# Patient Record
Sex: FEMALE | Race: WHITE | NOT HISPANIC OR LATINO | Employment: FULL TIME
[De-identification: names, ages, dates, MRNs, and addresses within clinical notes are randomized per-mention and may not be internally consistent; named-entity substitution may affect disease eponyms.]

---

## 2023-05-21 ENCOUNTER — HEALTH MAINTENANCE LETTER (OUTPATIENT)
Age: 43
End: 2023-05-21

## 2023-05-22 NOTE — TELEPHONE ENCOUNTER
REFERRAL INFORMATION:    Referring Provider:  N/A    Referring Clinic:  N/A    Reason for Visit/Diagnosis: Re-establish care(WM)       FUTURE VISIT INFORMATION:    Appointment Date: 06-02-23    Appointment Time: @ 9am     NOTES RECORD STATUS  DETAILS   OFFICE NOTE from Referring Provider In process [facility] 2017   OFFICE NOTE from Other Specialists Care Everywhere [facility]:  11/18/22 - ENDO OV with Bryan Moritz, PA   HOSPITAL DISCHARGE SUMMARY/ ED VISITS  N/A    OPERATIVE REPORT N/A 2017 Bypass   ENDOSCOPY (EGD)  N/A    PERTINENT LABS N/A    PATHOLOGY REPORTS (RELATED) N/A    IMAGING (CT, MRI, US, XR)  N/A      05-22-23 sent request to [facility](# [phone] @ 1:00pm  * 5/26/23 12:04 PM Faxed req to [facility] (Fax: [phone]) for records to be urgently faxed to the clinic. - Zina

## 2023-06-02 ENCOUNTER — DOCUMENTATION ONLY (OUTPATIENT)
Dept: ENDOCRINOLOGY | Facility: CLINIC | Age: 43
End: 2023-06-02

## 2023-06-02 ENCOUNTER — VIRTUAL VISIT (OUTPATIENT)
Dept: ENDOCRINOLOGY | Facility: CLINIC | Age: 43
End: 2023-06-02
Payer: COMMERCIAL

## 2023-06-02 ENCOUNTER — PRE VISIT (OUTPATIENT)
Dept: ENDOCRINOLOGY | Facility: CLINIC | Age: 43
End: 2023-06-02

## 2023-06-02 VITALS — WEIGHT: 140 LBS | BODY MASS INDEX: 20.73 KG/M2 | HEIGHT: 69 IN

## 2023-06-02 DIAGNOSIS — E44.1 MILD PROTEIN-CALORIE MALNUTRITION (H): ICD-10-CM

## 2023-06-02 DIAGNOSIS — R10.13 EPIGASTRIC PAIN: ICD-10-CM

## 2023-06-02 DIAGNOSIS — K90.9 INTESTINAL MALABSORPTION, UNSPECIFIED TYPE: ICD-10-CM

## 2023-06-02 DIAGNOSIS — Z87.11 HX OF GASTRIC ULCER: ICD-10-CM

## 2023-06-02 DIAGNOSIS — Z98.84 S/P BARIATRIC SURGERY: Primary | ICD-10-CM

## 2023-06-02 DIAGNOSIS — E56.9 VITAMIN DEFICIENCY: ICD-10-CM

## 2023-06-02 DIAGNOSIS — K21.00 GASTROESOPHAGEAL REFLUX DISEASE WITH ESOPHAGITIS, UNSPECIFIED WHETHER HEMORRHAGE: ICD-10-CM

## 2023-06-02 PROBLEM — K95.89 IRON DEFICIENCY ANEMIA FOLLOWING BARIATRIC SURGERY: Status: ACTIVE | Noted: 2023-05-24

## 2023-06-02 PROBLEM — D50.8 IRON DEFICIENCY ANEMIA FOLLOWING BARIATRIC SURGERY: Status: ACTIVE | Noted: 2023-05-24

## 2023-06-02 PROBLEM — G47.00 INSOMNIA: Status: ACTIVE | Noted: 2021-05-27

## 2023-06-02 PROBLEM — E21.3 HYPERPARATHYROIDISM, UNSPECIFIED (H): Status: ACTIVE | Noted: 2021-05-27

## 2023-06-02 PROBLEM — Z87.19 HX SBO: Status: ACTIVE | Noted: 2020-09-30

## 2023-06-02 PROCEDURE — 99205 OFFICE O/P NEW HI 60 MIN: CPT | Mod: VID | Performed by: NURSE PRACTITIONER

## 2023-06-02 RX ORDER — OMEPRAZOLE 40 MG/1
40 CAPSULE, DELAYED RELEASE ORAL DAILY
Qty: 90 CAPSULE | Refills: 3 | Status: SHIPPED | OUTPATIENT
Start: 2023-06-02 | End: 2023-11-21

## 2023-06-02 RX ORDER — SUCRALFATE ORAL 1 G/10ML
1 SUSPENSION ORAL 4 TIMES DAILY PRN
Qty: 414 ML | Refills: 3 | Status: SHIPPED | OUTPATIENT
Start: 2023-06-02 | End: 2023-11-21

## 2023-06-02 RX ORDER — CYANOCOBALAMIN 1000 UG/ML
1000 INJECTION, SOLUTION INTRAMUSCULAR; SUBCUTANEOUS
COMMUNITY
Start: 2022-03-14

## 2023-06-02 RX ORDER — FERROUS SULFATE 324(65)MG
TABLET, DELAYED RELEASE (ENTERIC COATED) ORAL
COMMUNITY
Start: 2022-02-10

## 2023-06-02 RX ORDER — B-COMPLEX WITH VITAMIN C
1 TABLET ORAL 3 TIMES DAILY
COMMUNITY
Start: 2022-08-01

## 2023-06-02 RX ORDER — AMOXICILLIN 250 MG
5000 CAPSULE ORAL EVERY MORNING
Status: ON HOLD | COMMUNITY
Start: 2022-03-22 | End: 2023-10-24

## 2023-06-02 ASSESSMENT — PAIN SCALES - GENERAL: PAINLEVEL: MODERATE PAIN (4)

## 2023-06-02 NOTE — PATIENT INSTRUCTIONS
"Thank you for allowing us the privilege of caring for you. We hope we provided you with the excellent service you deserve.   Please let us know if there is anything else we can do for you so that we can be sure you are completely satisfied with your care experience.    To ensure the quality of our services you may be receiving a patient satisfaction survey from an independent patient satisfaction monitoring company.    The greatest compliment you can give is a \"Likely to Recommend\"    Your visit was with Britta Mcgee NP today.    Instructions per today's visit:   -carafate at night on empty stomach- up to 4 times a day - needs to be  by 2 hours with any vitamins   -omeprazole daily on empty stomach- open and mix with teaspoon of applesauce  -EGD with Dr. Roth- [facility] should call  --if you dont hear from any one, Call [facility] at [phone] for scheduling.   -labs- we will fax to [facility]- let me know when you do them as I don't get notified when you go to other facilities -- we'll adjust vitamins after this.   -continue with iron infusion   -we will [facility] to get [facility]   -take creon 3 capfuls with first bite of food with each meal   -look for ways to increase protein- greek yogurt, cottage cheese, protein shakes, eggs, tofu are easier to tolerate proteins - you could try mixing protein into your coffee?   -increase hydration- add liquid IV or gatorade zero once daily to help with hydration   -see Sarah the dietitian next week  -follow up with me in 2-3 months  -You'll see Dr. Roth in clinic after we have op reports and EGD done       _________________________________________________________________________________________________________________________________________________________  Important contact and scheduling information:  Please call our contact center at [phone] to schedule your next appointments.  To find a lab location near you, please call [phone].  For " any nursing questions or concerns call Delma Ellis LPN at [phone] or Zina Montes RN at [phone]  Please call during clinic hours Monday through Friday 8:00a - 4:00p if you have questions or you can contact us via [employer] at anytime and we will reply during clinic hours.    Lab results will be communicated through My Chart or letter (if My Chart not used). Please call the clinic if you have not received communication after 1 week or if you have any questions.?  Clinic Fax: [phone]  _________________________________________________________________________________________________________________________________________________________  If you are asked by your clinic team to have your blood pressure checked:  [facility] do offer several locations for blood pressure checks. Please follow the below link to schedule an appointment. Scheduling an appointment at the pharmacy for a blood pressure check is now preferred.    Appointment Plus (appointment-plus.THE FASHION)  _________________________________________________________________________________________________________________________________________________________  Meal Replacement Products:    Here is the link to our new e-store where you can purchase our meal replacement products    [facility] E-Store  [employer]/store    The one week starter kit is a great way to sample a variety of products and see what works for you.    If you want more information about the product go to: Fresh [email or web address]    If you are an employee or [facility] or [facility] please contact your care team for a 10% estore discount    Free Shipping for orders over $75     Benefits of meal replacements products:    Portion and calorie control  Improved nutrition  Structured eating  Simplified food choices  Avoid contact with trigger foods  ______________  COMPREHENSIVE WEIGHT MANAGEMENT PROGRAM  VIRTUAL SUPPORT  "GROUPS    For Support Group Information:      We offer support groups for patients who are working on weight loss and considering, preparing for or have had weight loss surgery.   There is no cost for this opportunity.  You are invited to attend the?Virtual Support Groups?provided by any of the following locations:    [facility] via Microsoft Teams with Wendy Adkins RN  2.   [place] via [employer] with Uvaldo Solorio, PhD, LP  3.   [place] via [employer] with Lana Nieves RN  4.   [facility] via [employer] Teams with Lana Rogers [facility]    The following Support Group information can also be found on our website:  [email or web address]    [facility] Weight Loss Surgery Support Group    [facility] Weight Loss Surgery Support Group  The support group is a patient-lead forum that meets monthly to share experiences, encouragement and education. It is open to those who have had weight loss surgery, are scheduled for surgery, and those who are considering surgery.   WHEN: This group meets on the 3rd Wednesday of each month from 5:00PM - 6:00PM virtually using Microsoft Teams.   FACILITATOR: Led by Wendy Adkins, LAURA, JEWELL, RN, the program's Clinical Coordinator.   TO REGISTER: Please contact the clinic via [employer] or call the nurse line directly at [phone] to inform our staff that you would like an invite sent to you and to let us know the email you would like the invite sent to. Prior to the meeting, a link with directions on how to join the meeting will be sent to you.    2022 Meetings  Lyndsey 15: \"Let's Talk\" a time for the group to share.  July 20: \"Let's Talk\" a time for the group to share.  August 17: \"Let's Talk\" a time for the group to share.  September 21: \"Let's Talk\" a time for the group to share.  October 19: Guest Speaker: Dr Mauricio Santillan MD Pulmonologist and Sleep Medicine Physician, \"Getting a Good Night's " "Sleep\".  November 16: \"Let's Talk\" a time for the group to share.  December 21: \"Let's Talk\" a time for the group to share.    [facility] Support Groups    Connections: Bariatric Care Support Group?  This is open to all [facility] (and those external to this program) pre- and post- operative bariatric surgery patients as well as their support system.   WHEN: This group meets the 2nd Tuesday of each month from 6:30 PM - 8:00 PM virtually using Microsoft Teams.   FACILITATOR: Led by Uvaldo Solorio, Ph.D who is a Licensed Psychologist with the [facility] Comprehensive Weight Management Program.   TO REGISTER: Please send an email to Uvaldo Solorio, Ph.D.,  at?[email or web address]?if you would like an invitation to the group and to learn about using Microsoft Teams.    2022 Meetings  June 14: Barbara Johnson, LAURA, LD at [facility], \"Nutritional Labeling\"  July 12 August 2 (Please Note Date Change)  September 13 October 11 November 8 December 13    Connections: Post-Operative Bariatric Surgery Support Group  This is a support group for [facility] bariatric patients (and those external to [facility]) who have had bariatric surgery and are at least 3 months post-surgery.  WHEN: This support group meets the 4th Wednesday of the month from 11:00 AM - 12:00 PM virtually using Microsoft Teams.   FACILITATOR: Led by Certified Bariatric Nurse, Lana Nieves RN.   TO REGISTER: Please send an email to Lana at [email or web address] if you would like an invitation to the group and to learn about using Microsoft Teams.    2022 Meetings June 22 July 27 August 24 September 28 October 26 November 23 December 28  _____________________________________________________________________________________________________________________________________________  [place] [facility] Get Moving Program  Our team of physical therapists is " trained to help you understand and take control of your condition. They will perform a thorough evaluation to determine your ability for activity and develop a customized plan to fit your goals and physical ability.  Scheduling: Unsure if the Get Moving program is right for you? Discuss the program with your medical provider or diabetes educator. You can also call us at [phone] to ask questions or schedule an appointment.   [facility] Get Moving Program  ___________________________________________________________________________________________________________________________________________    To work with a Behavioral Health Psychologist:    Call to schedule:    Sal Cobian - [phone]  Maryjo Carpio - [phone]  Angeline Larson - [phone]  Saba Ray - [phone]   Linsey Muñiz PhD (cannot accept Medicare) [phone]        Thank you,   [facility] Comprehensive Weight Management Team

## 2023-06-02 NOTE — LETTER
"2023       RE: Juanis Sparks  [address]  [facility] [address]     Dear Colleague,    Thank you for referring your patient, Juanis Sparks, to the [facility]. Please see a copy of my visit note below.    Verbal consent to sign for HP access via [facility] Bariatric Surgery Consultation Note    2023    RE: Juanis Sparks  MR#: [id number]  : 1980      Referring provider:       2023     9:00 AM   --   Who referred you [facility]       Chief Complaint/Reason for visit: evaluation for possible weight loss surgery    Dear System, Provider Not In (General),    I had the pleasure of seeing your patient, Juanis Sparks, to evaluate her obesity and consider her for possible weight loss surgery.  As you know, Juanis Sparks is 42 year old.  She has a height of 5' 9\", a weight of 140 lbs 0 oz, and calculated Body mass index is 20.67 kg/m ..  Full intake/assessment was done to determine barriers to weight loss success and develop a treatment plan.    Assessment & Plan   Problem List Items Addressed This Visit        Nervous and Auditory    Epigastric pain    Relevant Medications    sucralfate (CARAFATE) 1 GM/10ML suspension    Other Relevant Orders    Adult GI  Referral - Procedure Only       Digestive    Intestinal malabsorption, unspecified type    Relevant Medications    lipase-protease-amylase (CREON) 21429-15657-496577 units CPEP per EC capsule    Other Relevant Orders    CBC with platelets    Comprehensive metabolic panel    Copper level    Ferritin    Parathyroid Hormone Intact    Vitamin A    Vitamin B12    Vitamin D Deficiency    Vitamin E    Vitamin K    Zinc    Vitamin deficiency    Relevant Medications    lipase-protease-amylase (CREON) [id number] units CPEP per EC capsule    Other Relevant Orders    CBC with platelets    Comprehensive metabolic panel    Copper " "level    Ferritin    Parathyroid Hormone Intact    Vitamin A    Vitamin B12    Vitamin D Deficiency    Vitamin E    Vitamin K    Zinc    Gastroesophageal reflux disease with esophagitis, unspecified whether hemorrhage    Relevant Medications    omeprazole (PRILOSEC) 40 MG DR capsule    lipase-protease-amylase (CREON) 24722-89187-138597 units CPEP per EC capsule    sucralfate (CARAFATE) 1 GM/10ML suspension    Other Relevant Orders    Adult GI  Referral - Procedure Only    Mild protein-calorie malnutrition (H)    Relevant Medications    lipase-protease-amylase (CREON) [id number] units CPEP per EC capsule       Other    S/P bariatric surgery - Primary     Patient underwent sleeve gastrectomy in 2017 at [facility] in [place]. 1 month postop she ended up having emergency surgery which required removal of 12-14 inches of intestine. She was told she had a RNYGB at that time. Operative reports not available today. She was working 3 jobs at the time and doesn't remember how her weight loss was then. Feels she has always had issues with eating and loose stools. She has had subsequent abdominal hernia repar with mesh and had to have mesh repaired sometime after that.     Historically, she has had \"several\" gastric and intestinal ulcers which were known at the time of her bariatric surgery. She was told she had a lot of inflammation and needed cholecystectomy as well. Since surgery she has been having a number of issues which bring her to clinic today, all of which she states she has been seen for at [facility] previously.     Describes epigastric pain, vomiting, reflux, regurgitation and wakes up choking often. Dysphagia with some meat and pills. She takes tums only for this as she has felt  Nothing else has been beneficial in the past. Pain and reflux prevent her from wanting to eat, other days she has anorexia. When she can eat, she describes 8-10 greasy loose bowel movements daily. She " has been working to manage her vitamin deficiency, malnutrition and malabsorption for several years and has become frustrated. She has an iron infusion ordered but hasn't scheduled yet. She does note hematemesis, coffee ground emesis, ( both intermittent), hematochezia and dark stools (both intermittent). Last EGD was several years ago at [facility]. Not taking PPI.     No hx tobacco use. No alcohol use. No NSAIDS. No carbonation. +caffeine 1-2 cups coffee daily. She does not drink much other than coffee during the day.     Discussed with patient her malabsorption and malnutrition and plans for management. She has questions about banana bag and IV fluids for dehydration. We discussed oral nutrition as our primary plan. If we can not optimize oral nutrition adequate, we'd need to consider Gtube management before TPN. We discussed IV hydration as a form of rescue hydration rather than maintenance hydration. Patient describes forgetting to eat and drink because she is busy even when she is feeling well. Stressed importance of increasing her nutrition as much of her dizziness/ malaise are secondary to her having inadequate oral intake. Stressed protein and hydration as first things to work on. Will see RD. Full bariatric panel has not been recently done, will update. Question if patient has absorption more like DS than RNYGB and needs water miscible vitamins.     In order to optimize nutrition we need to address symptom management. PPI will be essential life long given history of gastric ulcers. Discussed use of carafate for pain management especially with bothersome over night pain. Will trial creon to improve absorption and potentially improve loose stools. I will have Dr. Roth do EGD to evaluate for active ulcers, PPI would be first line treatment for this.     Plan to get operative reports for Dr. Roth to review at follow up after EGD. Discussed if intestine is removed there is not likely a surgical repair for her  malabsorption and we'll need to focus on nutrition/ hydration as the treatment.    Plan:   -carafate at night on empty stomach- up to 4 times a day - needs to be  by 2 hours with any vitamins   -omeprazole daily on empty stomach- open and mix with teaspoon of applesauce  -EGD with Dr. Roth- [facility] should call  --if you dont hear from any one, Call [facility] at [phone] for scheduling.   -labs- we will fax to [facility]- let me know when you do them as I don't get notified when you go to other facilities -- we'll adjust vitamins after this.   -continue with iron infusion   -we will try to get operative reports from [facility]   -take creon 3 capfuls with first bite of food with each meal   -look for ways to increase protein- greek yogurt, cottage cheese, protein shakes, eggs, tofu are easier to tolerate proteins - you could try mixing protein into your coffee?   -increase hydration- add liquid IV or gatorade zero once daily to help with hydration   -see Sarah the dietitian next week  -follow up with me in 2-3 months  -You'll see Dr. Roth in clinic after we have op reports and EGD done          Relevant Medications    omeprazole (PRILOSEC) 40 MG DR capsule    lipase-protease-amylase (CREON) [id number] units CPEP per EC capsule    sucralfate (CARAFATE) 1 GM/10ML suspension    Other Relevant Orders    CBC with platelets    Comprehensive metabolic panel    Copper level    Ferritin    Parathyroid Hormone Intact    Vitamin A    Vitamin B12    Vitamin D Deficiency    Vitamin E    Vitamin K    Zinc    Adult GI  Referral - Procedure Only    Hx of gastric ulcer    Relevant Medications    omeprazole (PRILOSEC) 40 MG DR capsule    lipase-protease-amylase (CREON) [id number] units CPEP per EC capsule    sucralfate (CARAFATE) 1 GM/10ML suspension    Other Relevant Orders    Adult GI  Referral - Procedure Only          HISTORY OF PRESENT ILLNESS:      6/2/2023     9:00 AM   Weight Loss  "History Reviewed with Patient   What is the most that you have ever weighed 299   What is the most weight you have lost? 159   I have tried the following methods to lose weight Weight Loss Surgery   I have tried the following weight loss medications? (Check all that apply) None   Please select the type of weight loss surgery you had (select all that apply) gastric bypass / [facility]-en-Y    duodenal switch     Pre-surgery 299lb - gallbladder removed at the same time as sleeve  -was told everything was \"inflamed\" and the weight loss would improve this     RNYGB [facility] 2017  Sleeve to RNYGB emergently 1 month after sleeve - 12-14 inches of intestine removed   -was working 3 jobs at the time of surgery, she isn't sure if there was a time   5 months postop- had \"hernia surgery\" - sounds like abdominal wall hernia - repaired with mesh  Mesh replaced later      11/2022- seen by endo for hyperparathyroidism- working on calcium and vitamin D  12/2022 dizziness and fatigue     Fluctuates between 135 and 155lb   NO EGD in 4-5 years - [facility]     Can't keep food in - sometimes vomiting, frequent diarrhea 45 min after a meal. Will have loose stools 8-10 times a day - greasy/ fatty stools. Has had incontinence from this. This has gotten worse over time.     Can't eat sometimes- pain, feels food gets stuck      Difficulty with reflux- all the time, carries tums everywhere, wakes up choking on regurgitation     Epigastric pain - like a knife twisting - no specific triggers, none in the last week but occurs a few times a month lasting up to an hour (stomach feels hot on the outside of skin). Pain improves sometimes with firm pressure   +coffee ground stools and +hematchezia both intermittently     Significant dizziness/ lightheadedness/ malaise frequently throughout the years     Difficulty with vitamin Deficiency     Vitamins:  Calcium citrate 3 daily   Iron 3 tab daily + vitamin C   Vitamin b12 injection every two weeks " plus SL daily   50,000 + 5,000 daily   Vitamin A daily   Multivitamin     No tobacco use. No alcohol use. No NSAIDS. +caffeien (2 cups coffee daily), no carbonation         CO-MORBIDITIES OF OBESITY INCLUDE:      6/2/2023     9:00 AM   --   I have the following health issues associated with obesity GERD (Reflux)      Sleep- wakes every 1-2 hours with stomach pain or up frequently to go to the bathroom. Dozing off mid afternoon but tries to avoid napping.     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 Never   Do you currently use any of the following Nicotine products? No   Have you ever used any of the following nicotine products? No   Have you or are you currently using street drugs or prescription strength medication for which you do not have a prescription for? No   Do you have a history of chemical dependency (alcohol or drug abuse)? No     Currently taking narcotic/opioids No    PSYCHOLOGICAL HISTORY:       6/2/2023     9:00 AM   Psychological History Reviewed With Patient   Have you ever attempted suicide? Never.   Have you had thoughts of suicide in the past year? No   Have you ever been hospitalized for mental illness or a suicide attempt? Never.   Do you have a history of chronic pain? No   Have you ever been diagnosed with fibromyalgia? No   Are you currently being treated for any of the following? (select all that apply) I do not have a mental illness       ROS:      6/2/2023     9:00 AM   --   Skin None of the above   HEENT Headaches    Dizziness/lightheadedness    Teeth, dentures, or bridges needing repairs   Musculoskeletal Joint Pain     Back pain    Swelling of legs   Cardiovascular None of the above   Pulmonary Experience morning headaches   Gastrointestinal Heartburn    Reflux    Diarrhea    Constipation    Ulcers    Difficulty swallowing (food gets stuck)   Genitourinary None of the above   Hematological None of the above   Neurological Migraine headaches   Female only None of the above       EATING BEHAVIORS:      6/2/2023     9:00 AM   --   Have you or anyone else thought that you had an eating disorder? No   Do you currently binge eat (eat a large amount of food in a short time)? No   Are you an emotional eater? No   Do you get up to eat after falling asleep? No   Can you afford 3 meals a day? Yes   Can you afford 50-60 dollars a month for vitamins? No    Breakfast - banana  Snack- fruit or pretzles  Dinner- meat/ potato/ veggie - not always able to eat the meat, chicken is easiest if not dry   Can eat 1cup to 1.5cup at a setting     Hydration- doesn't drink water - just the 2 cups of coffee daily     EXERCISE:      6/2/2023     9:00 AM   --   How often do you exercise? Daily   What is the duration of your exercise (in minutes)? 30 Minutes   What types of exercise do you do? walking   What keeps you from being more active? Lack of Time    Too tired      Walking some at work     MEDICATIONS:  Current Outpatient Medications   Medication Sig Dispense Refill     Calcium Carbonate-Vitamin D 600-5 MG-MCG TABS Take 1 tablet by mouth       Cobalamin Combinations (B-12) 100-5000 MCG SUBL Place 5,000 mcg under the tongue       cyanocobalamin (CYANOCOBALAMIN) 1000 MCG/ML injection Inject 1,000 mcg into the muscle       Ferrous Sulfate 324 MG TBEC Take 1 tablet by mouth daily       lipase-protease-amylase (CREON) [id number] units CPEP per EC capsule Take 3 capsules by mouth 3 times daily (with meals) 270 capsule 3     omeprazole (PRILOSEC) 40 MG DR capsule Take 1 capsule (40 mg) by mouth daily 90 capsule 3     sucralfate (CARAFATE) 1 GM/10ML  suspension Take 10 mLs (1 g) by mouth 4 times daily as needed (epigastric pain, regurgitation) 414 mL 3     Vitamin A 3 MG (27617 UT) TABS Vitamin A. Taking 1000 units PO daily.       vitamin D3 (CHOLECALCIFEROL) 1.25 MG (03592 UT) capsule 1 cap by mouth 4 days a week (M/W/F/Saturday)       vitamin D3 (CHOLECALCIFEROL) 125 MCG (5000 UT) tablet Take 5,000 Units by mouth         Is patient on biologics or immunomodulators? NO     ALLERGIES:  Allergies   Allergen Reactions     Amoxicillin Hives and Itching     Tetracycline Hives           PHYSICAL EXAM:  Objective    Physical Exam   GENERAL: Healthy, alert and no distress  EYES: Eyes grossly normal to inspection.  No discharge or erythema, or obvious scleral/conjunctival abnormalities.  RESP: No audible wheeze, cough, or visible cyanosis.  No visible retractions or increased work of breathing.    SKIN: Visible skin clear. No significant rash, abnormal pigmentation or lesions.  NEURO: Cranial nerves grossly intact.  Mentation and speech appropriate for age.  PSYCH: Mentation appears normal, affect normal/bright, judgement and insight intact, normal speech and appearance well-groomed.      Sincerely,     Britta Mcgee NP      95 minutes spent by me on the date of the encounter doing chart review, history and exam, documentation and further activities per the note    Virtual Visit Details    Type of service:  Video Visit     Originating Location (pt. Location): Home    Distant Location (provider location):  Off-site  Platform used for Video Visit: AmWell        Again, thank you for allowing me to participate in the care of your patient.      Sincerely,    Britta Mcgee NP

## 2023-06-02 NOTE — PROGRESS NOTES
"Verbal consent to sign for HP access via [facility] Bariatric Surgery Consultation Note    2023    RE: Juanis Sparks  MR#: [id number]  : 1980      Referring provider:       2023     9:00 AM   --   Who referred you [facility]       Chief Complaint/Reason for visit: evaluation for possible weight loss surgery    Dear System, Provider Not In (General),    I had the pleasure of seeing your patient, Juanis Sparks, to evaluate her obesity and consider her for possible weight loss surgery.  As you know, Juanis Sparks is 42 year old.  She has a height of 5' 9\", a weight of 140 lbs 0 oz, and calculated Body mass index is 20.67 kg/m ..  Full intake/assessment was done to determine barriers to weight loss success and develop a treatment plan.    Assessment & Plan   Problem List Items Addressed This Visit        Nervous and Auditory    Epigastric pain    Relevant Medications    sucralfate (CARAFATE) 1 GM/10ML suspension    Other Relevant Orders    Adult GI  Referral - Procedure Only       Digestive    Intestinal malabsorption, unspecified type    Relevant Medications    lipase-protease-amylase (CREON) [id number] units CPEP per EC capsule    Other Relevant Orders    CBC with platelets    Comprehensive metabolic panel    Copper level    Ferritin    Parathyroid Hormone Intact    Vitamin A    Vitamin B12    Vitamin D Deficiency    Vitamin E    Vitamin K    Zinc    Vitamin deficiency    Relevant Medications    lipase-protease-amylase (CREON) [id number] units CPEP per EC capsule    Other Relevant Orders    CBC with platelets    Comprehensive metabolic panel    Copper level    Ferritin    Parathyroid Hormone Intact    Vitamin A    Vitamin B12    Vitamin D Deficiency    Vitamin E    Vitamin K    Zinc    Gastroesophageal reflux disease with esophagitis, unspecified whether hemorrhage    Relevant Medications    omeprazole (PRILOSEC) 40 MG DR capsule    lipase-protease-amylase " "(CREON) [id number] units CPEP per EC capsule    sucralfate (CARAFATE) 1 GM/10ML suspension    Other Relevant Orders    Adult GI  Referral - Procedure Only    Mild protein-calorie malnutrition (H)    Relevant Medications    lipase-protease-amylase (CREON) [id number] units CPEP per EC capsule       Other    S/P bariatric surgery - Primary     Patient underwent sleeve gastrectomy in 2017 at [facility] in [place]. 1 month postop she ended up having emergency surgery which required removal of 12-14 inches of intestine. She was told she had a RNYGB at that time. Operative reports not available today. She was working 3 jobs at the time and doesn't remember how her weight loss was then. Feels she has always had issues with eating and loose stools. She has had subsequent abdominal hernia repar with mesh and had to have mesh repaired sometime after that.     Historically, she has had \"several\" gastric and intestinal ulcers which were known at the time of her bariatric surgery. She was told she had a lot of inflammation and needed cholecystectomy as well. Since surgery she has been having a number of issues which bring her to clinic today, all of which she states she has been seen for at [facility] and [facility] previously.     Describes epigastric pain, vomiting, reflux, regurgitation and wakes up choking often. Dysphagia with some meat and pills. She takes tums only for this as she has felt  Nothing else has been beneficial in the past. Pain and reflux prevent her from wanting to eat, other days she has anorexia. When she can eat, she describes 8-10 greasy loose bowel movements daily. She has been working to manage her vitamin deficiency, malnutrition and malabsorption for several years and has become frustrated. She has an iron infusion ordered but hasn't scheduled yet. She does note hematemesis, coffee ground emesis, ( both intermittent), hematochezia and dark stools (both intermittent). Last " EGD was several years ago at [facility]. Not taking PPI.     No hx tobacco use. No alcohol use. No NSAIDS. No carbonation. +caffeine 1-2 cups coffee daily. She does not drink much other than coffee during the day.     Discussed with patient her malabsorption and malnutrition and plans for management. She has questions about banana bag and IV fluids for dehydration. We discussed oral nutrition as our primary plan. If we can not optimize oral nutrition adequate, we'd need to consider Gtube management before TPN. We discussed IV hydration as a form of rescue hydration rather than maintenance hydration. Patient describes forgetting to eat and drink because she is busy even when she is feeling well. Stressed importance of increasing her nutrition as much of her dizziness/ malaise are secondary to her having inadequate oral intake. Stressed protein and hydration as first things to work on. Will see RD. Full bariatric panel has not been recently done, will update. Question if patient has absorption more like DS than RNYGB and needs water miscible vitamins.     In order to optimize nutrition we need to address symptom management. PPI will be essential life long given history of gastric ulcers. Discussed use of carafate for pain management especially with bothersome over night pain. Will trial creon to improve absorption and potentially improve loose stools. I will have Dr. Roth do EGD to evaluate for active ulcers, PPI would be first line treatment for this.     Plan to get operative reports for Dr. Roth to review at follow up after EGD. Discussed if intestine is removed there is not likely a surgical repair for her malabsorption and we'll need to focus on nutrition/ hydration as the treatment.    Plan:   -carafate at night on empty stomach- up to 4 times a day - needs to be  by 2 hours with any vitamins   -omeprazole daily on empty stomach- open and mix with teaspoon of applesauce  -EGD with Dr. Araceli Young  should call  --if you dont hear from any one, Call Hector at [phone] for scheduling.   -labs- we will fax to [facility]- let me know when you do them as I don't get notified when you go to other facilities -- we'll adjust vitamins after this.   -continue with iron infusion   -we will try to get operative reports from [facility]   -take creon 3 capfuls with first bite of food with each meal   -look for ways to increase protein- greek yogurt, cottage cheese, protein shakes, eggs, tofu are easier to tolerate proteins - you could try mixing protein into your coffee?   -increase hydration- add liquid IV or gatorade zero once daily to help with hydration   -see Sarah the dietitian next week  -follow up with me in 2-3 months  -You'll see Dr. Roth in clinic after we have op reports and EGD done          Relevant Medications    omeprazole (PRILOSEC) 40 MG DR capsule    lipase-protease-amylase (CREON) [id number] units CPEP per EC capsule    sucralfate (CARAFATE) 1 GM/10ML suspension    Other Relevant Orders    CBC with platelets    Comprehensive metabolic panel    Copper level    Ferritin    Parathyroid Hormone Intact    Vitamin A    Vitamin B12    Vitamin D Deficiency    Vitamin E    Vitamin K    Zinc    Adult GI  Referral - Procedure Only    Hx of gastric ulcer    Relevant Medications    omeprazole (PRILOSEC) 40 MG DR capsule    lipase-protease-amylase (CREON) [id number] units CPEP per EC capsule    sucralfate (CARAFATE) 1 GM/10ML suspension    Other Relevant Orders    Adult GI  Referral - Procedure Only          HISTORY OF PRESENT ILLNESS:      6/2/2023     9:00 AM   Weight Loss History Reviewed with Patient   What is the most that you have ever weighed 299   What is the most weight you have lost? 159   I have tried the following methods to lose weight Weight Loss Surgery   I have tried the following weight loss medications? (Check all that apply) None   Please select the type of  "weight loss surgery you had (select all that apply) gastric bypass / Mary Ann-en-Y    duodenal switch     Pre-surgery 299lb - gallbladder removed at the same time as sleeve  -was told everything was \"inflamed\" and the weight loss would improve this     RNYGB Ethel Martini 2017  Sleeve to RNYGB emergently 1 month after sleeve - 12-14 inches of intestine removed   -was working 3 jobs at the time of surgery, she isn't sure if there was a time   5 months postop- had \"hernia surgery\" - sounds like abdominal wall hernia - repaired with mesh  Mesh replaced later      11/2022- seen by endo for hyperparathyroidism- working on calcium and vitamin D  12/2022 dizziness and fatigue     Fluctuates between 135 and 155lb   NO EGD in 4-5 years - [facility]     Can't keep food in - sometimes vomiting, frequent diarrhea 45 min after a meal. Will have loose stools 8-10 times a day - greasy/ fatty stools. Has had incontinence from this. This has gotten worse over time.     Can't eat sometimes- pain, feels food gets stuck      Difficulty with reflux- all the time, carries tums everywhere, wakes up choking on regurgitation     Epigastric pain - like a knife twisting - no specific triggers, none in the last week but occurs a few times a month lasting up to an hour (stomach feels hot on the outside of skin). Pain improves sometimes with firm pressure   +coffee ground stools and +hematchezia both intermittently     Significant dizziness/ lightheadedness/ malaise frequently throughout the years     Difficulty with vitamin Deficiency     Vitamins:  Calcium citrate 3 daily   Iron 3 tab daily + vitamin C   Vitamin b12 injection every two weeks plus SL daily   50,000 + 5,000 daily   Vitamin A daily   Multivitamin     No tobacco use. No alcohol use. No NSAIDS. +caffeien (2 cups coffee daily), no carbonation         CO-MORBIDITIES OF OBESITY INCLUDE:      6/2/2023     9:00 AM   --   I have the following health issues associated with obesity GERD " (Reflux)      Sleep- wakes every 1-2 hours with stomach pain or up frequently to go to the bathroom. Dozing off mid afternoon but tries to avoid napping.     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 Never   Do you currently use any of the following Nicotine products? No   Have you ever used any of the following nicotine products? No   Have you or are you currently using street drugs or prescription strength medication for which you do not have a prescription for? No   Do you have a history of chemical dependency (alcohol or drug abuse)? No     Currently taking narcotic/opioids No    PSYCHOLOGICAL HISTORY:       6/2/2023     9:00 AM   Psychological History Reviewed With Patient   Have you ever attempted suicide? Never.   Have you had thoughts of suicide in the past year? No   Have you ever been hospitalized for mental illness or a suicide attempt? Never.   Do you have a history of chronic pain? No   Have you ever been diagnosed with fibromyalgia? No   Are you currently being treated for any of the following? (select all that apply) I do not have a mental illness       ROS:      6/2/2023     9:00 AM   --   Skin None of the above   HEENT Headaches    Dizziness/lightheadedness    Teeth, dentures, or bridges needing repairs   Musculoskeletal Joint Pain    Back pain    Swelling of legs   Cardiovascular None of the above   Pulmonary Experience morning headaches   Gastrointestinal Heartburn    Reflux    Diarrhea    Constipation    Ulcers    Difficulty swallowing (food gets stuck)   Genitourinary None of the above   Hematological None of the above    Neurological Migraine headaches   Female only None of the above       EATING BEHAVIORS:      6/2/2023     9:00 AM   --   Have you or anyone else thought that you had an eating disorder? No   Do you currently binge eat (eat a large amount of food in a short time)? No   Are you an emotional eater? No   Do you get up to eat after falling asleep? No   Can you afford 3 meals a day? Yes   Can you afford 50-60 dollars a month for vitamins? No    Breakfast - banana  Snack- fruit or pretzles  Dinner- meat/ potato/ veggie - not always able to eat the meat, chicken is easiest if not dry   Can eat 1cup to 1.5cup at a setting     Hydration- doesn't drink water - just the 2 cups of coffee daily     EXERCISE:      6/2/2023     9:00 AM   --   How often do you exercise? Daily   What is the duration of your exercise (in minutes)? 30 Minutes   What types of exercise do you do? walking   What keeps you from being more active? Lack of Time    Too tired      Walking some at work     MEDICATIONS:  Current Outpatient Medications   Medication Sig Dispense Refill     Calcium Carbonate-Vitamin D 600-5 MG-MCG TABS Take 1 tablet by mouth       Cobalamin Combinations (B-12) 100-5000 MCG SUBL Place 5,000 mcg under the tongue       cyanocobalamin (CYANOCOBALAMIN) 1000 MCG/ML injection Inject 1,000 mcg into the muscle       Ferrous Sulfate 324 MG TBEC Take 1 tablet by mouth daily       lipase-protease-amylase (CREON) [id number] units CPEP per EC capsule Take 3 capsules by mouth 3 times daily (with meals) 270 capsule 3     omeprazole (PRILOSEC) 40 MG DR capsule Take 1 capsule (40 mg) by mouth daily 90 capsule 3     sucralfate (CARAFATE) 1 GM/10ML suspension Take 10 mLs (1 g) by mouth 4 times daily as needed (epigastric pain, regurgitation) 414 mL 3     Vitamin A 3 MG (47711 UT) TABS Vitamin A. Taking 1000 units PO daily.       vitamin D3 (CHOLECALCIFEROL) 1.25 MG (65305 UT) capsule 1 cap by mouth 4 days a week (M/W/F/Saturday)        vitamin D3 (CHOLECALCIFEROL) 125 MCG (5000 UT) tablet Take 5,000 Units by mouth         Is patient on biologics or immunomodulators? NO     ALLERGIES:  Allergies   Allergen Reactions     Amoxicillin Hives and Itching     Tetracycline Hives           PHYSICAL EXAM:  Objective    Physical Exam   GENERAL: Healthy, alert and no distress  EYES: Eyes grossly normal to inspection.  No discharge or erythema, or obvious scleral/conjunctival abnormalities.  RESP: No audible wheeze, cough, or visible cyanosis.  No visible retractions or increased work of breathing.    SKIN: Visible skin clear. No significant rash, abnormal pigmentation or lesions.  NEURO: Cranial nerves grossly intact.  Mentation and speech appropriate for age.  PSYCH: Mentation appears normal, affect normal/bright, judgement and insight intact, normal speech and appearance well-groomed.      Sincerely,     Britta Mcgee NP      95 minutes spent by me on the date of the encounter doing chart review, history and exam, documentation and further activities per the note

## 2023-06-02 NOTE — ASSESSMENT & PLAN NOTE
"Patient underwent sleeve gastrectomy in 2017 at [facility] in [place]. 1 month postop she ended up having emergency surgery which required removal of 12-14 inches of intestine. She was told she had a RNYGB at that time. Operative reports not available today. She was working 3 jobs at the time and doesn't remember how her weight loss was then. Feels she has always had issues with eating and loose stools. She has had subsequent abdominal hernia repar with mesh and had to have mesh repaired sometime after that.     Historically, she has had \"several\" gastric and intestinal ulcers which were known at the time of her bariatric surgery. She was told she had a lot of inflammation and needed cholecystectomy as well. Since surgery she has been having a number of issues which bring her to clinic today, all of which she states she has been seen for at [facility] and [facility] previously.     Describes epigastric pain, vomiting, reflux, regurgitation and wakes up choking often. Dysphagia with some meat and pills. She takes tums only for this as she has felt  Nothing else has been beneficial in the past. Pain and reflux prevent her from wanting to eat, other days she has anorexia. When she can eat, she describes 8-10 greasy loose bowel movements daily. She has been working to manage her vitamin deficiency, malnutrition and malabsorption for several years and has become frustrated. She has an iron infusion ordered but hasn't scheduled yet. She does note hematemesis, coffee ground emesis, ( both intermittent), hematochezia and dark stools (both intermittent). Last EGD was several years ago at [facility]. Not taking PPI.     No hx tobacco use. No alcohol use. No NSAIDS. No carbonation. +caffeine 1-2 cups coffee daily. She does not drink much other than coffee during the day.     Discussed with patient her malabsorption and malnutrition and plans for management. She has questions about banana bag and IV fluids for dehydration. We " discussed oral nutrition as our primary plan. If we can not optimize oral nutrition adequate, we'd need to consider Gtube management before TPN. We discussed IV hydration as a form of rescue hydration rather than maintenance hydration. Patient describes forgetting to eat and drink because she is busy even when she is feeling well. Stressed importance of increasing her nutrition as much of her dizziness/ malaise are secondary to her having inadequate oral intake. Stressed protein and hydration as first things to work on. Will see RD. Full bariatric panel has not been recently done, will update. Question if patient has absorption more like DS than RNYGB and needs water miscible vitamins.     In order to optimize nutrition we need to address symptom management. PPI will be essential life long given history of gastric ulcers. Discussed use of carafate for pain management especially with bothersome over night pain. Will trial creon to improve absorption and potentially improve loose stools. I will have Dr. Roth do EGD to evaluate for active ulcers, PPI would be first line treatment for this.     Plan to get operative reports for Dr. Roth to review at follow up after EGD. Discussed if intestine is removed there is not likely a surgical repair for her malabsorption and we'll need to focus on nutrition/ hydration as the treatment.    Plan:   -carafate at night on empty stomach- up to 4 times a day - needs to be  by 2 hours with any vitamins   -omeprazole daily on empty stomach- open and mix with teaspoon of applesauce  -EGD with Dr. Roth- Hector should call  --if you dont hear from any one, Call Hector at [phone] for scheduling.   -labs- we will fax to [facility]- let me know when you do them as I don't get notified when you go to other facilities -- we'll adjust vitamins after this.   -continue with iron infusion   -we will try to get operative reports from [facility]   -take creon 3 capfuls with first  bite of food with each meal   -look for ways to increase protein- greek yogurt, cottage cheese, protein shakes, eggs, tofu are easier to tolerate proteins - you could try mixing protein into your coffee?   -increase hydration- add liquid IV or gatorade zero once daily to help with hydration   -see Sarah the dietitian next week  -follow up with me in 2-3 months  -You'll see Dr. Roth in clinic after we have op reports and EGD done

## 2023-06-02 NOTE — PROGRESS NOTES
Virtual Visit Details    Type of service:  Video Visit     Originating Location (pt. Location): Home    Distant Location (provider location):  Off-site  Platform used for Video Visit: Oni

## 2023-06-02 NOTE — Clinical Note
Hey! It looks like records from [facility] have been requested. I'm looking to get all op reports from [facility] before her EGD with Dr. Roth (not scheduled yet). I'll have her see Dr. Roth in Clinic after EGD. Thanks!

## 2023-06-02 NOTE — NURSING NOTE
Is the patient currently in the state of [place]? YES    Visit mode:VIDEO    If the visit is dropped, the patient can be reconnected by: VIDEO VISIT: Text to cell phone: [phone]    Will anyone else be joining the visit? NO      How would you like to obtain your AVS? MyChart    Are changes needed to the allergy or medication list? no    Reason for visit: Consult

## 2023-06-02 NOTE — Clinical Note
"Sounds like she hasn't gotten a lot of help in recent years despite seeking help. Potentially no one was honest with her - that she needs to eat? Or they didn't ask the right questions?   Sleeve in 2017 followed by emergent removal of 12-14in of intestines- was told she has a RNYGB and DS--- I suspect her absorption is more like DS- frequent greasy BM. Is working on getting iron infusion. I'll get updated labs- remind her to let me know when [facility] gets them  done.   She doesn't eat or drink well- like \"forgets\" or \"doesn't want to\" - I think some of it is aversion s/t pain but not sure that is it entirely.   I told her if she doesn't figure out eating- she'll need a G/J tube first and then TPN. If you stop using the gut, it will stop working. Not likely a surgical repair for what she has going on. A lot of her symptoms are because she isn't eating/ drinking.   She said no one has told her much of this and no one has given her a plan to fix it. So, sounded like she was open to my plan."

## 2023-06-06 ENCOUNTER — TELEPHONE (OUTPATIENT)
Dept: GASTROENTEROLOGY | Facility: CLINIC | Age: 43
End: 2023-06-06

## 2023-06-06 ENCOUNTER — TELEPHONE (OUTPATIENT)
Dept: ENDOCRINOLOGY | Facility: CLINIC | Age: 43
End: 2023-06-06

## 2023-06-06 NOTE — TELEPHONE ENCOUNTER
Patient declined Dietitian appointment today. She would like to have an EGD first and then check in pending results. She feels eating is not her issue.    Sent summary from Britta's appointment via email and [place] per pt request. Prefers email - has issues getting into [facility]. Aware this is not a secure method of communication and to not reply to the email.    RUTH ANN Jarrett, RD, LD  Clinic #: [phone]

## 2023-06-06 NOTE — TELEPHONE ENCOUNTER
"Endoscopy Scheduling Screen    Have you had a positive Covid test in the last 14 days?  No    Are you active on MyChart?   Yes    What insurance is in the chart?  Other:  [employer]    Ordering/Referring Provider: JONEL MORRISSEY   (If ordering provider performs procedure, schedule with ordering provider unless otherwise instructed. )    BMI: Estimated body mass index is 20.67 kg/m  as calculated from the following:    Height as of an earlier encounter on 6/6/23: 1.753 m (5' 9\").    Weight as of an earlier encounter on 6/6/23: 63.5 kg (140 lb).     Sedation Ordered  moderate sedation.   If patient BMI > 50 do not schedule in ASC.    Are you taking any prescription medications for pain?   No    Are you taking methadone or Suboxone?  No    Do you have a history of malignant hyperthermia or adverse reaction to anesthesia?  No    (Females) Are you currently pregnant?   No     Have you been diagnosed or told you have pulmonary hypertension?   No    Do you have an LVAD?  No    Have you been told you have moderate to severe sleep apnea?  No    Have you been told you have COPD, asthma, or any other lung disease?  No    Do you have any heart conditions?  No     Have you ever had or are you awaiting a heart or lung transplant?   No    Have you had a stroke or transient ischemic attack (TIA aka \"mini stroke\" in the last 6 months?   No    Have you been diagnosed with or been told you have cirrhosis of the liver?   No    Are you currently on dialysis?   No    Do you need assistance transferring?   No    BMI: Estimated body mass index is 20.67 kg/m  as calculated from the following:    Height as of an earlier encounter on 6/6/23: 1.753 m (5' 9\").    Weight as of an earlier encounter on 6/6/23: 63.5 kg (140 lb).     Is patients BMI > 40 and scheduling location [facility]?  No    Do you take the medication Phentermine, Ozempic or Wegovy?  No    Do you take the medication Naltrexone?  No    Do you take blood thinners?  No    Preferred " Pharmacy:    [facility] [place]  [place], [place] - [address]  [address]   [place] [address]  Phone: [phone] Fax: [phone]      Final Scheduling Details   Colonoscopy prep sent?  N/A    Procedure scheduled  EGD    Surgeon:  Ira     Date of procedure:  6/13/2023     Schedule PAC:   No    Location  [facility]    Sedation   Moderate Sedation    Patient Reminders:    You will receive a call from a Nurse to review instructions and health history.  This assessment must be completed prior to your procedure.  Failure to complete the Nurse assessment may result in the procedure being cancelled.       On the day of your procedure, please designate an adult(s) who can drive you home stay with you for the next 24 hours. The medicines used in the exam will make you sleepy. You will not be able to drive.       You cannot take public transportation, ride share services, or non-medical taxi service without a responsible caregiver.  Medical transport services are allowed with the requirement that a responsible caregiver will receive you at your destination.  We require that drivers and caregivers are confirmed prior to your procedure.

## 2023-06-07 ENCOUNTER — TELEPHONE (OUTPATIENT)
Dept: GASTROENTEROLOGY | Facility: CLINIC | Age: 43
End: 2023-06-07

## 2023-06-07 NOTE — TELEPHONE ENCOUNTER
Pre assessment questions completed for upcoming Upper endoscopy (EGD) procedure scheduled on 6/14/23    COVID policy reviewed.     Pre-op exam? N/A    Reviewed procedural arrival time 1300, procedure time 1400 and facility location [facility]; [address], [address], [place] [address]    Designated  policy reviewed. Instructed to have someone stay 6 hours post procedure.     NSAIDs? No    Anticoagulation/blood thinners? No    Electronic implanted devices? No    Diabetic? No.    Procedure indication: S/p bariatric surger- initially sleeve but now DS vs RNY? with sx of gastric ulcer suspicious of bleed    Reviewed procedure prep instructions.     Prep instructions sent via [facility].     Patient verbalized understanding and had no questions or concerns at this time.    Hannah Ye RN  Endoscopy Procedure Pre Assessment RN

## 2023-06-11 DIAGNOSIS — Z98.84 S/P BARIATRIC SURGERY: Primary | ICD-10-CM

## 2023-06-11 DIAGNOSIS — E56.9 VITAMIN DEFICIENCY: ICD-10-CM

## 2023-06-11 DIAGNOSIS — K90.9 INTESTINAL MALABSORPTION, UNSPECIFIED TYPE: ICD-10-CM

## 2023-06-11 DIAGNOSIS — E44.1 MILD PROTEIN-CALORIE MALNUTRITION (H): ICD-10-CM

## 2023-06-11 RX ORDER — COPPER GLUCONATE 2 MG
2 TABLET ORAL DAILY
Qty: 30 TABLET | Refills: 0 | Status: ON HOLD | OUTPATIENT
Start: 2023-06-11 | End: 2023-10-31

## 2023-06-11 RX ORDER — B-COMPLEX WITH VITAMIN C
1 TABLET ORAL DAILY
Qty: 90 TABLET | Refills: 1 | Status: SHIPPED | OUTPATIENT
Start: 2023-06-11 | End: 2023-11-21

## 2023-06-11 RX ORDER — PEDI MULTIVIT NO.128/VITAMIN K 500 MCG/ML
1 LIQUID (ML) ORAL 2 TIMES DAILY
Qty: 60 CAPSULE | Refills: 11 | Status: SHIPPED | OUTPATIENT
Start: 2023-06-11 | End: 2023-08-07

## 2023-06-12 DIAGNOSIS — Z98.84 S/P GASTRIC BYPASS: Primary | ICD-10-CM

## 2023-06-14 ENCOUNTER — HOSPITAL ENCOUNTER (OUTPATIENT)
Facility: CLINIC | Age: 43
Discharge: HOME | End: 2023-06-14
Attending: SURGERY | Admitting: SURGERY
Payer: COMMERCIAL

## 2023-06-14 VITALS
HEART RATE: 54 BPM | OXYGEN SATURATION: 99 % | SYSTOLIC BLOOD PRESSURE: 101 MMHG | RESPIRATION RATE: 16 BRPM | DIASTOLIC BLOOD PRESSURE: 64 MMHG

## 2023-06-14 LAB — UPPER GI ENDOSCOPY: NORMAL

## 2023-06-14 PROCEDURE — 43239 EGD BIOPSY SINGLE/MULTIPLE: CPT | Performed by: SURGERY

## 2023-06-14 PROCEDURE — 999N000099 HC STATISTIC MODERATE SEDATION < 10 MIN: Performed by: SURGERY

## 2023-06-14 PROCEDURE — 88305 TISSUE EXAM BY PATHOLOGIST: CPT | Mod: TC | Performed by: SURGERY

## 2023-06-14 PROCEDURE — 88305 TISSUE EXAM BY PATHOLOGIST: CPT | Mod: 26 | Performed by: PATHOLOGY

## 2023-06-14 PROCEDURE — 250N000009 HC RX 250: Performed by: SURGERY

## 2023-06-14 PROCEDURE — G0500 MOD SEDAT ENDO SERVICE >5YRS: HCPCS | Performed by: SURGERY

## 2023-06-14 PROCEDURE — 250N000011 HC RX IP 250 OP 636: Performed by: SURGERY

## 2023-06-14 RX ORDER — FENTANYL CITRATE 50 UG/ML
INJECTION, SOLUTION INTRAMUSCULAR; INTRAVENOUS PRN
Status: DISCONTINUED | OUTPATIENT
Start: 2023-06-14 | End: 2023-06-14 | Stop reason: HOSPADM

## 2023-06-14 ASSESSMENT — ACTIVITIES OF DAILY LIVING (ADL): ADLS_ACUITY_SCORE: 35

## 2023-06-14 NOTE — H&P
[facility] Anesthesia Pre-op History and Physical    Juanis Sparks MRN# [id number]   Age: 42 year old YOB: 1980                    Location [facility] [place]; 3rd floor endoscopy center          Primary care provider: Charito Rios         Chief Complaint and/or Reason for Procedure:   Prior sleeve converted to gastric bypass.    Malabsorption; low BMI         Active problem list:     Patient Active Problem List    Diagnosis Date Noted     S/P bariatric surgery 06/02/2023     Priority: Medium     Intestinal malabsorption, unspecified type 06/02/2023     Priority: Medium     Vitamin deficiency 06/02/2023     Priority: Medium     Hx of gastric ulcer 06/02/2023     Priority: Medium     Gastroesophageal reflux disease with esophagitis, unspecified whether hemorrhage 06/02/2023     Priority: Medium     Mild protein-calorie malnutrition (H) 06/02/2023     Priority: Medium     Epigastric pain 06/02/2023     Priority: Medium     Iron deficiency anemia following bariatric surgery 05/24/2023     Priority: Medium     Hyperparathyroidism, unspecified (H) 05/27/2021     Priority: Medium     Insomnia 05/27/2021     Priority: Medium     Formatting of this note might be different from the original.  She has tried various meds for insomnia: trazodone, nortriptyline, mirtazapine, melatonin.       Hx SBO 09/30/2020     Priority: Medium            Medications (include herbals and vitamins):     No current facility-administered medications for this encounter.             Allergies:      Allergies   Allergen Reactions     Amoxicillin Hives and Itching     Tetracycline Hives              Physical Exam:     Patient Vitals for the past 8 hrs:   BP Pulse Resp SpO2   06/14/23 1346 105/76 52 16 100 %     No intake/output data recorded.  Breathing unlabored  NAD            Lab / Radiology Results:   Guidelines for CXR: new or unstable cardio-pulmonary disease         Anesthetic risk and/or ASA classification:       Doyle  KEHINDE Roth MD     Plan    Upper Endoscopy to evaluate for ulcer disease      Doyle Roth MD  Surgery  [phone] (hospital )  [phone] (clinic nurses)

## 2023-06-14 NOTE — OR NURSING
Patient had EGD with biopsies  Patient tolerated procedure under conscious sedation and 2 liters nasal cannula.

## 2023-06-15 LAB
PATH REPORT.COMMENTS IMP SPEC: NORMAL
PATH REPORT.COMMENTS IMP SPEC: NORMAL
PATH REPORT.FINAL DX SPEC: NORMAL
PATH REPORT.GROSS SPEC: NORMAL
PATH REPORT.MICROSCOPIC SPEC OTHER STN: NORMAL
PATH REPORT.RELEVANT HX SPEC: NORMAL
PHOTO IMAGE: NORMAL

## 2023-06-30 ENCOUNTER — TELEPHONE (OUTPATIENT)
Dept: ENDOCRINOLOGY | Facility: CLINIC | Age: 43
End: 2023-06-30
Payer: COMMERCIAL

## 2023-06-30 DIAGNOSIS — Z98.84 S/P BARIATRIC SURGERY: Primary | ICD-10-CM

## 2023-06-30 DIAGNOSIS — E56.9 VITAMIN DEFICIENCY: ICD-10-CM

## 2023-06-30 DIAGNOSIS — K90.9 INTESTINAL MALABSORPTION, UNSPECIFIED TYPE: ICD-10-CM

## 2023-07-05 RX ORDER — COPPER GLUCONATE 2 MG
2 TABLET ORAL DAILY
Qty: 30 TABLET | Refills: 3 | Status: SHIPPED | OUTPATIENT
Start: 2023-07-05 | End: 2023-08-07

## 2023-08-04 ENCOUNTER — VIRTUAL VISIT (OUTPATIENT)
Dept: ENDOCRINOLOGY | Facility: CLINIC | Age: 43
End: 2023-08-04
Payer: COMMERCIAL

## 2023-08-04 VITALS — HEIGHT: 69 IN | WEIGHT: 140 LBS | BODY MASS INDEX: 20.73 KG/M2

## 2023-08-04 DIAGNOSIS — Z98.84 S/P BARIATRIC SURGERY: Primary | ICD-10-CM

## 2023-08-04 DIAGNOSIS — K90.9 INTESTINAL MALABSORPTION, UNSPECIFIED TYPE: ICD-10-CM

## 2023-08-04 DIAGNOSIS — E56.9 VITAMIN DEFICIENCY: ICD-10-CM

## 2023-08-04 DIAGNOSIS — Z87.11 HX OF GASTRIC ULCER: ICD-10-CM

## 2023-08-04 PROCEDURE — 99215 OFFICE O/P EST HI 40 MIN: CPT | Mod: VID | Performed by: NURSE PRACTITIONER

## 2023-08-04 RX ORDER — VITAMIN E 268 MG
400 CAPSULE ORAL DAILY
Qty: 90 CAPSULE | Refills: 3 | Status: SHIPPED | OUTPATIENT
Start: 2023-08-04

## 2023-08-04 ASSESSMENT — PAIN SCALES - GENERAL: PAINLEVEL: NO PAIN (0)

## 2023-08-04 NOTE — Clinical Note
I reordered copper -previously not able to get at pharmacy. I also ordered vit e and d. I hope e and d are appropriately water soluble. Patient need to call other pharmacies to find supplements as well- given she's had hard time getting copper from her pharmacy recently. Can someone touch base with her and let her know what I ordered and have her reach out if she needs a different pharmacy. We are doing this because the adek was not affordable for her (60/month) but I wonder if anyone has found something cheaper? Thanks!

## 2023-08-04 NOTE — NURSING NOTE
Is the patient currently in the state of [place]? YES    Visit mode:VIDEO    If the visit is dropped, the patient can be reconnected by: VIDEO VISIT: Text to cell phone: [phone]    Will anyone else be joining the visit? NO      How would you like to obtain your AVS? MyChart    Are changes needed to the allergy or medication list? NO    Reason for visit: Video Visit (Recheck)

## 2023-08-04 NOTE — LETTER
2023       RE: Juanis Sparks  [address]  [facility] [address]     Dear Colleague,    Thank you for referring your patient, Juanis Sparks, to the [facility] [place] at [facility]. Please see a copy of my visit note below.    Return Bariatric Surgery Note    RE: Juanis Sparks  MR#: [id number]  : 1980  VISIT DATE: Aug 4, 2023      Dear Charito Rios,    I had the pleasure of seeing your patient, Juanis Sparks, in my post-bariatric surgery assessment clinic.    Assessment & Plan  Problem List Items Addressed This Visit          Digestive    Intestinal malabsorption, unspecified type    Relevant Medications    Vitamin D 12.5 MCG/0.25ML LIQD    copper gluconate 2 MG tablet    Vitamin deficiency    Relevant Medications    Vitamin D 12.5 MCG/0.25ML LIQD    copper gluconate 2 MG tablet       Other    S/P bariatric surgery - Primary     Has increased omeprazole to 40bid per Dr. Roth after EGD. Persistent acid feeling in stomach and regurgitation. Sometimes avoids eating during the day to avoid abdominal pain and diarrhea. Often, forgetting to eat. Instead will snack on pretzels through the day to avoid acid. Protein and calorie intake is likely insufficient. Has hard time tolerating protein supplements. Has not been taking creon regularly as she is not regularly eating. Stressed the importance of adequate nutrition to improve how she is feeling overall. Based on EGD and labs, she does not likely absorb water soluble vitamins and therefore is having a hard time with nutrition. Creon can help her improving absorption and therefore nutrition. She needs to be getting 80-100g of protein but would be pleased with her getting 60g daily to start.     Patient expresses concerns about high PTH despite taking calcium and vitamin D regularly. She can not afford ADEK supplements. Also has not been able to obtain copper supplement from  pharmacy. Will work on finding separate components at the pharmacy. Strongly encourage trial of creon to improve absorption. Also considering follow up with endocrine.     Follow up  plan:  Stop eating 2 hours before bed  Take omeprazole 1 hour after dinner/evening snack  Take carafate 30 min before bed   Isopure protein- flavorless   Schedule meals and times to check on hydation- set an alarm  Take carafate with eating episodes  Follow up 2 months          Relevant Medications    vitamin E (TOCOPHEROL) 400 units (180 mg) capsule    Vitamin D 12.5 MCG/0.25ML LIQD    copper gluconate 2 MG tablet    Hx of gastric ulcer    Relevant Medications    vitamin E (TOCOPHEROL) 400 units (180 mg) capsule          40 minutes spent by me on the date of the encounter doing chart review, history and exam, documentation and further activities per the note    CHIEF COMPLAINT: Post-bariatric surgery follow-up. 6 years s/p Sleeve with emergent intestinal bypass to RNY with 12-14inches removed 1 month post sleeve with Vini Miranda    HISTORY OF PRESENT ILLNESS:      8/4/2023     8:45 AM   Questions Regarding Prior Weight Loss Surgery Reviewed With Patient   I had the following weight loss procedure Mary Ann-en-y Gastric Bypass   What year was your surgery? [id number]   How has your weight changed since your last visit? I have lost weight   Do you currently have any of the following Heartburn, acid reflux, or GERD (acid reflux disease)?   Do you have any concerns today? Just stomach pain     NRB 6/2/2023-Persistent difficulties with PO intake and loose stools since 2017.  Hx of gastric and intestinal ulcers, not on PPI. Numerous concerns at initial consult including epigastric pain, vomiting, reflux, regurgitation, dysphagia, anorexia, 8-10 greasy loose bowel movements daily, vitamin deficiency, malabsorption and malnutrition. Discussed oral nutrition as first line management of malnutrition/ vitamin Deficiency followed by leanna  placement if needed (patient requesting IV infusions for hydration). Discussed necessity of lifelong PPI treatment omeprazole 40mg started. Discussed trial of creon with meals to optimize absorption. Ordered EGD with Dr. Roth. Carafate for pain. Was already started with iron infusions.     Often forgets to eat/ drink through the day. Was going to work with RD. Stressed importance of trying to eat.     EGD 6/14/2023- esophagitis, small HH, dilated sleeve- no stricture, anatomy of LOOP DS. Dr. Roth wanted PPI doubled to 40bid     7/28/2023- pruritus, seen by PCP, described as bugs crawling without rash or skin changes. Referral to neurology.      Today:  Reflux- acid in stomach and regurgitation. taking omeprazole on empty stomach  Avoids eating during the day because she doesn't want abdominal pain and diarrhea     Recent diet changes:   Dinner around 9pm and bed at like 1030  --small baked potato and half piece of fish   No breakfast - too early in the morning, not hungry   Snacks through the day- cheese sticks, pretzel   Didn't tolerate protein shakes- (whey, [employer])     -Protein needs improvement   -Water needs improvement     Vitamins/Labs:   Updated vitamin labs with PCP recently   CBC and iron studies nor normal 7/2023   Vitamin A normal  B12 elevated   Vitamin D low - unchanged from June   PTH high - unchanged from June     Calcium citrate 600mg 3 per day   Persistent low vitamin E     Weight History:      8/4/2023     8:45 AM   --   What is your highest lifetime weight? 299   What is your lowest weight since surgery? (In pounds) 138     Initial Weight (lbs): 140 lbs  Weight: 63.5 kg (140 lb)     Cumulative weight loss (lbs): 0  Weight Loss Percentage: 0%        8/4/2023     8:45 AM   Questions Regarding Co-Morbidities and Health Concerns Reviewed With Patient   Pre-diabetes Never   Diabetes II Never   High Blood Pressure Never   High cholesterol Never   Heartburn/Reflux Worsened   Sleep apnea Stayed the  same   PCOS Never   Back pain Never   Joint pain Never   Lower leg swelling Never           8/4/2023     8:45 AM   Eating Habits   How many meals do you eat per day? 2   Do you snack between meals? Sometimes   How much food are you eating at each meal? 1/2 cup to 1 cup   Are you able to separate your meals and liquids by at least 30 minutes? Sometimes   Are you able to avoid liquid calories? Yes           8/4/2023     8:45 AM   Exercise Questions Reviewed With Patient   How often do you exercise? Daily   What is the duration of your exercise (in minutes)? 45 Minutes   What types of exercise do you do? walking    other   What keeps you from being more active? Too tired       Social History:      8/4/2023     8:45 AM   --   Are you smoking? No   Are you drinking alcohol? No       Medications:  Current Outpatient Medications   Medication    Calcium Carbonate-Vitamin D 600-5 MG-MCG TABS    copper gluconate 2 MG tablet    copper gluconate 2 MG tablet    cyanocobalamin (CYANOCOBALAMIN) 1000 MCG/ML injection    Ferrous Sulfate 324 MG TBEC    lipase-protease-amylase (CREON) 10195-66006-134804 units CPEP per EC capsule    omeprazole (PRILOSEC) 40 MG DR capsule    sucralfate (CARAFATE) 1 GM/10ML suspension    Vitamin A 3 MG (44717 UT) TABS    vitamin C (ASCORBIC ACID) 250 MG tablet    Vitamin D 12.5 MCG/0.25ML LIQD    vitamin D3 (CHOLECALCIFEROL) 1.25 MG (79479 UT) capsule    vitamin D3 (CHOLECALCIFEROL) 1.25 MG (48629 UT) capsule    vitamin D3 (CHOLECALCIFEROL) 125 MCG (5000 UT) tablet    vitamin E (TOCOPHEROL) 400 units (180 mg) capsule    Zinc 100 MG TABS    Cobalamin Combinations (B-12) 100-5000 MCG SUBL     No current facility-administered medications for this visit.         8/4/2023     8:45 AM   --   Do you avoid NSAIDs such as (Ibuprofen, Aleve, Naproxen, Advil)? Yes       ROS:  GI:       8/4/2023     8:45 AM   --   Vomiting Yes   Diarrhea Yes   Constipation Yes   Swallowing trouble Yes   Abdominal pain Yes    Heartburn Yes     Skin:       8/4/2023     8:45 AM   BAR RBS ROS - SKIN   Rash in skin folds No     Psych:       8/4/2023     8:45 AM   --   Depression No   Anxiety No     Female Only:       8/4/2023     8:45 AM   BAR RBS ROS -    Female only Polycystic ovarian syndrome (PCOS)   Stress urinary incontinence No       Admission on 06/14/2023, Discharged on 06/14/2023   Component Date Value Ref Range Status    Case Report 06/14/2023    Final                    Value:Surgical Pathology Report                         Case: [id number]                                  Authorizing Provider:  Doyle Roth MD   Collected:           06/14/2023 02:28 PM          Ordering Location:     [facility]          Received:            06/14/2023 02:41 PM                                 Endoscopy                                                                    Pathologist:           Ronak Gallo MD                                                                 Specimen:    Other, Distal esophagus biopsies                                                           Final Diagnosis 06/14/2023    Final                    Value:This result contains rich text formatting which cannot be displayed here.    Clinical Information 06/14/2023    Final                    Value:This result contains rich text formatting which cannot be displayed here.    Gross Description 06/14/2023    Final                    Value:This result contains rich text formatting which cannot be displayed here.    Microscopic Description 06/14/2023    Final                    Value:This result contains rich text formatting which cannot be displayed here.    Performing Labs 06/14/2023    Final                    Value:This result contains rich text formatting which cannot be displayed here.    Upper GI Endoscopy 06/14/2023    Final                    Value:M  "[address] [place] [address] [phone]     Endoscopy Department  _______________________________________________________________________________  Patient Name: Juanis Sparks            Procedure Date: 6/14/2023 2:02 PM  MRN: [id number]                       Account Number: [id number]  YOB: 1980              Admit Type: Outpatient  Age: 42                               Room: [facility]  Gender: Female                        Note Status: Finalized  Attending MD: MESFIN STRATTON MD,   Total Sedation Time:   _______________________________________________________________________________     Procedure:             Upper GI endoscopy  Indications:           Malnutrition, Weight loss  Providers:             MESFIN STRATTON MD, Yasmani Ramey RN  Patient Profile:       Juanis had sleeve surgery by Ethel Arreola in                          2017; developed need for emergency surgery and                                                    underwent conversion to \"gastric bypass\" at the time                          (note it is not a gastric bypass).                         Has had malabsorption with many daily bowel movements                          and very significant vitamin deficiency.  Referring MD:          JONEL MORRISSEY  Medicines:             Fentanyl 150 micrograms IV, Midazolam 5 mg IV  Complications:         No immediate complications.  _______________________________________________________________________________  Procedure:             Pre-Anesthesia Assessment:                         - Prior to the procedure, a History and Physical was                          performed, and patient medications and allergies were                          reviewed. The patient is competent. The risks and                          benefits of the procedure and the sedation options and                          risks were discussed with the patient. All " questions                          were answered and informed consent was o                          btained.                          Patient identification and proposed procedure were                          verified by the physician and the nurse in the                          procedure room. Mental Status Examination: alert and                          oriented. Airway Examination: normal oropharyngeal                          airway and neck mobility. Respiratory Examination:                          clear to auscultation. CV Examination: normal. ASA                          Grade Assessment: II - A patient with mild systemic                          disease. After reviewing the risks and benefits, the                          patient was deemed in satisfactory condition to                          undergo the procedure. The anesthesia plan was to use                          moderate sedation / analgesia (conscious sedation).                          Immediately prior to administration of medications,                          the patient was re-assessed for adequacy to receive                                                    sedatives. The heart rate, respiratory rate, oxygen                          saturations, blood pressure, adequacy of pulmonary                          ventilation, and response to care were monitored                          throughout the procedure. The physical status of the                          patient was re-assessed after the procedure.                         After obtaining informed consent, the endoscope was                          passed under direct vision. Throughout the procedure,                          the patient's blood pressure, pulse, and oxygen                          saturations were monitored continuously. The Endoscope                          was introduced through the mouth, and advanced to the                          afferent and efferent jejunal  loops. The upper GI                          endoscopy was accomplished with ease. The patient                          tolerated the procedure well.                                                                                                             Findings:       LA Grade A (one or more mucosal breaks less than 5 mm, not extending        between tops of 2 mucosal folds) esophagitis with no bleeding was found        at the gastroesophageal junction. Biopsies were taken with a cold        forceps for histology.       A small hiatal hernia was present.       Sleeve was quite dilated without any stricture or abnormality.       I found anatomy consistent with duodenal switch surgery and this is LOOP        DS anatomy; there is an afferent and efferent limb anastomosed to the        first part of the duodenum. No ulcers.       Note that the figure attached is NOT loop DS anatomy. This was DEVON or        loop DS. Details not clear due to lack of operative report available for        review.                                                                                   Impression:            - LA Grade A reflux esophagitis with no bleeding.                          Biopsied.                                                   - Small hiatal hernia.                         - Normal sleeve anatomy with duodenoenterostomy                          anastomosis and two outlets at proximal duodenum. No                          source of bleeding identified.  Recommendation:        - Await pathology results.                                                                                     _______________________  MESFIN STRATTON MD  6/14/2023 2:42:01 PM  I was physically present for the entire viewing portion of the exam.  __________________________  Signature of teaching physician  [id number] STRATTON MD  Number of Addenda: 0    Note Initiated On: 6/14/2023 2:02 PM  Scope In:  Scope Out:           PHYSICAL  "EXAM:  Objective   Ht 1.753 m (5' 9.02\")   Wt 63.5 kg (140 lb)   BMI 20.66 kg/m           Vitals:  No vitals were obtained today due to virtual visit.    Physical Exam   GENERAL: Healthy, alert and no distress  EYES: Eyes grossly normal to inspection.  No discharge or erythema, or obvious scleral/conjunctival abnormalities.  RESP: No audible wheeze, cough, or visible cyanosis.  No visible retractions or increased work of breathing.    SKIN: Visible skin clear. No significant rash, abnormal pigmentation or lesions.  NEURO: Cranial nerves grossly intact.  Mentation and speech appropriate for age.  PSYCH: Mentation appears normal, affect normal/bright, judgement and insight intact, normal speech and appearance well-groomed.        Sincerely,    Britta Mcgee NP      Virtual Visit Details    Type of service:  Video Visit     Originating Location (pt. Location): Home    Distant Location (provider location):  Off-site  Platform used for Video Visit: Oni"

## 2023-08-04 NOTE — PROGRESS NOTES
Return Bariatric Surgery Note    RE: Juanis Sparks  MR#: [id number]  : 1980  VISIT DATE: Aug 4, 2023      Dear Charito Rios,    I had the pleasure of seeing your patient, Juanis Sparks, in my post-bariatric surgery assessment clinic.    Assessment & Plan   Problem List Items Addressed This Visit          Digestive    Intestinal malabsorption, unspecified type    Relevant Medications    Vitamin D 12.5 MCG/0.25ML LIQD    copper gluconate 2 MG tablet    Vitamin deficiency    Relevant Medications    Vitamin D 12.5 MCG/0.25ML LIQD    copper gluconate 2 MG tablet       Other    S/P bariatric surgery - Primary     Has increased omeprazole to 40bid per Dr. Roth after EGD. Persistent acid feeling in stomach and regurgitation. Sometimes avoids eating during the day to avoid abdominal pain and diarrhea. Often, forgetting to eat. Instead will snack on pretzels through the day to avoid acid. Protein and calorie intake is likely insufficient. Has hard time tolerating protein supplements. Has not been taking creon regularly as she is not regularly eating. Stressed the importance of adequate nutrition to improve how she is feeling overall. Based on EGD and labs, she does not likely absorb water soluble vitamins and therefore is having a hard time with nutrition. Creon can help her improving absorption and therefore nutrition. She needs to be getting 80-100g of protein but would be pleased with her getting 60g daily to start.     Patient expresses concerns about high PTH despite taking calcium and vitamin D regularly. She can not afford ADEK supplements. Also has not been able to obtain copper supplement from pharmacy. Will work on finding separate components at the pharmacy. Strongly encourage trial of creon to improve absorption. Also considering follow up with endocrine.     Follow up  plan:  Stop eating 2 hours before bed  Take omeprazole 1 hour after dinner/evening snack  Take carafate 30 min before bed   Isopure  protein- flavorless   Schedule meals and times to check on hydation- set an alarm  Take carafate with eating episodes  Follow up 2 months          Relevant Medications    vitamin E (TOCOPHEROL) 400 units (180 mg) capsule    Vitamin D 12.5 MCG/0.25ML LIQD    copper gluconate 2 MG tablet    Hx of gastric ulcer    Relevant Medications    vitamin E (TOCOPHEROL) 400 units (180 mg) capsule          40 minutes spent by me on the date of the encounter doing chart review, history and exam, documentation and further activities per the note    CHIEF COMPLAINT: Post-bariatric surgery follow-up. 6 years s/p Sleeve with emergent intestinal bypass to RNY with 12-14inches removed 1 month post sleeve with Vini Miranda    HISTORY OF PRESENT ILLNESS:      8/4/2023     8:45 AM   Questions Regarding Prior Weight Loss Surgery Reviewed With Patient   I had the following weight loss procedure Mary Ann-en-y Gastric Bypass   What year was your surgery? 5622-2285   How has your weight changed since your last visit? I have lost weight   Do you currently have any of the following Heartburn, acid reflux, or GERD (acid reflux disease)?   Do you have any concerns today? Just stomach pain     NRB 6/2/2023-Persistent difficulties with PO intake and loose stools since 2017.  Hx of gastric and intestinal ulcers, not on PPI. Numerous concerns at initial consult including epigastric pain, vomiting, reflux, regurgitation, dysphagia, anorexia, 8-10 greasy loose bowel movements daily, vitamin deficiency, malabsorption and malnutrition. Discussed oral nutrition as first line management of malnutrition/ vitamin Deficiency followed by gtube placement if needed (patient requesting IV infusions for hydration). Discussed necessity of lifelong PPI treatment omeprazole 40mg started. Discussed trial of creon with meals to optimize absorption. Ordered EGD with Dr. Roth. Howieafjose for pain. Was already started with iron infusions.     Often forgets to eat/ drink  through the day. Was going to work with RD. Stressed importance of trying to eat.     EGD 6/14/2023- esophagitis, small HH, dilated sleeve- no stricture, anatomy of LOOP DS. Dr. Roth wanted PPI doubled to 40bid     7/28/2023- pruritus, seen by PCP, described as bugs crawling without rash or skin changes. Referral to neurology.      Today:  Reflux- acid in stomach and regurgitation. taking omeprazole on empty stomach  Avoids eating during the day because she doesn't want abdominal pain and diarrhea     Recent diet changes:   Dinner around 9pm and bed at like 1030  --small baked potato and half piece of fish   No breakfast - too early in the morning, not hungry   Snacks through the day- cheese sticks, pretzel   Didn't tolerate protein shakes- (whey, [employer])     -Protein needs improvement   -Water needs improvement     Vitamins/Labs:   Updated vitamin labs with PCP recently   CBC and iron studies nor normal 7/2023   Vitamin A normal  B12 elevated   Vitamin D low - unchanged from June   PTH high - unchanged from June     Calcium citrate 600mg 3 per day   Persistent low vitamin E     Weight History:      8/4/2023     8:45 AM   --   What is your highest lifetime weight? 299   What is your lowest weight since surgery? (In pounds) 138     Initial Weight (lbs): 140 lbs  Weight: 63.5 kg (140 lb)     Cumulative weight loss (lbs): 0  Weight Loss Percentage: 0%        8/4/2023     8:45 AM   Questions Regarding Co-Morbidities and Health Concerns Reviewed With Patient   Pre-diabetes Never   Diabetes II Never   High Blood Pressure Never   High cholesterol Never   Heartburn/Reflux Worsened   Sleep apnea Stayed the same   PCOS Never   Back pain Never   Joint pain Never   Lower leg swelling Never           8/4/2023     8:45 AM   Eating Habits   How many meals do you eat per day? 2   Do you snack between meals? Sometimes   How much food are you eating at each meal? 1/2 cup to 1 cup   Are you able to separate your meals and liquids  by at least 30 minutes? Sometimes   Are you able to avoid liquid calories? Yes           8/4/2023     8:45 AM   Exercise Questions Reviewed With Patient   How often do you exercise? Daily   What is the duration of your exercise (in minutes)? 45 Minutes   What types of exercise do you do? walking    other   What keeps you from being more active? Too tired       Social History:      8/4/2023     8:45 AM   --   Are you smoking? No   Are you drinking alcohol? No       Medications:  Current Outpatient Medications   Medication    Calcium Carbonate-Vitamin D 600-5 MG-MCG TABS    copper gluconate 2 MG tablet    copper gluconate 2 MG tablet    cyanocobalamin (CYANOCOBALAMIN) 1000 MCG/ML injection    Ferrous Sulfate 324 MG TBEC    lipase-protease-amylase (CREON) 42025-81485-202606 units CPEP per EC capsule    omeprazole (PRILOSEC) 40 MG DR capsule    sucralfate (CARAFATE) 1 GM/10ML suspension    Vitamin A 3 MG (93584 UT) TABS    vitamin C (ASCORBIC ACID) 250 MG tablet    Vitamin D 12.5 MCG/0.25ML LIQD    vitamin D3 (CHOLECALCIFEROL) 1.25 MG (73742 UT) capsule    vitamin D3 (CHOLECALCIFEROL) 1.25 MG (47236 UT) capsule    vitamin D3 (CHOLECALCIFEROL) 125 MCG (5000 UT) tablet    vitamin E (TOCOPHEROL) 400 units (180 mg) capsule    Zinc 100 MG TABS    Cobalamin Combinations (B-12) 100-5000 MCG SUBL     No current facility-administered medications for this visit.         8/4/2023     8:45 AM   --   Do you avoid NSAIDs such as (Ibuprofen, Aleve, Naproxen, Advil)? Yes       ROS:  GI:       8/4/2023     8:45 AM   --   Vomiting Yes   Diarrhea Yes   Constipation Yes   Swallowing trouble Yes   Abdominal pain Yes   Heartburn Yes     Skin:       8/4/2023     8:45 AM   BAR RBS ROS - SKIN   Rash in skin folds No     Psych:       8/4/2023     8:45 AM   --   Depression No   Anxiety No     Female Only:       8/4/2023     8:45 AM   BAR RBS ROS -    Female only Polycystic ovarian syndrome (PCOS)   Stress urinary incontinence No       Admission  on 06/14/2023, Discharged on 06/14/2023   Component Date Value Ref Range Status    Case Report 06/14/2023    Final                    Value:Surgical Pathology Report                         Case: [id number]                                  Authorizing Provider:  Doyle Roth MD   Collected:           06/14/2023 02:28 PM          Ordering Location:     [facility]          Received:            06/14/2023 02:41 PM                                 Endoscopy                                                                    Pathologist:           Ronak Gallo MD                                                                 Specimen:    Other, Distal esophagus biopsies                                                           Final Diagnosis 06/14/2023    Final                    Value:This result contains rich text formatting which cannot be displayed here.    Clinical Information 06/14/2023    Final                    Value:This result contains rich text formatting which cannot be displayed here.    Gross Description 06/14/2023    Final                    Value:This result contains rich text formatting which cannot be displayed here.    Microscopic Description 06/14/2023    Final                    Value:This result contains rich text formatting which cannot be displayed here.    Performing Labs 06/14/2023    Final                    Value:This result contains rich text formatting which cannot be displayed here.    Upper GI Endoscopy 06/14/2023    Final                    Value:[address] [place] [address] [phone]     Endoscopy Department  _______________________________________________________________________________  Patient Name: Juanis Sparks            Procedure Date: 6/14/2023 2:02 PM  MRN: [id number]                       Account Number:  [id number]  YOB: 1980              Admit Type: Outpatient  Age: 42                               Room:  #1  Gender: Female                        Note Status: Finalized  Attending MD: MESFIN STRATTON MD,   Total Sedation Time:   _______________________________________________________________________________     Procedure:             Upper GI endoscopy  Indications:           Malnutrition, Weight loss  Providers:             MESFIN STRATTON MD, Yasmani Ramey RN  Patient Profile:       Juanis had sleeve surgery by Ethel Arreola in                          2017; developed need for emergency surgery and                                                    underwent conversion to \"gastric bypass\" at the time                          (note it is not a gastric bypass).                         Has had malabsorption with many daily bowel movements                          and very significant vitamin deficiency.  Referring MD:          JONEL MORRISSEY  Medicines:             Fentanyl 150 micrograms IV, Midazolam 5 mg IV  Complications:         No immediate complications.  _______________________________________________________________________________  Procedure:             Pre-Anesthesia Assessment:                         - Prior to the procedure, a History and Physical was                          performed, and patient medications and allergies were                          reviewed. The patient is competent. The risks and                          benefits of the procedure and the sedation options and                          risks were discussed with the patient. All questions                          were answered and informed consent was o                          btained.                          Patient identification and proposed procedure were                          verified by the physician and the nurse in the                          procedure room. Mental Status Examination: " alert and                          oriented. Airway Examination: normal oropharyngeal                          airway and neck mobility. Respiratory Examination:                          clear to auscultation. CV Examination: normal. ASA                          Grade Assessment: II - A patient with mild systemic                          disease. After reviewing the risks and benefits, the                          patient was deemed in satisfactory condition to                          undergo the procedure. The anesthesia plan was to use                          moderate sedation / analgesia (conscious sedation).                          Immediately prior to administration of medications,                          the patient was re-assessed for adequacy to receive                                                    sedatives. The heart rate, respiratory rate, oxygen                          saturations, blood pressure, adequacy of pulmonary                          ventilation, and response to care were monitored                          throughout the procedure. The physical status of the                          patient was re-assessed after the procedure.                         After obtaining informed consent, the endoscope was                          passed under direct vision. Throughout the procedure,                          the patient's blood pressure, pulse, and oxygen                          saturations were monitored continuously. The Endoscope                          was introduced through the mouth, and advanced to the                          afferent and efferent jejunal loops. The upper GI                          endoscopy was accomplished with ease. The patient                          tolerated the procedure well.                                                                                                             Findings:       LA Grade A (one or more mucosal breaks less than 5 mm,  "not extending        between tops of 2 mucosal folds) esophagitis with no bleeding was found        at the gastroesophageal junction. Biopsies were taken with a cold        forceps for histology.       A small hiatal hernia was present.       Sleeve was quite dilated without any stricture or abnormality.       I found anatomy consistent with duodenal switch surgery and this is LOOP        DS anatomy; there is an afferent and efferent limb anastomosed to the        first part of the duodenum. No ulcers.       Note that the figure attached is NOT loop DS anatomy. This was DEVON or        loop DS. Details not clear due to lack of operative report available for        review.                                                                                   Impression:            - LA Grade A reflux esophagitis with no bleeding.                          Biopsied.                                                   - Small hiatal hernia.                         - Normal sleeve anatomy with duodenoenterostomy                          anastomosis and two outlets at proximal duodenum. No                          source of bleeding identified.  Recommendation:        - Await pathology results.                                                                                     _______________________  MESFIN STRATTON MD  6/14/2023 2:42:01 PM  I was physically present for the entire viewing portion of the exam.  __________________________  Signature of teaching physician  [id number] STRATTON MD  Number of Addenda: 0    Note Initiated On: 6/14/2023 2:02 PM  Scope In:  Scope Out:           PHYSICAL EXAM:  Objective    Ht 1.753 m (5' 9.02\")   Wt 63.5 kg (140 lb)   BMI 20.66 kg/m           Vitals:  No vitals were obtained today due to virtual visit.    Physical Exam   GENERAL: Healthy, alert and no distress  EYES: Eyes grossly normal to inspection.  No discharge or erythema, or obvious scleral/conjunctival " abnormalities.  RESP: No audible wheeze, cough, or visible cyanosis.  No visible retractions or increased work of breathing.    SKIN: Visible skin clear. No significant rash, abnormal pigmentation or lesions.  NEURO: Cranial nerves grossly intact.  Mentation and speech appropriate for age.  PSYCH: Mentation appears normal, affect normal/bright, judgement and insight intact, normal speech and appearance well-groomed.        Sincerely,    Britta Mcgee, NP

## 2023-08-04 NOTE — PATIENT INSTRUCTIONS
"Lane Olivarez, it was nice to meet you today!  Thank you for allowing us the privilege of caring for you. We hope we provided you with the excellent service you deserve.   Please let us know if there is anything else we can do for you so that we can be sure you are completely satisfied with your care experience.    To ensure the quality of our services you may be receiving a patient satisfaction survey from an independent patient satisfaction monitoring company.    The greatest compliment you can give is a \"Likely to Recommend\"    Your visit was with Britta Mcgee NP today.    Instructions per today's visit:     Follow up  plan:  Stop eating 2 hours before bed  Take omeprazole 1 hour after dinner/evening snack  Take carafate 30 min before bed   Isopure protein- flavorless   Schedule meals and times to check on hydation- set an alarm  Take carafate with eating episodes  Follow up 2 months   ___________________________________________________________________________  Important contact and scheduling information:  Please call our contact center at [phone] to schedule your next appointments.  For any nursing questions or concerns call Delma Ellis LPN at [phone] or Zina Montes RN at [phone]  Please call during clinic hours Monday through Friday 8:00a - 4:00p if you have questions or you can contact us via [employer] at anytime and we will reply during clinic hours.    Lab results will be communicated through My Chart or letter (if My Chart not used). Please call the clinic if you have not received communication after 1 week or if you have any questions.?  Clinic Fax: [phone]  __________________________________________________________________________    If labs were ordered today:    Please make an appointment to have them drawn at your convenience.     To schedule the Lab Appointment using [employer]:  Select \"Schedule an Appointment\"  Select \"Lab Only\"  For \"A couple of questions\", select \"Other\"  For \"Which " locations work for you?, select the location and set up the appointment    To schedule by phone call [phone] to schedule a lab only appointment at any [facility] lab.  ___________________________________________________________________________  Work with A Health !  Virtual Sessions are Available through [facility] Clinics    To learn more, call to schedule a free, Health  Q&A appointment: [phone]     What is Health Coaching?  Do you know what you are supposed to do, but you just aren't doing it?  Then, HEALTH COACHING may help you!   Get unstuck and move forward with the support of a professionally trained NBC-HWC (National Board-Certified Health and ) who uses evidence-based approaches to help you move forward with healthy lifestyle changes in the areas of weight loss, stress management and overall well-being.    Health Coaches help you identify goals that will work best for you. Health Coaches provide support and encouragement with overcoming barriers and help you to find inspiration and motivation to lead a healthy lifestyle.    Option one:  Health Coaching 3-Pack; Three, 30-minute Health Coaching Visits, for $99  Visits are done virtually (phone or video)  This is a self pay service; we do not accept insurance for colette coaching.    Option two:   The 24 week Plan; 11 Health Coaching Visits, and a 7 months subscription to [employer]-- on-demand fitness, nutrition and mindfulness classes, for $499 (employee discounts may be available). Participants will also meet regularly with a weight management Medical Provider and a Registered/Licensed Dietician.  This is a self-pay service; we do not accept insurance for health coaching.    To Schedule a free Health  Q&A appointment to learn more,  call [phone].  ____________________________________________________________________    [facility]   Healthy  Lifestyle Virtual Support Group    Healthy Lifestyle Virtual Support Group?  This is 60 minutes of small group guided discussion, support and resources. All are welcome who want a healthy lifestyle.  WHEN: Starting in July 2023, this group meets the 1st Friday of the month from 12:30 PM - 1:30 PM virtually using [employer] Teams.    FACILITATOR: Led by National Board Certified Health and , Lana Rogers [facility].   TO REGISTER: Please send an email to Lana [email or web address] to receive monthly invites to the group or if you have any questions about having a health .  Prior to the meeting, a link with directions on how to join the meeting will be sent to you.    2023 Meetings  May 19: Let's Talk  June 9: Create Your Coaching Toolkit: Learn How to  Yourself  July 7: Let's Talk  August 4: Benefits of Fiber with LAURA Isbell  September 1: Show and Tell (share your aps, podcasts, recipes, hacks, books)  October 6 :Let's Talk  November 3: Introduction to Mindfulness   December 1: Let's Talk    If you would like bariatric surgery specific support group info please let your care team know.         Thank you,   [facility] Comprehensive Weight Management Team

## 2023-08-07 ENCOUNTER — CARE COORDINATION (OUTPATIENT)
Dept: ENDOCRINOLOGY | Facility: CLINIC | Age: 43
End: 2023-08-07
Payer: COMMERCIAL

## 2023-08-07 RX ORDER — COPPER GLUCONATE 2 MG
2 TABLET ORAL DAILY
Qty: 90 TABLET | Refills: 1 | Status: SHIPPED | OUTPATIENT
Start: 2023-08-07 | End: 2023-11-21

## 2023-08-07 NOTE — PROGRESS NOTES
Let patient know that her vitamin prescriptions were called to her pharmacy. If unable to find copper she can call around and have the prescription transferred to the pharmacy of her choice. Patient verbalized understanding.

## 2023-08-07 NOTE — ASSESSMENT & PLAN NOTE
Has increased omeprazole to 40bid per Dr. Roth after EGD. Persistent acid feeling in stomach and regurgitation. Sometimes avoids eating during the day to avoid abdominal pain and diarrhea. Often, forgetting to eat. Instead will snack on pretzels through the day to avoid acid. Protein and calorie intake is likely insufficient. Has hard time tolerating protein supplements. Has not been taking creon regularly as she is not regularly eating. Stressed the importance of adequate nutrition to improve how she is feeling overall. Based on EGD and labs, she does not likely absorb water soluble vitamins and therefore is having a hard time with nutrition. Creon can help her improving absorption and therefore nutrition. She needs to be getting 80-100g of protein but would be pleased with her getting 60g daily to start.     Patient expresses concerns about high PTH despite taking calcium and vitamin D regularly. She can not afford ADEK supplements. Also has not been able to obtain copper supplement from pharmacy. Will work on finding separate components at the pharmacy. Strongly encourage trial of creon to improve absorption. Also considering follow up with endocrine.     Follow up  plan:  Stop eating 2 hours before bed  Take omeprazole 1 hour after dinner/evening snack  Take carafate 30 min before bed   Isopure protein- flavorless   Schedule meals and times to check on hydation- set an alarm  Take carafate with eating episodes  Follow up 2 months

## 2023-08-10 ENCOUNTER — VIRTUAL VISIT (OUTPATIENT)
Dept: ENDOCRINOLOGY | Facility: CLINIC | Age: 43
End: 2023-08-10
Payer: COMMERCIAL

## 2023-08-10 VITALS — BODY MASS INDEX: 21.48 KG/M2 | HEIGHT: 69 IN | WEIGHT: 145 LBS

## 2023-08-10 DIAGNOSIS — K21.00 GASTROESOPHAGEAL REFLUX DISEASE WITH ESOPHAGITIS, UNSPECIFIED WHETHER HEMORRHAGE: Primary | ICD-10-CM

## 2023-08-10 PROCEDURE — 99213 OFFICE O/P EST LOW 20 MIN: CPT | Mod: VID | Performed by: SURGERY

## 2023-08-10 ASSESSMENT — PAIN SCALES - GENERAL: PAINLEVEL: NO PAIN (0)

## 2023-08-10 NOTE — NURSING NOTE
"(   Chief Complaint   Patient presents with    RECHECK     Return bariatric    )    ( Weight: 65.8 kg (145 lb) (Pt reported) )  ( Height: 175.3 cm (5' 9\") )  ( BMI (Calculated): 21.41 )  (   )  (   )  (   )  (   )  (   )  (   )    (   )  (   )  (   )  (   )  (   )  (   )  (   )    (   Patient Active Problem List   Diagnosis    S/P bariatric surgery    Intestinal malabsorption, unspecified type    Vitamin deficiency    Hx of gastric ulcer    Gastroesophageal reflux disease with esophagitis, unspecified whether hemorrhage    Mild protein-calorie malnutrition (H)    Epigastric pain    Hx SBO    Hyperparathyroidism, unspecified (H)    Insomnia    Iron deficiency anemia following bariatric surgery    )  (   Current Outpatient Medications   Medication Sig Dispense Refill    Calcium Carbonate-Vitamin D 600-5 MG-MCG TABS Take 1 tablet by mouth      copper gluconate 2 MG tablet Take 1 tablet (2 mg) by mouth daily 90 tablet 1    copper gluconate 2 MG tablet Take 1 tablet (2 mg) by mouth daily 30 tablet 0    cyanocobalamin (CYANOCOBALAMIN) 1000 MCG/ML injection Inject 1,000 mcg into the muscle      Ferrous Sulfate 324 MG TBEC Take 1 tablet by mouth daily      lipase-protease-amylase (CREON) 34970-45925-364241 units CPEP per EC capsule Take 3 capsules by mouth 3 times daily (with meals) 270 capsule 3    omeprazole (PRILOSEC) 40 MG DR capsule Take 1 capsule (40 mg) by mouth daily 90 capsule 3    sucralfate (CARAFATE) 1 GM/10ML suspension Take 10 mLs (1 g) by mouth 4 times daily as needed (epigastric pain, regurgitation) 414 mL 3    Vitamin A 3 MG (14265 UT) TABS Vitamin A. Taking 1000 units PO daily.      vitamin C (ASCORBIC ACID) 250 MG tablet Take 1 tablet (250 mg) by mouth 3 times daily 90 tablet 3    Vitamin D 12.5 MCG/0.25ML LIQD Take 2.5 mLs by mouth daily Goal is 5,000 international unit(s) of water miscible vitamin D 3 daily (125mcg) 240 mL 3    vitamin D3 (CHOLECALCIFEROL) 1.25 MG (33789 UT) capsule Take 1 capsule " (50,000 Units) by mouth daily 90 capsule 0    vitamin D3 (CHOLECALCIFEROL) 1.25 MG (53648 UT) capsule 1 cap by mouth 4 days a week (M/W/F/Saturday)      vitamin D3 (CHOLECALCIFEROL) 125 MCG (5000 UT) tablet Take 5,000 Units by mouth      vitamin E (TOCOPHEROL) 400 units (180 mg) capsule Take 1 capsule (400 Units) by mouth daily 90 capsule 3    Zinc 100 MG TABS Take 1 tablet (50 mg) by mouth daily 90 tablet 1    Cobalamin Combinations (B-12) 100-5000 MCG SUBL Place 5,000 mcg under the tongue (Patient not taking: Reported on 8/4/2023)      )  ( Diabetes Eval:    )    ( Pain Eval:  No Pain (0) )    ( Wound Eval:       )    (   History   Smoking Status    Never   Smokeless Tobacco    Never    )    ( Signed By:  Jimy Connors, EMT; August 10, 2023; 12:18 PM )

## 2023-08-10 NOTE — PROGRESS NOTES
Juanis Sparks is a 42 year old who is being evaluated via a billable video visit.      How would you like to obtain your AVS? MyChart  If the video visit is dropped, the invitation should be resent by: Text to cell phone: [phone]  Will anyone else be joining your video visit? No  If patient encounters technical issues they should call [phone]    During this virtual visit the patient is located in [place], patient verifies this as the location during the entirety of this visit.     Video-Visit Details  Video Start Time:  1230    Type of service:  Video Visit    Video End Time: 1250    Originating Location (pt. Location): Home    Distant Location (provider location):  On-site    Platform used for Video Visit: ALEXEY Slade 8/10/2023      11:08 AM            Return Bariatric Surgery Note    RE: Juanis Sparks  MR#: [id number]  : 1980  VISIT DATE: Aug 10, 2023    Dear Charito Rios,    I had the pleasure of seeing your patient, Juanis Sparks, in my post-bariatric surgery assessment clinic.    CHIEF COMPLAINT: Post-bariatric surgery follow-up    HISTORY OF PRESENT ILLNESS:    Lost 100 lbs immediately     Has severe reflux.  Had it after sleeve.  Was converted urgently in the first month after sleeve to a 'gastric bypass', though the details of this are unclear and upper endoscopy more consistent with some sort of duodenal switch.        2023     8:45 AM   Questions Regarding Prior Weight Loss Surgery Reviewed With Patient   I had the following weight loss procedure Mary Ann-en-y Gastric Bypass   What year was your surgery? [id number]   How has your weight changed since your last visit? I have lost weight   Do you currently have any of the following Heartburn, acid reflux, or GERD (acid reflux disease)?   Do you have any concerns today? Just stomach pain       Weight History:      2023     8:45 AM   --   What is your highest lifetime weight? 299   What is your lowest weight since surgery? (In  pounds) 138        Weight: 65.8 kg (145 lb) (Pt reported)                 8/4/2023     8:45 AM   Questions Regarding Co-Morbidities and Health Concerns Reviewed With Patient   Pre-diabetes Never   Diabetes II Never   High Blood Pressure Never   High cholesterol Never   Heartburn/Reflux Worsened   Sleep apnea Stayed the same   PCOS Never   Back pain Never   Joint pain Never   Lower leg swelling Never           8/4/2023     8:45 AM   Eating Habits   How many meals do you eat per day? 2   Do you snack between meals? Sometimes   How much food are you eating at each meal? 1/2 cup to 1 cup   Are you able to separate your meals and liquids by at least 30 minutes? Sometimes   Are you able to avoid liquid calories? Yes           8/4/2023     8:45 AM   Exercise Questions Reviewed With Patient   How often do you exercise? Daily   What is the duration of your exercise (in minutes)? 45 Minutes   What types of exercise do you do? walking    other   What keeps you from being more active? Too tired       Social History:      8/4/2023     8:45 AM   --   Are you smoking? No   Are you drinking alcohol? No       Medications:  Current Outpatient Medications   Medication    Calcium Carbonate-Vitamin D 600-5 MG-MCG TABS    copper gluconate 2 MG tablet    copper gluconate 2 MG tablet    cyanocobalamin (CYANOCOBALAMIN) 1000 MCG/ML injection    Ferrous Sulfate 324 MG TBEC    lipase-protease-amylase (CREON) 30465-23777-687243 units CPEP per EC capsule    omeprazole (PRILOSEC) 40 MG DR capsule    sucralfate (CARAFATE) 1 GM/10ML suspension    Vitamin A 3 MG (97494 UT) TABS    vitamin C (ASCORBIC ACID) 250 MG tablet    Vitamin D 12.5 MCG/0.25ML LIQD    vitamin D3 (CHOLECALCIFEROL) 1.25 MG (27279 UT) capsule    vitamin D3 (CHOLECALCIFEROL) 1.25 MG (02804 UT) capsule    vitamin D3 (CHOLECALCIFEROL) 125 MCG (5000 UT) tablet    vitamin E (TOCOPHEROL) 400 units (180 mg) capsule    Zinc 100 MG TABS    Cobalamin Combinations (B-12) 100-5000 MCG SUBL  "    No current facility-administered medications for this visit.         8/4/2023     8:45 AM   --   Do you avoid NSAIDs such as (Ibuprofen, Aleve, Naproxen, Advil)? Yes       ROS:  GI:       8/4/2023     8:45 AM   --   Vomiting Yes   Diarrhea Yes   Constipation Yes   Swallowing trouble Yes   Abdominal pain Yes   Heartburn Yes     Skin:       8/4/2023     8:45 AM   BAR RBS ROS - SKIN   Rash in skin folds No     Psych:       8/4/2023     8:45 AM   --   Depression No   Anxiety No     :       8/4/2023     8:45 AM   BAR RBS ROS -    Female only Polycystic ovarian syndrome (PCOS)   Stress urinary incontinence No       LABS/IMAGING/MEDICAL RECORDS REVIEW:     N/a    PHYSICAL EXAMINATION:  Ht 1.753 m (5' 9\")   Wt 65.8 kg (145 lb)   BMI 21.41 kg/m     NAD  Rest of exam deferred due to virtual visit      ASSESSMENT AND PLAN:      1. Several  years status laparoscopic gastric sleeve  2. Morbid Obesity historical current BMI: Body mass index is 21.41 kg/m .  3. Post surgical malabsorption:   Labs ordered per protocol.   Follow food plan per dietitian recommendations.   Continue taking recommended post-op vitamins.  4. Return to clinic after EGD.    Sincerely,    Doyle Roth MD    I spent a total of 30 minutes face to face with Juanis during today's office visit. Over 50% of this time was spent counseling the patient and/or coordinating care.  "

## 2023-08-10 NOTE — LETTER
8/10/2023       RE: Juanis Sparks  [address]  [facility] [address]     Dear Colleague,    Thank you for referring your patient, Juanis Sparks, to the [facility] [place] [facility]. Please see a copy of my visit note below.    Juanis Sparks is a 42 year old who is being evaluated via a billable video visit.      How would you like to obtain your AVS? MyChart  If the video visit is dropped, the invitation should be resent by: Text to cell phone: [phone]  Will anyone else be joining your video visit? No  If patient encounters technical issues they should call [phone]    During this virtual visit the patient is located in [place], patient verifies this as the location during the entirety of this visit.     Video-Visit Details  Video Start Time:  1230    Type of service:  Video Visit    Video End Time: 1250    Originating Location (pt. Location): Home    Distant Location (provider location):  On-site    Platform used for Video Visit: ALEXEY Slade 8/10/2023      11:08 AM            Return Bariatric Surgery Note    RE: Juanis Sparks  MR#: [id number]  : 1980  VISIT DATE: Aug 10, 2023    Dear Charito Rios,    I had the pleasure of seeing your patient, Juanis Sparks, in my post-bariatric surgery assessment clinic.    CHIEF COMPLAINT: Post-bariatric surgery follow-up    HISTORY OF PRESENT ILLNESS:    Lost 100 lbs immediately     Has severe reflux.  Had it after sleeve.  Was converted urgently in the first month after sleeve to a 'gastric bypass', though the details of this are unclear and upper endoscopy more consistent with some sort of duodenal switch.        2023     8:45 AM   Questions Regarding Prior Weight Loss Surgery Reviewed With Patient   I had the following weight loss procedure Mary Ann-en-y Gastric Bypass   What year was your surgery? [id number]   How has your weight changed since your last visit? I  have lost weight   Do you currently have any of the following Heartburn, acid reflux, or GERD (acid reflux disease)?   Do you have any concerns today? Just stomach pain       Weight History:      8/4/2023     8:45 AM   --   What is your highest lifetime weight? 299   What is your lowest weight since surgery? (In pounds) 138        Weight: 65.8 kg (145 lb) (Pt reported)                 8/4/2023     8:45 AM   Questions Regarding Co-Morbidities and Health Concerns Reviewed With Patient   Pre-diabetes Never   Diabetes II Never   High Blood Pressure Never   High cholesterol Never   Heartburn/Reflux Worsened   Sleep apnea Stayed the same   PCOS Never   Back pain Never   Joint pain Never   Lower leg swelling Never           8/4/2023     8:45 AM   Eating Habits   How many meals do you eat per day? 2   Do you snack between meals? Sometimes   How much food are you eating at each meal? 1/2 cup to 1 cup   Are you able to separate your meals and liquids by at least 30 minutes? Sometimes   Are you able to avoid liquid calories? Yes           8/4/2023     8:45 AM   Exercise Questions Reviewed With Patient   How often do you exercise? Daily   What is the duration of your exercise (in minutes)? 45 Minutes   What types of exercise do you do? walking    other   What keeps you from being more active? Too tired       Social History:      8/4/2023     8:45 AM   --   Are you smoking? No   Are you drinking alcohol? No       Medications:  Current Outpatient Medications   Medication    Calcium Carbonate-Vitamin D 600-5 MG-MCG TABS    copper gluconate 2 MG tablet    copper gluconate 2 MG tablet    cyanocobalamin (CYANOCOBALAMIN) 1000 MCG/ML injection    Ferrous Sulfate 324 MG TBEC    lipase-protease-amylase (CREON) 74401-80456-929770 units CPEP per EC capsule    omeprazole (PRILOSEC) 40 MG DR capsule    sucralfate (CARAFATE) 1 GM/10ML suspension    Vitamin A 3 MG (20185 UT) TABS    vitamin C (ASCORBIC ACID) 250 MG tablet    Vitamin D 12.5  "MCG/0.25ML LIQD    vitamin D3 (CHOLECALCIFEROL) 1.25 MG (19888 UT) capsule    vitamin D3 (CHOLECALCIFEROL) 1.25 MG (11186 UT) capsule    vitamin D3 (CHOLECALCIFEROL) 125 MCG (5000 UT) tablet    vitamin E (TOCOPHEROL) 400 units (180 mg) capsule    Zinc 100 MG TABS    Cobalamin Combinations (B-12) 100-5000 MCG SUBL     No current facility-administered medications for this visit.         8/4/2023     8:45 AM   --   Do you avoid NSAIDs such as (Ibuprofen, Aleve, Naproxen, Advil)? Yes       ROS:  GI:       8/4/2023     8:45 AM   --   Vomiting Yes   Diarrhea Yes   Constipation Yes   Swallowing trouble Yes   Abdominal pain Yes   Heartburn Yes     Skin:       8/4/2023     8:45 AM   BAR RBS ROS - SKIN   Rash in skin folds No     Psych:       8/4/2023     8:45 AM   --   Depression No   Anxiety No     :       8/4/2023     8:45 AM   BAR RBS ROS -    Female only Polycystic ovarian syndrome (PCOS)   Stress urinary incontinence No       LABS/IMAGING/MEDICAL RECORDS REVIEW:     N/a    PHYSICAL EXAMINATION:  Ht 1.753 m (5' 9\")   Wt 65.8 kg (145 lb)   BMI 21.41 kg/m     NAD  Rest of exam deferred due to virtual visit      ASSESSMENT AND PLAN:      1. Several  years status laparoscopic gastric sleeve  2. Morbid Obesity historical current BMI: Body mass index is 21.41 kg/m .  3. Post surgical malabsorption:   Labs ordered per protocol.   Follow food plan per dietitian recommendations.   Continue taking recommended post-op vitamins.  4. Return to clinic after EGD.    Sincerely,    Doyle Roth MD    I spent a total of 30 minutes face to face with Juanis during today's office visit. Over 50% of this time was spent counseling the patient and/or coordinating care.    "

## 2023-09-06 ENCOUNTER — CARE COORDINATION (OUTPATIENT)
Dept: ENDOCRINOLOGY | Facility: CLINIC | Age: 43
End: 2023-09-06
Payer: COMMERCIAL

## 2023-09-08 ENCOUNTER — TELEPHONE (OUTPATIENT)
Dept: GASTROENTEROLOGY | Facility: CLINIC | Age: 43
End: 2023-09-08
Payer: COMMERCIAL

## 2023-09-08 NOTE — TELEPHONE ENCOUNTER
The patient has been trying to reach Dr Roth's team regarding possible surgery arrangements. She has not received a response, and it is important for her to finalize arrangements soon due to her job.  High priority staff message sent to Hector Izaguirre RN.

## 2023-09-28 ENCOUNTER — VIRTUAL VISIT (OUTPATIENT)
Dept: SURGERY | Facility: CLINIC | Age: 43
End: 2023-09-28
Payer: COMMERCIAL

## 2023-09-28 VITALS — WEIGHT: 149 LBS | HEIGHT: 69 IN | BODY MASS INDEX: 22.07 KG/M2

## 2023-09-28 DIAGNOSIS — R10.84 ABDOMINAL PAIN, GENERALIZED: Primary | ICD-10-CM

## 2023-09-28 PROCEDURE — 99214 OFFICE O/P EST MOD 30 MIN: CPT | Mod: VID | Performed by: SURGERY

## 2023-09-28 ASSESSMENT — PAIN SCALES - GENERAL: PAINLEVEL: NO PAIN (0)

## 2023-09-28 NOTE — PROGRESS NOTES
How would you like to obtain your AVS? MyChart  If the video visit is dropped, the invitation should be resent by: Text to cell phone: [phone]  Will anyone else be joining your video visit? No  If patient encounters technical issues they should call [phone]    During this virtual visit the patient is located in [place], patient verifies this as the location during the entirety of this visit.     Video-Visit Details  Video Start Time: 1300    Type of service:  Video Visit    Video End Time:1340    Originating Location (pt. Location): Home    Distant Location (provider location):  On-site    Platform used for Video Visit: ALEXEY Slade 2023      12:30 PM      Return Bariatric Surgery Note    RE: Juanis Sparks  MR#: [id number]  : 1980  VISIT DATE: Sep 28, 2023    Dear Dr. Rios,    I had the pleasure of seeing your patient, Juanis Sparks, in my post-bariatric surgery assessment clinic.    CHIEF COMPLAINT: Post-bariatric surgery follow-up    HISTORY OF PRESENT ILLNESS:  Patient underwent laparoscopic duodenal switch on 3/29/16 after which she experienced recurrent problems with bilious regurgitation. Apparently she thought at that time that she was going to have a sleeve gastrectomy; however, she also knew from her discussions with Dr. Ramos that he was offering a 'new' procedure and it included the sleeve gastrectomy but was actually a loop duodenal switch or DEVON procedure.    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     On 16 patient underwent upper GI endoscopy with biopsies of the stomach due to continued reflux associated pain. Since these procedures Juanis has lost around 150lbs. She endorses intermittent pain when she eats that makes her double over. She is having difficulty eating protein as it is leading to  pain from acid reflux. Patient also endorses having seven or more bowel movements a day. She also has chronic malnourishment despite taking vitamin supplements.          8/4/2023     8:45 AM   Questions Regarding Prior Weight Loss Surgery Reviewed With Patient   I had the following weight loss procedure Mary Ann-en-y Gastric Bypass   What year was your surgery? [id number]   How has your weight changed since your last visit? I have lost weight   Do you currently have any of the following Heartburn, acid reflux, or GERD (acid reflux disease)?   Do you have any concerns today? Just stomach pain     Please note that the patient did not have gastric bypass, which was her answer to questionnaire template above.    Weight History:      8/4/2023     8:45 AM   --   What is your highest lifetime weight? 299   What is your lowest weight since surgery? (In pounds) 138     Weight: 67.6 kg (149 lb) (Pt reported)          8/4/2023     8:45 AM   Questions Regarding Co-Morbidities and Health Concerns Reviewed With Patient   Pre-diabetes Never   Diabetes II Never   High Blood Pressure Never   High cholesterol Never   Heartburn/Reflux Worsened   Sleep apnea Stayed the same   PCOS Never   Back pain Never   Joint pain Never   Lower leg swelling Never           8/4/2023     8:45 AM   Eating Habits   How many meals do you eat per day? 2   Do you snack between meals? Sometimes   How much food are you eating at each meal? 1/2 cup to 1 cup   Are you able to separate your meals and liquids by at least 30 minutes? Sometimes   Are you able to avoid liquid calories? Yes           8/4/2023     8:45 AM   Exercise Questions Reviewed With Patient   How often do you exercise? Daily   What is the duration of your exercise (in minutes)? 45 Minutes   What types of exercise do you do? walking    other   What keeps you from being more active? Too tired       Social History:      8/4/2023     8:45 AM   --   Are you smoking? No   Are you drinking alcohol? No  "      Medications:  Current Outpatient Medications   Medication    Calcium Carbonate-Vitamin D 600-5 MG-MCG TABS    copper gluconate 2 MG tablet    copper gluconate 2 MG tablet    cyanocobalamin (CYANOCOBALAMIN) 1000 MCG/ML injection    Ferrous Sulfate 324 MG TBEC    lipase-protease-amylase (CREON) 57598-97925-948032 units CPEP per EC capsule    omeprazole (PRILOSEC) 40 MG DR capsule    sucralfate (CARAFATE) 1 GM/10ML suspension    Vitamin A 3 MG (36586 UT) TABS    vitamin C (ASCORBIC ACID) 250 MG tablet    Vitamin D 12.5 MCG/0.25ML LIQD    vitamin D3 (CHOLECALCIFEROL) 1.25 MG (26768 UT) capsule    vitamin D3 (CHOLECALCIFEROL) 1.25 MG (97211 UT) capsule    vitamin D3 (CHOLECALCIFEROL) 125 MCG (5000 UT) tablet    vitamin E (TOCOPHEROL) 400 units (180 mg) capsule    Zinc 100 MG TABS    Cobalamin Combinations (B-12) 100-5000 MCG SUBL     No current facility-administered medications for this visit.         8/4/2023     8:45 AM   --   Do you avoid NSAIDs such as (Ibuprofen, Aleve, Naproxen, Advil)? Yes       ROS:  GI:       8/4/2023     8:45 AM   --   Vomiting Yes   Diarrhea Yes   Constipation Yes   Swallowing trouble Yes   Abdominal pain Yes   Heartburn Yes     Skin:       8/4/2023     8:45 AM   BAR RBS ROS - SKIN   Rash in skin folds No     Psych:       8/4/2023     8:45 AM   --   Depression No   Anxiety No     :       8/4/2023     8:45 AM   BAR RBS ROS -    Female only Polycystic ovarian syndrome (PCOS)   Stress urinary incontinence No       LABS/IMAGING/MEDICAL RECORDS REVIEW: previously has had protein malaborption;       PHYSICAL EXAMINATION:  Ht 1.753 m (5' 9\")   Wt 67.6 kg (149 lb)   BMI 22.00 kg/m       ASSESSMENT AND PLAN:    Post surgical malabsorption and reflux following laparoscopic duodenal switch. Discussed risks and benefits of  open revision duodenal switch procedure. Patient verbalized that she understood the risks and benefits of the stated procedure and would like to proceed with this plan of " "action.     Plan: \"Open revision of duodenal switch.\"  2 hours of surgery time.  Anticipate common channel lengthening to improve absorption of vitamins, minerals, and medications, and evaluating for and treating intestinal twists.      1. 7 years status post laparoscopic duodenal switch  2. Post surgical malabsorption:   Labs ordered per protocol.   Follow food plan per dietitian recommendations.   Continue taking recommended post-op vitamins.  4. Return to clinic prn.    Note initially prepared by Jared Norwood MS3    I spent a total of 30 minutes face to face with Juanis during today's office visit. Over 50% of this time was spent counseling the patient and/or coordinating care.Juanis Sparks is a 43 year old who is being evaluated via a billable video visit.      "

## 2023-09-28 NOTE — LETTER
2023       RE: Juanis Sparks  [address]  [place] [address]     Dear Colleague,    Thank you for referring your patient, Juanis Sparks, to the [facility] [place] at [facility]. Please see a copy of my visit note below.    How would you like to obtain your AVS? MyChart  If the video visit is dropped, the invitation should be resent by: Text to cell phone: [phone]  Will anyone else be joining your video visit? No  If patient encounters technical issues they should call [phone]    During this virtual visit the patient is located in [place], patient verifies this as the location during the entirety of this visit.     Return Bariatric Surgery Note    RE: Juanis Sparks  MR#: [id number]  : 1980  VISIT DATE: Sep 28, 2023    Dear Dr. Rios,    I had the pleasure of seeing your patient, Juanis Sparks, in my post-bariatric surgery assessment clinic.    CHIEF COMPLAINT: Post-bariatric surgery follow-up    HISTORY OF PRESENT ILLNESS:  Patient underwent laparoscopic duodenal switch on 3/29/16 after which she experienced recurrent problems with bilious regurgitation. Apparently she thought at that time that she was going to have a sleeve gastrectomy; however, she also knew from her discussions with Dr. Ramos that he was offering a 'new' procedure and it included the sleeve gastrectomy but was actually a loop duodenal switch or DEVON procedure.    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     On 16 patient underwent upper GI endoscopy with biopsies of the stomach due to continued reflux associated pain. Since these procedures Juanis has lost around 150lbs. She endorses intermittent pain when she eats that makes her double over. She is having difficulty eating  protein as it is leading to pain from acid reflux. Patient also endorses having seven or more bowel movements a day. She also has chronic malnourishment despite taking vitamin supplements.          8/4/2023     8:45 AM   Questions Regarding Prior Weight Loss Surgery Reviewed With Patient   I had the following weight loss procedure Mary Ann-en-y Gastric Bypass   What year was your surgery? [id number]   How has your weight changed since your last visit? I have lost weight   Do you currently have any of the following Heartburn, acid reflux, or GERD (acid reflux disease)?   Do you have any concerns today? Just stomach pain     Please note that the patient did not have gastric bypass, which was her answer to questionnaire template above.    Weight History:      8/4/2023     8:45 AM   --   What is your highest lifetime weight? 299   What is your lowest weight since surgery? (In pounds) 138     Weight: 67.6 kg (149 lb) (Pt reported)          8/4/2023     8:45 AM   Questions Regarding Co-Morbidities and Health Concerns Reviewed With Patient   Pre-diabetes Never   Diabetes II Never   High Blood Pressure Never   High cholesterol Never   Heartburn/Reflux Worsened   Sleep apnea Stayed the same   PCOS Never   Back pain Never   Joint pain Never   Lower leg swelling Never           8/4/2023     8:45 AM   Eating Habits   How many meals do you eat per day? 2   Do you snack between meals? Sometimes   How much food are you eating at each meal? 1/2 cup to 1 cup   Are you able to separate your meals and liquids by at least 30 minutes? Sometimes   Are you able to avoid liquid calories? Yes           8/4/2023     8:45 AM   Exercise Questions Reviewed With Patient   How often do you exercise? Daily   What is the duration of your exercise (in minutes)? 45 Minutes   What types of exercise do you do? walking    other   What keeps you from being more active? Too tired       Social History:      8/4/2023     8:45 AM   --   Are you smoking? No   Are  "you drinking alcohol? No       Medications:  Current Outpatient Medications   Medication    Calcium Carbonate-Vitamin D 600-5 MG-MCG TABS    copper gluconate 2 MG tablet    copper gluconate 2 MG tablet    cyanocobalamin (CYANOCOBALAMIN) 1000 MCG/ML injection    Ferrous Sulfate 324 MG TBEC    lipase-protease-amylase (CREON) 91032-14509-987531 units CPEP per EC capsule    omeprazole (PRILOSEC) 40 MG DR capsule    sucralfate (CARAFATE) 1 GM/10ML suspension    Vitamin A 3 MG (63176 UT) TABS    vitamin C (ASCORBIC ACID) 250 MG tablet    Vitamin D 12.5 MCG/0.25ML LIQD    vitamin D3 (CHOLECALCIFEROL) 1.25 MG (31615 UT) capsule    vitamin D3 (CHOLECALCIFEROL) 1.25 MG (37786 UT) capsule    vitamin D3 (CHOLECALCIFEROL) 125 MCG (5000 UT) tablet    vitamin E (TOCOPHEROL) 400 units (180 mg) capsule    Zinc 100 MG TABS    Cobalamin Combinations (B-12) 100-5000 MCG SUBL     No current facility-administered medications for this visit.         8/4/2023     8:45 AM   --   Do you avoid NSAIDs such as (Ibuprofen, Aleve, Naproxen, Advil)? Yes       ROS:  GI:       8/4/2023     8:45 AM   --   Vomiting Yes   Diarrhea Yes   Constipation Yes   Swallowing trouble Yes   Abdominal pain Yes   Heartburn Yes     Skin:       8/4/2023     8:45 AM   BAR RBS ROS - SKIN   Rash in skin folds No     Psych:       8/4/2023     8:45 AM   --   Depression No   Anxiety No     :       8/4/2023     8:45 AM   BAR RBS ROS -    Female only Polycystic ovarian syndrome (PCOS)   Stress urinary incontinence No       LABS/IMAGING/MEDICAL RECORDS REVIEW: previously has had protein malaborption;       PHYSICAL EXAMINATION:  Ht 1.753 m (5' 9\")   Wt 67.6 kg (149 lb)   BMI 22.00 kg/m       ASSESSMENT AND PLAN:    Post surgical malabsorption and reflux following laparoscopic duodenal switch. Discussed risks and benefits of  open revision duodenal switch procedure. Patient verbalized that she understood the risks and benefits of the stated procedure and would like to " "proceed with this plan of action.     Plan: \"Open revision of duodenal switch.\"  2 hours of surgery time.  Anticipate common channel lengthening to improve absorption of vitamins, minerals, and medications, and evaluating for and treating intestinal twists.      1. 7 years status post laparoscopic duodenal switch  2. Post surgical malabsorption:   Labs ordered per protocol.   Follow food plan per dietitian recommendations.   Continue taking recommended post-op vitamins.  4. Return to clinic prn.    Note initially prepared by Jared Norwood MS3    I spent a total of 30 minutes face to face with Juanis during today's office visit. Over 50% of this time was spent counseling the patient and/or coordinating care.Juanis Sparks is a 43 year old who is being evaluated via a billable video visit.          Again, thank you for allowing me to participate in the care of your patient.      Sincerely,    Doyle Roth MD    "

## 2023-09-28 NOTE — NURSING NOTE
"(   Chief Complaint   Patient presents with    RECHECK     Return for surgery plan    )    ( Weight: 67.6 kg (149 lb) (Pt reported) )  ( Height: 175.3 cm (5' 9\") )  ( BMI (Calculated): 22 )  (   )  (   )  (   )  (   )  (   )  (   )    (   )  (   )  (   )  (   )  (   )  (   )  (   )    (   Patient Active Problem List   Diagnosis    S/P bariatric surgery    Intestinal malabsorption, unspecified type    Vitamin deficiency    Hx of gastric ulcer    Gastroesophageal reflux disease with esophagitis, unspecified whether hemorrhage    Mild protein-calorie malnutrition (H)    Epigastric pain    Hx SBO    Hyperparathyroidism, unspecified (H)    Insomnia    Iron deficiency anemia following bariatric surgery    )  (   Current Outpatient Medications   Medication Sig Dispense Refill    Calcium Carbonate-Vitamin D 600-5 MG-MCG TABS Take 1 tablet by mouth      copper gluconate 2 MG tablet Take 1 tablet (2 mg) by mouth daily 90 tablet 1    copper gluconate 2 MG tablet Take 1 tablet (2 mg) by mouth daily 30 tablet 0    cyanocobalamin (CYANOCOBALAMIN) 1000 MCG/ML injection Inject 1,000 mcg into the muscle      Ferrous Sulfate 324 MG TBEC Take 1 tablet by mouth daily      lipase-protease-amylase (CREON) 02977-69306-273922 units CPEP per EC capsule Take 3 capsules by mouth 3 times daily (with meals) 270 capsule 3    omeprazole (PRILOSEC) 40 MG DR capsule Take 1 capsule (40 mg) by mouth daily 90 capsule 3    sucralfate (CARAFATE) 1 GM/10ML suspension Take 10 mLs (1 g) by mouth 4 times daily as needed (epigastric pain, regurgitation) 414 mL 3    Vitamin A 3 MG (81697 UT) TABS Vitamin A. Taking 1000 units PO daily.      vitamin C (ASCORBIC ACID) 250 MG tablet Take 1 tablet (250 mg) by mouth 3 times daily 90 tablet 3    Vitamin D 12.5 MCG/0.25ML LIQD Take 2.5 mLs by mouth daily Goal is 5,000 international unit(s) of water miscible vitamin D 3 daily (125mcg) 240 mL 3    vitamin D3 (CHOLECALCIFEROL) 1.25 MG (79324 UT) capsule Take 1 capsule " (50,000 Units) by mouth daily 90 capsule 0    vitamin D3 (CHOLECALCIFEROL) 1.25 MG (87548 UT) capsule 1 cap by mouth 4 days a week (M/W/F/Saturday)      vitamin D3 (CHOLECALCIFEROL) 125 MCG (5000 UT) tablet Take 5,000 Units by mouth      vitamin E (TOCOPHEROL) 400 units (180 mg) capsule Take 1 capsule (400 Units) by mouth daily 90 capsule 3    Zinc 100 MG TABS Take 1 tablet (50 mg) by mouth daily 90 tablet 1    Cobalamin Combinations (B-12) 100-5000 MCG SUBL Place 5,000 mcg under the tongue (Patient not taking: Reported on 8/4/2023)      )  ( Diabetes Eval:    )    ( Pain Eval:  No Pain (0) )    ( Wound Eval:       )    (   History   Smoking Status    Never   Smokeless Tobacco    Never    )    ( Signed By:  Jimy Connors, EMT; September 28, 2023; 12:47 PM )

## 2023-10-05 ENCOUNTER — PREP FOR PROCEDURE (OUTPATIENT)
Dept: SURGERY | Facility: CLINIC | Age: 43
End: 2023-10-05
Payer: COMMERCIAL

## 2023-10-05 DIAGNOSIS — R10.84 ABDOMINAL PAIN, GENERALIZED: Primary | ICD-10-CM

## 2023-10-05 RX ORDER — ENOXAPARIN SODIUM 100 MG/ML
40 INJECTION SUBCUTANEOUS
Status: CANCELLED | OUTPATIENT
Start: 2023-10-05

## 2023-10-05 RX ORDER — CEFAZOLIN SODIUM 2 G/50ML
2 SOLUTION INTRAVENOUS SEE ADMIN INSTRUCTIONS
Status: CANCELLED | OUTPATIENT
Start: 2023-10-05

## 2023-10-05 RX ORDER — CEFAZOLIN SODIUM 2 G/50ML
2 SOLUTION INTRAVENOUS
Status: CANCELLED | OUTPATIENT
Start: 2023-10-05

## 2023-10-06 NOTE — TELEPHONE ENCOUNTER
FUTURE VISIT INFORMATION      SURGERY INFORMATION:  Date: 10/23/2023   Location: [facility] OR   Surgeon:  Doyle Roth MD   Anesthesia Type:  General  Procedure: LAPAROTOMY, EXPLORATORY, WITH LYSIS OF ADHESIONS; common channel lengthening of prior duodenal switch.       RECORDS REQUESTED FROM:       Primary Care Provider: Charito Rios PA-C    Pertinent Medical History: Insomnia    Most recent EKG+ Tracing: ECG - 2/3/23

## 2023-10-09 ENCOUNTER — VIRTUAL VISIT (OUTPATIENT)
Dept: SURGERY | Facility: CLINIC | Age: 43
End: 2023-10-09
Payer: COMMERCIAL

## 2023-10-09 ENCOUNTER — PRE VISIT (OUTPATIENT)
Dept: SURGERY | Facility: CLINIC | Age: 43
End: 2023-10-09

## 2023-10-09 ENCOUNTER — ANESTHESIA EVENT (OUTPATIENT)
Dept: SURGERY | Facility: CLINIC | Age: 43
DRG: 330 | End: 2023-10-09
Payer: COMMERCIAL

## 2023-10-09 DIAGNOSIS — Z01.818 PRE-OP EVALUATION: Primary | ICD-10-CM

## 2023-10-09 DIAGNOSIS — R10.84 ABDOMINAL PAIN, GENERALIZED: ICD-10-CM

## 2023-10-09 PROBLEM — R10.9 ABDOMINAL PAIN, UNSPECIFIED ABDOMINAL LOCATION: Status: ACTIVE | Noted: 2023-10-09

## 2023-10-09 PROCEDURE — 99203 OFFICE O/P NEW LOW 30 MIN: CPT | Mod: VID | Performed by: NURSE PRACTITIONER

## 2023-10-09 RX ORDER — CALCIUM CARBONATE 500 MG/1
1 TABLET, CHEWABLE ORAL 3 TIMES DAILY PRN
COMMUNITY

## 2023-10-09 RX ORDER — MULTIPLE VITAMINS W/ MINERALS TAB 9MG-400MCG
1 TAB ORAL DAILY
COMMUNITY

## 2023-10-09 NOTE — H&P
Pre-Operative H & P     CC:  Preoperative exam to assess for increased cardiopulmonary risk while undergoing surgery and anesthesia.    Date of Encounter: 10/9/2023  Primary Care Physician:  Charito Rios     Reason for visit:   Encounter Diagnoses   Name Primary?    Pre-op evaluation Yes    Abdominal pain, generalized        HPI  Juanis Sparks is a 43 year old female who presents for pre-operative H & P in preparation for  Procedure Information       Case: [id number] Date/Time: 10/23/23 1335    Procedures:       LAPAROTOMY, EXPLORATORY, WITH LYSIS OF ADHESIONS; (Abdomen)      common channel lengthening of prior duodenal switch. (Abdomen)    Anesthesia type: General    Diagnosis: Abdominal pain, generalized [R10.84]    Pre-op diagnosis: Abdominal pain, generalized [R10.84]    Location:  OR  /  OR    Providers: Doyle Roth MD          The patient was seen by Dr. Roth recently in his post-bariatric surgery assessment clinic for further evaluation of ost surgical malabsorption and reflux following laparoscopic duodenal switch 3/29/16.  Through the evaluation, Dr. Roth counseled the patient of the findings and treatment options. The patient has now been scheduled for the procedure as listed above.      The patient presents to the PAC virtually today in preparation for the above scheduled procedure with comorbid conditions including post surgical malabsorption and reflux following laparoscopic duodenal switch, GERD, irone def anemia, hyperparathryoid, osteoporosis, h/o gastric ulcer and h/o SBO.         History is obtained from the patient and chart review    Hx of abnormal bleeding or anti-platelet use: denies    Menstrual history: No LMP recorded. Patient has had a hysterectomy.:      Past Medical History  Past Medical History:   Diagnosis Date    Anemia     Gastroesophageal reflux disease with esophagitis     Hyperparathyroidism (H24)     Mild protein malnutrition (H24)     Osteoporosis      Postsurgical malabsorption        Past Surgical History  Past Surgical History:   Procedure Laterality Date    ESOPHAGOSCOPY, GASTROSCOPY, DUODENOSCOPY (EGD), COMBINED N/A 06/14/2023    Procedure: ESOPHAGOGASTRODUODENOSCOPY, WITH BIOPSY;  Surgeon: Doyle Roth MD;  Location: [facility]    GASTRECTOMY, SLEEVE, LAPAROSCOPIC, WITH DUODENAL SWITCH N/A 03/29/2016    Dr. Robin Ramos, NARGIS, [place]; loop DS with 300cm efferent; 40Fr sleeve.    FL LAPAROSCOPIC ENTEROENTEROSTOMY, ANASTOMOSIS OF INTESTINE N/A 06/21/2016    bile reflux and stricture of DI; converted to 50cm alimentary (Mary Ann) and 250 common channel       Prior to Admission Medications  Current Outpatient Medications   Medication Sig Dispense Refill    calcium carbonate (TUMS) 500 MG chewable tablet Take 1 chew tab by mouth daily as needed for heartburn      Calcium Carbonate-Vitamin D 600-5 MG-MCG TABS Take 1 tablet by mouth 2 times daily      Cobalamin Combinations (B-12) 100-5000 MCG SUBL Place 5,000 mcg under the tongue every morning      cyanocobalamin (CYANOCOBALAMIN) 1000 MCG/ML injection Inject 1,000 mcg into the muscle every 14 days Next dose 10/15/23      Ferrous Sulfate 324 MG TBEC Take 1 tablet by mouth 2 times daily      lipase-protease-amylase (CREON) 40881-75455-303725 units CPEP per EC capsule Take 3 capsules by mouth 3 times daily (with meals) 270 capsule 3    multivitamin w/minerals (MULTI-VITAMIN) tablet Take 1 tablet by mouth 2 times daily      sucralfate (CARAFATE) 1 GM/10ML suspension Take 10 mLs (1 g) by mouth 4 times daily as needed (epigastric pain, regurgitation) 414 mL 3    Vitamin A 3 MG (06860 UT) TABS every morning      vitamin C (ASCORBIC ACID) 250 MG tablet Take 1 tablet (250 mg) by mouth 3 times daily 90 tablet 3    Vitamin D 12.5 MCG/0.25ML LIQD Take 2.5 mLs by mouth daily Goal is 5,000 international unit(s) of water miscible vitamin D 3 daily (125mcg) (Patient taking differently: Take 2.5 mLs by mouth every morning Goal is  5,000 international unit(s) of water miscible vitamin D 3 daily (125mcg)) 240 mL 3    vitamin D3 (CHOLECALCIFEROL) 1.25 MG (64926 UT) capsule 50,000 Units every morning      vitamin E (TOCOPHEROL) 400 units (180 mg) capsule Take 1 capsule (400 Units) by mouth daily (Patient taking differently: Take 400 Units by mouth every morning) 90 capsule 3    Zinc 100 MG TABS Take 1 tablet (50 mg) by mouth daily (Patient taking differently: Take 1 tablet by mouth daily before breakfast) 90 tablet 1    copper gluconate 2 MG tablet Take 1 tablet (2 mg) by mouth daily (Patient not taking: Reported on 10/9/2023) 90 tablet 1    copper gluconate 2 MG tablet Take 1 tablet (2 mg) by mouth daily (Patient not taking: Reported on 10/9/2023) 30 tablet 0    omeprazole (PRILOSEC) 40 MG DR capsule Take 1 capsule (40 mg) by mouth daily (Patient not taking: Reported on 10/9/2023) 90 capsule 3    vitamin D3 (CHOLECALCIFEROL) 1.25 MG (11908 UT) capsule Take 1 capsule (50,000 Units) by mouth daily (Patient taking differently: Take 50,000 Units by mouth daily) 90 capsule 0    vitamin D3 (CHOLECALCIFEROL) 125 MCG (5000 UT) tablet Take 5,000 Units by mouth         Allergies  Allergies   Allergen Reactions    Amoxicillin Hives and Itching    Tetracycline Hives       Social History  Social History     Socioeconomic History    Marital status:      Spouse name: Not on file    Number of children: Not on file    Years of education: Not on file    Highest education level: Not on file   Occupational History    Not on file   Tobacco Use    Smoking status: Never     Passive exposure: Never    Smokeless tobacco: Never   Substance and Sexual Activity    Alcohol use: Not Currently    Drug use: Not Currently    Sexual activity: Not on file   Other Topics Concern    Not on file   Social History Narrative    Not on file     Social Determinants of Health     Financial Resource Strain: Not on file   Food Insecurity: Not on file   Transportation Needs: Not on  file   Physical Activity: Not on file   Stress: Not on file   Social Connections: Not on file   Interpersonal Safety: Not on file   Housing Stability: Not on file       Family History  History reviewed. No pertinent family history.    Review of Systems  The complete review of systems is negative other than noted in the HPI or here.   Anesthesia Evaluation   Pt has had prior anesthetic. Type: General.    No history of anesthetic complications       ROS/MED HX  ENT/Pulmonary:  - neg pulmonary ROS     Neurologic:  - neg neurologic ROS     Cardiovascular: Comment: Denies cardiac symptoms including chest pain, SOB, palpitations, syncope, LAKHANI, orthopnea, or PND.       (-) taking anticoagulants/antiplatelets   METS/Exercise Tolerance: >4 METS Comment: No dedicated exercise routine but stays active taking care of home and yard.    Hematologic:     (+)      anemia,          Musculoskeletal:  - neg musculoskeletal ROS     GI/Hepatic:     (+) GERD, Symptomatic,        cholecystitis/cholelithiasis (s/p cholecystectomy),          Renal/Genitourinary:  - neg Renal ROS     Endo: Comment: Hyperparathyroid >>following with endocrine.     H/o PCOS    (+)               Obesity (s/p bariatic surgery ~[facility] at [facility]),       Psychiatric/Substance Use:  - neg psychiatric ROS     Infectious Disease:       Malignancy:  - neg malignancy ROS     Other:     Other Significant Disability: s/p hysterectomy ~17 yrs ago..       Virtual visit -  No vitals were obtained    Physical Exam  Constitutional: Awake, alert, cooperative, no apparent distress, and appears stated age.  Eyes: Pupils equal  HENT: Normocephalic, right nare piercing and dentition in poor repair.   Respiratory: non labored breathing   Neurologic: Awake, alert, oriented to name, place and time.   Neuropsychiatric: Calm, cooperative. Normal affect.      Prior Labs/Diagnostic Studies   All labs and imaging personally reviewed     BMP  Specimen: Blood   Ref Range & Units 2 mo ago  Resulting Agency Comments   Sodium 136 - 145 mmol/L 142 [facility]    Potassium 3.5 - 5.1 mmol/L 4.8 [facility]    Chloride 98 - 109 mmol/L 110 High  [facility]    CO2 20 - 29 mmol/L 26 [facility]    Anion Gap 7 - 16 mmol/L 6 Low  [facility]    Calcium 8.4 - 10.4 mg/dL 8.9 [facility]    BUN 7 - 26 mg/dL 18 [facility]    Creatinine 0.55 - 1.02 mg/dL 0.60 [facility]    Glucose 70 - 100 mg/dL 80 [facility] The given reference range is for the fasting state. Non-fasting reference range for glucose is 70 - 180 mg/dL.   Hours Fasting  0 [facility]ORAT[facility]    GFR, Estimated >60 mL/min/1.73m2 >60 [facility]    Specimen Collected: 07/28/23  9:15 AM    Performed by: [facility]      Intact PTH  Specimen: Blood   Ref Range & Units 2 mo ago   Intact PTH 10 - 100 pg/mL 143 High    Resulting [facility]   Specimen Collected: 07/28/23  9:15 AM    Performed by: [facility] abnormal data Complete Blood Count-No Diff  Specimen: Blood   Ref Range & Units 2 mo ago   WBC 3.5 - 10.5 x10(9)/L 5.5   RBC 3.90 - 5.03 x10(12)/L 3.98   Hemoglobin 12.0 - 15.5 g/dL 12.9   HCT 34.9 - 44.5 % 39.2   MCV 80.0 - 100.0 fL 98.5   MCH 27.6 - 33.3 pg 32.4   MCHC 31.5 - 35.2 g/dL 32.9   RDW 11.9 - 15.5 % 11.7 Low    Platelets 150 - 450 x10(9)/L 235   Resulting [facility] LABORATORY   Specimen Collected: 07/28/23  9:15 AM     ntains abnormal data B12  Specimen: Blood   Ref Range & Units 2 mo ago   Vitamin B12 213 - 816 pg/mL 944 High    Resulting [facility]   Specimen Collected: 07/28/23  9:15 AM     PROCEDURES  EKG 2023  Narrative  Performed by [facility]  Sinus bradycardia  Otherwise normal ECG  No previous ECGs available  Confirmed by DERREK KIRAN, NGUYỄN ([id number]),  Hellen Daniel ([id number])  on 2/8/2023  "8:50:08 AM    The patient's records and results personally reviewed by this provider.     Outside records reviewed from: Care Everywhere      Assessment    Juanis Sparks is a 43 year old female seen as a PAC referral for risk assessment and optimization for anesthesia.    Plan/Recommendations  Pt will be optimized for the proposed procedure.  See below for details on the assessment, risk, and preoperative recommendations    NEUROLOGY  - No history of TIA, CVA or seizure    -Post Op delirium risk factors:  No risk identified    ENT  - No current airway concerns.  Will need to be reassessed day of surgery.  Mallampati: Unable to assess  TM: Unable to assess  Reports broken teeth and upper and lower removable partial plates    CARDIAC  - No history of CAD, Hypertension, and Afib.     -  Denies cardiac symptoms.    - METS (Metabolic Equivalents)  Patient performs 4 or more METS exercise without symptoms            Total Score: 0       - RCRI-Very low risk: Class 1 0.4% complication rate            Total Score: 0        PULMONARY  - Obstructive Sleep Apnea  No current risk of obstructive sleep apnea   KAVITHA Low Risk            Total Score: 0      - Denies asthma or inhaler use    - Tobacco History    History   Smoking Status    Never   Smokeless Tobacco    Never       GI  - GERD   ~ Not controlled on medications   ~ reports TUMS is the only medication that brings some relief    - PONV High Risk  Total Score: 3           1 AN PONV: Pt is Female    1 AN PONV: Patient is not a current smoker    1 AN PONV: Intended Post Op Opioids        /RENAL  - Baseline Creatinine  see above     ENDOCRINE    - BMI: Estimated body mass index is 22 kg/m  as calculated from the following:    Height as of 9/28/23: 1.753 m (5' 9\").    Weight as of 9/28/23: 67.6 kg (149 lb).  Healthy Weight (BMI 18.5-24.9)  - s/p bariatric surgery ~7 years ago at [facility] now with postsurgical malabsorption, mild malnutrition and chronic abdominal pain   ~ " prior to bariatric surgery weighed 299 lbs.  Now fluctuates 135-140 lbs.   ~ above procedure scheduled   ~ preoperative labs DOS.     - Hyperparathyroid in setting of postsurgical malabsorption    - Osteoporosis in setting of postsurgical malabsorption    HEME  - VTE Low Risk 0.26%            Total Score: 0      - No history of abnormal bleeding or antiplatelet use.    - Anemia in postsurgical malabsorption   ~ iron deficient and B12 deficient   ~ on replacement       Different anesthesia methods/types have been discussed with the patient, but they are aware that the final plan will be decided by the assigned anesthesia provider on the date of service.      The patient is optimized for their procedure. AVS with information on surgery time/arrival time, meds and NPO status given by nursing staff. No further diagnostic testing indicated.    Please refer to the physical examination documented by the anesthesiologist in the anesthesia record on the day of surgery.    Video-Visit Details    Type of service:  Video Visit    Provider received verbal consent for a Video Visit from the patient? Yes     Originating Location (pt. Location): Home    Distant Location (provider location):  Off-site  Mode of Communication:  Video Conference via [employer]  On the day of service:     Prep time: 15 minutes  Visit time: 15 minutes  Documentation time: 10 minutes  ------------------------------------------  Total time: 40 minutes      GAVINO Sarmiento CNP  Preoperative Assessment Center  [facility]  Clinic and Surgery Center  Phone: [phone]  Fax: [phone]

## 2023-10-09 NOTE — PATIENT INSTRUCTIONS
Preparing for Your Surgery      Name:  Juanis Sparsk   MRN:  [id number]   :  1980   Today's Date:  10/9/2023       Arriving for surgery:  Surgery date:  10/23/23  Arrival time:  11:35AM    Please come to:     Please come to:      [facility] [address]  [address] [place]  [address]      The [facility] Patient /Visitor Ramp is located at [address]. Patients and visitors who self-park will receive the reduced hospital parking rate. If the Patient /Visitor Ramp is full, please follow the signs to the  parking located at the main hospital entrance.     parking is available ( 24 hours/ 7 days a week)    Discounted parking pass options are available for patients and visitors. They can be purchased at the [place] desk at the main hospital entrance.    -    Stop at the security desk and they will direct surgery patients to the 3rd floor Surgery Waiting Room. [phone]     -  If you are in need of directions, wheelchair or escort please stop at the Information/security desk in the lobby.       What can I eat or drink?  -  Begin Clear Liquid Diet on 10/22/23 through Midnight.  -  You may have sips of water until 2 hours prior to arrival time. (Until 10/23/23, 9:35AM)    Examples of clear liquids:  Water  Clear broth  Juices (apple, white grape, white cranberry  and cider) without pulp  Noncarbonated, powder based beverages  (lemonade and Attila-Aid)  Sodas (Sprite, 7-Up, ginger ale and seltzer)  Coffee or tea (without milk or cream)  Gatorade    -  No Alcohol or cannabis products for at least 24 hours before surgery.     Which medicines can I take?    Hold Aspirin for 7 days before surgery.   Hold Multivitamins for 7 days before surgery.  Hold Supplements for 7 days before surgery.  Hold Ibuprofen (Advil, Motrin) for 1 day(s) before surgery--unless otherwise directed by surgeon.  Hold Naproxen (Aleve) for 4 days  before surgery.    -  DO NOT take these medications the day of surgery:    Carafate    Tums        -  PLEASE TAKE these medications the day of surgery:    Omeprazole as needed    How do I prepare myself?  - Please take 2 showers (one the night prior to surgery and one the morning of surgery) using Scrubcare or Hibiclens soap.    Use this soap only from the neck to your toes.     Leave the soap on your skin for one minute--then rinse thoroughly.      You may use your own shampoo and conditioner. No other hair products.   - Please remove all jewelry and body piercings.  - No lotions, deodorants or fragrance.  - No makeup or fingernail polish.   - Bring your ID and insurance card.    -If you have a Deep Brain Stimulator, Spinal Cord Stimulator, or any Neuro Stimulator device---you must bring the remote control to the hospital.      ALL PATIENTS GOING HOME THE SAME DAY OF SURGERY ARE REQUIRED TO HAVE A RESPONSIBLE ADULT TO DRIVE AND BE IN ATTENDANCE WITH THEM FOR 24 HOURS FOLLOWING SURGERY.    Covid testing policy as of 12/06/2022  Your surgeon will notify and schedule you for a COVID test if one is needed before surgery--please direct any questions or COVID symptoms to your surgeon      Questions or Concerns:    - For any questions regarding the day of surgery or your hospital stay, please contact the Pre Admission Nursing Office at [phone].       - If you have health changes between today and your surgery, please call your surgeon.       - For questions after surgery, please call your surgeons office.           Current Visitor Guidelines    You may have 2 visitors in the pre op area.    Visiting hours: 8 a.m. to 8:30 p.m.    You may have four visitors during your inpatient hospital stay.    Patients confirmed or suspected to have symptoms of COVID 19 or flu:     No visitors allowed for adult patients.   Children (under age 18) can have 1 named visitor.     People who are sick or showing symptoms of COVID 19 or  flu:    Are not allowed to visit patients--we can only make exceptions in special situations.       Please follow these guidelines for your visit:          Please maintain social distance          Masking is optional--however at times you may be asked to wear a mask for the safety of yourself and others     Clean your hands with alcohol hand . Do this when you arrive at and leave the building and patient room,    And again after you touch your mask or anything in the room.     Go directly to and from the room you are visiting.     Stay in the patient s room during your visit. Limit going to other places in the hospital as much as possible     Leave bags and jackets at home or in the car.     For everyone s health, please don t come and go during your visit. That includes for smoking   during your visit.

## 2023-10-09 NOTE — PROGRESS NOTES
Juanis is a 43 year old who is being evaluated via a billable video visit.      How would you like to obtain your AVS? Lolaharsaumya      Subjective   Juanis is a 43 year old, presenting for the following health issues:  Pre-Op Exam (/)    HPI           Review of Systems       Physical Exam     SULY Hernandez LPN

## 2023-10-22 NOTE — ANESTHESIA PREPROCEDURE EVALUATION
Anesthesia Pre-Procedure Evaluation    Patient: Juanis Sparks   MRN: [id number] : 1980        Procedure : Procedure(s):  LAPAROTOMY, EXPLORATORY, WITH LYSIS OF ADHESIONS  common channel lengthening of prior duodenal switch.          Past Medical History:   Diagnosis Date    Anemia     Gastroesophageal reflux disease with esophagitis     Hyperparathyroidism (H24)     Mild protein malnutrition (H24)     Osteoporosis     Postsurgical malabsorption       Past Surgical History:   Procedure Laterality Date    ESOPHAGOSCOPY, GASTROSCOPY, DUODENOSCOPY (EGD), COMBINED N/A 2023    Procedure: ESOPHAGOGASTRODUODENOSCOPY, WITH BIOPSY;  Surgeon: Doyle Roth MD;  Location: [facility]    GASTRECTOMY, SLEEVE, LAPAROSCOPIC, WITH DUODENAL SWITCH N/A 2016    Dr. Robin Ramos, Ethel BARILLAS; loop DS with 300cm efferent; 40Fr sleeve.    VT LAPAROSCOPIC ENTEROENTEROSTOMY, ANASTOMOSIS OF INTESTINE N/A 2016    bile reflux and stricture of DI; converted to 50cm alimentary (Mary Ann) and 250 common channel      Allergies   Allergen Reactions    Amoxicillin Hives and Itching    Tetracycline Hives      Social History     Tobacco Use    Smoking status: Never     Passive exposure: Never    Smokeless tobacco: Never   Substance Use Topics    Alcohol use: Not Currently      Wt Readings from Last 1 Encounters:   23 67.6 kg (149 lb)        Anesthesia Evaluation    Type: General.        ROS/MED HX  ENT/Pulmonary:  - neg pulmonary ROS     Neurologic:  - neg neurologic ROS     Cardiovascular: Comment: Denies cardiac symptoms including chest pain, SOB, palpitations, syncope, LAKHANI, orthopnea, or PND.     - neg cardiovascular ROS  (-) taking anticoagulants/antiplatelets   METS/Exercise Tolerance: >4 METS Comment: No dedicated exercise routine but stays active taking care of home and yard.    Hematologic:     (+)      anemia (iron def),          Musculoskeletal: Comment: Osteoporosis  - neg musculoskeletal ROS     GI/Hepatic:  "Comment: Hx of gastric sleeve  Hx of gastric ulcers  Mild malnutrition    (+) GERD, Symptomatic,        cholecystitis/cholelithiasis (s/p cholecystectomy),          Renal/Genitourinary:  - neg Renal ROS     Endo: Comment: Hyperparathyroidism    (+)               Obesity (s/p bariatic surgery ~2015 at [facility]),       Psychiatric/Substance Use:  - neg psychiatric ROS     Infectious Disease:  - neg infectious disease ROS     Malignancy:  - neg malignancy ROS     Other:     Other Significant Disability: s/p hysterectomy ~17 yrs ago..          OUTSIDE LABS:  CBC: No results found for: \"WBC\", \"HGB\", \"HCT\", \"PLT\"  BMP:   Lab Results   Component Value Date    CHLORIDE 110 (H) 06/08/2023     COAGS: No results found for: \"PTT\", \"INR\", \"FIBR\"  POC: No results found for: \"BGM\", \"HCG\", \"HCGS\"  HEPATIC: No results found for: \"ALBUMIN\", \"PROTTOTAL\", \"ALT\", \"AST\", \"GGT\", \"ALKPHOS\", \"BILITOTAL\", \"BILIDIRECT\", \"KARSON\"  OTHER: No results found for: \"PH\", \"LACT\", \"A1C\", \"SHIRLEY\", \"PHOS\", \"MAG\", \"LIPASE\", \"AMYLASE\", \"TSH\", \"T4\", \"T3\", \"CRP\", \"SED\"    Anesthesia Plan    ASA Status:  3    NPO Status:  NPO Appropriate    Anesthesia Type: General.     - Airway: ETT   Induction: Intravenous.   Maintenance: Balanced.   Techniques and Equipment:     - Lines/Monitors: BIS, 2nd IV     Consents    Anesthesia Plan(s) and associated risks, benefits, and realistic alternatives discussed. Questions answered and patient/representative(s) expressed understanding.     - Discussed: Risks, Benefits and Alternatives for BOTH SEDATION and the PROCEDURE were discussed     - Discussed with:  Patient            Postoperative Care    Pain management: IV analgesics, Oral pain medications, Multi-modal analgesia.   PONV prophylaxis: Ondansetron (or other 5HT-3), Dexamethasone or Solumedrol     Comments:                Rashad Mayers MD  "

## 2023-10-23 ENCOUNTER — ANESTHESIA (OUTPATIENT)
Dept: SURGERY | Facility: CLINIC | Age: 43
DRG: 330 | End: 2023-10-23
Payer: COMMERCIAL

## 2023-10-23 ENCOUNTER — HOSPITAL ENCOUNTER (INPATIENT)
Facility: CLINIC | Age: 43
LOS: 2 days | Discharge: HOME | DRG: 330 | End: 2023-10-25
Attending: SURGERY | Admitting: SURGERY
Payer: COMMERCIAL

## 2023-10-23 DIAGNOSIS — Z98.84 S/P GASTRIC BYPASS: Primary | ICD-10-CM

## 2023-10-23 DIAGNOSIS — Z98.84 S/P BARIATRIC SURGERY: ICD-10-CM

## 2023-10-23 DIAGNOSIS — R10.9 ABDOMINAL PAIN, UNSPECIFIED ABDOMINAL LOCATION: ICD-10-CM

## 2023-10-23 LAB
ABO/RH(D): NORMAL
ANION GAP SERPL CALCULATED.3IONS-SCNC: 9 MMOL/L (ref 7–15)
ANTIBODY SCREEN: NEGATIVE
BUN SERPL-MCNC: 9.8 MG/DL (ref 6–20)
CALCIUM SERPL-MCNC: 8.3 MG/DL (ref 8.6–10)
CHLORIDE SERPL-SCNC: 106 MMOL/L (ref 98–107)
CREAT SERPL-MCNC: 0.51 MG/DL (ref 0.51–0.95)
CREAT SERPL-MCNC: 0.54 MG/DL (ref 0.51–0.95)
DEPRECATED HCO3 PLAS-SCNC: 25 MMOL/L (ref 22–29)
EGFRCR SERPLBLD CKD-EPI 2021: >90 ML/MIN/1.73M2
EGFRCR SERPLBLD CKD-EPI 2021: >90 ML/MIN/1.73M2
ERYTHROCYTE [DISTWIDTH] IN BLOOD BY AUTOMATED COUNT: 11.8 % (ref 10–15)
GLUCOSE BLDC GLUCOMTR-MCNC: 79 MG/DL (ref 70–99)
GLUCOSE SERPL-MCNC: 83 MG/DL (ref 70–99)
HCT VFR BLD AUTO: 36.2 % (ref 35–47)
HGB BLD-MCNC: 12 G/DL (ref 11.7–15.7)
MCH RBC QN AUTO: 33.1 PG (ref 26.5–33)
MCHC RBC AUTO-ENTMCNC: 33.1 G/DL (ref 31.5–36.5)
MCV RBC AUTO: 100 FL (ref 78–100)
PLATELET # BLD AUTO: 219 10E3/UL (ref 150–450)
POTASSIUM SERPL-SCNC: 3.6 MMOL/L (ref 3.4–5.3)
RBC # BLD AUTO: 3.62 10E6/UL (ref 3.8–5.2)
SODIUM SERPL-SCNC: 140 MMOL/L (ref 135–145)
SPECIMEN EXPIRATION DATE: NORMAL
WBC # BLD AUTO: 4.3 10E3/UL (ref 4–11)

## 2023-10-23 PROCEDURE — 0DNU0ZZ RELEASE OMENTUM, OPEN APPROACH: ICD-10-PCS | Performed by: SURGERY

## 2023-10-23 PROCEDURE — 258N000003 HC RX IP 258 OP 636: Performed by: ANESTHESIOLOGY

## 2023-10-23 PROCEDURE — 250N000009 HC RX 250: Performed by: STUDENT IN AN ORGANIZED HEALTH CARE EDUCATION/TRAINING PROGRAM

## 2023-10-23 PROCEDURE — 272N000001 HC OR GENERAL SUPPLY STERILE: Performed by: SURGERY

## 2023-10-23 PROCEDURE — 370N000017 HC ANESTHESIA TECHNICAL FEE, PER MIN: Performed by: SURGERY

## 2023-10-23 PROCEDURE — 250N000011 HC RX IP 250 OP 636: Performed by: SURGERY

## 2023-10-23 PROCEDURE — 250N000011 HC RX IP 250 OP 636

## 2023-10-23 PROCEDURE — 82310 ASSAY OF CALCIUM: CPT | Performed by: NURSE PRACTITIONER

## 2023-10-23 PROCEDURE — 250N000011 HC RX IP 250 OP 636: Mod: JZ | Performed by: STUDENT IN AN ORGANIZED HEALTH CARE EDUCATION/TRAINING PROGRAM

## 2023-10-23 PROCEDURE — 258N000003 HC RX IP 258 OP 636: Performed by: NURSE ANESTHETIST, CERTIFIED REGISTERED

## 2023-10-23 PROCEDURE — 36415 COLL VENOUS BLD VENIPUNCTURE: CPT | Performed by: STUDENT IN AN ORGANIZED HEALTH CARE EDUCATION/TRAINING PROGRAM

## 2023-10-23 PROCEDURE — 82565 ASSAY OF CREATININE: CPT | Performed by: STUDENT IN AN ORGANIZED HEALTH CARE EDUCATION/TRAINING PROGRAM

## 2023-10-23 PROCEDURE — 88304 TISSUE EXAM BY PATHOLOGIST: CPT | Mod: 26 | Performed by: PATHOLOGY

## 2023-10-23 PROCEDURE — 250N000011 HC RX IP 250 OP 636: Mod: JZ

## 2023-10-23 PROCEDURE — 85027 COMPLETE CBC AUTOMATED: CPT | Performed by: NURSE PRACTITIONER

## 2023-10-23 PROCEDURE — 271N000001 HC OR GENERAL SUPPLY NON-STERILE: Performed by: SURGERY

## 2023-10-23 PROCEDURE — 250N000013 HC RX MED GY IP 250 OP 250 PS 637

## 2023-10-23 PROCEDURE — 250N000011 HC RX IP 250 OP 636: Mod: JZ | Performed by: NURSE ANESTHETIST, CERTIFIED REGISTERED

## 2023-10-23 PROCEDURE — 120N000002 HC R&B MED SURG/OB UMMC

## 2023-10-23 PROCEDURE — 44120 REMOVAL OF SMALL INTESTINE: CPT | Mod: GC | Performed by: SURGERY

## 2023-10-23 PROCEDURE — 250N000011 HC RX IP 250 OP 636: Performed by: NURSE ANESTHETIST, CERTIFIED REGISTERED

## 2023-10-23 PROCEDURE — 999N000141 HC STATISTIC PRE-PROCEDURE NURSING ASSESSMENT: Performed by: SURGERY

## 2023-10-23 PROCEDURE — 258N000003 HC RX IP 258 OP 636: Performed by: STUDENT IN AN ORGANIZED HEALTH CARE EDUCATION/TRAINING PROGRAM

## 2023-10-23 PROCEDURE — 88304 TISSUE EXAM BY PATHOLOGIST: CPT | Mod: TC | Performed by: SURGERY

## 2023-10-23 PROCEDURE — 36415 COLL VENOUS BLD VENIPUNCTURE: CPT | Performed by: NURSE PRACTITIONER

## 2023-10-23 PROCEDURE — 360N000077 HC SURGERY LEVEL 4, PER MIN: Performed by: SURGERY

## 2023-10-23 PROCEDURE — 86901 BLOOD TYPING SEROLOGIC RH(D): CPT | Performed by: NURSE PRACTITIONER

## 2023-10-23 PROCEDURE — 0DQ80ZZ REPAIR SMALL INTESTINE, OPEN APPROACH: ICD-10-PCS | Performed by: SURGERY

## 2023-10-23 PROCEDURE — 250N000011 HC RX IP 250 OP 636: Performed by: ANESTHESIOLOGY

## 2023-10-23 PROCEDURE — 250N000013 HC RX MED GY IP 250 OP 250 PS 637: Performed by: STUDENT IN AN ORGANIZED HEALTH CARE EDUCATION/TRAINING PROGRAM

## 2023-10-23 PROCEDURE — 250N000009 HC RX 250: Performed by: NURSE ANESTHETIST, CERTIFIED REGISTERED

## 2023-10-23 PROCEDURE — 250N000009 HC RX 250: Performed by: ANESTHESIOLOGY

## 2023-10-23 PROCEDURE — 250N000025 HC SEVOFLURANE, PER MIN: Performed by: SURGERY

## 2023-10-23 PROCEDURE — 710N000010 HC RECOVERY PHASE 1, LEVEL 2, PER MIN: Performed by: SURGERY

## 2023-10-23 PROCEDURE — 250N000011 HC RX IP 250 OP 636: Mod: JZ | Performed by: SURGERY

## 2023-10-23 PROCEDURE — C9290 INJ, BUPIVACAINE LIPOSOME: HCPCS | Performed by: STUDENT IN AN ORGANIZED HEALTH CARE EDUCATION/TRAINING PROGRAM

## 2023-10-23 RX ORDER — DIPHENHYDRAMINE HYDROCHLORIDE 50 MG/ML
25 INJECTION INTRAMUSCULAR; INTRAVENOUS EVERY 6 HOURS PRN
Status: DISCONTINUED | OUTPATIENT
Start: 2023-10-23 | End: 2023-10-25 | Stop reason: HOSPADM

## 2023-10-23 RX ORDER — OXYCODONE HYDROCHLORIDE 5 MG/1
5 TABLET ORAL
Status: DISCONTINUED | OUTPATIENT
Start: 2023-10-23 | End: 2023-10-25 | Stop reason: HOSPADM

## 2023-10-23 RX ORDER — PROPOFOL 10 MG/ML
INJECTION, EMULSION INTRAVENOUS PRN
Status: DISCONTINUED | OUTPATIENT
Start: 2023-10-23 | End: 2023-10-23

## 2023-10-23 RX ORDER — CEFAZOLIN SODIUM/WATER 2 G/20 ML
2 SYRINGE (ML) INTRAVENOUS
Status: COMPLETED | OUTPATIENT
Start: 2023-10-23 | End: 2023-10-23

## 2023-10-23 RX ORDER — LIDOCAINE HYDROCHLORIDE 20 MG/ML
INJECTION, SOLUTION INFILTRATION; PERINEURAL PRN
Status: DISCONTINUED | OUTPATIENT
Start: 2023-10-23 | End: 2023-10-23

## 2023-10-23 RX ORDER — NALOXONE HYDROCHLORIDE 0.4 MG/ML
0.2 INJECTION, SOLUTION INTRAMUSCULAR; INTRAVENOUS; SUBCUTANEOUS
Status: DISCONTINUED | OUTPATIENT
Start: 2023-10-23 | End: 2023-10-25 | Stop reason: HOSPADM

## 2023-10-23 RX ORDER — CHOLECALCIFEROL (VITAMIN D3) 1250 MCG
50000 CAPSULE ORAL EVERY MORNING
Status: DISCONTINUED | OUTPATIENT
Start: 2023-10-24 | End: 2023-10-25 | Stop reason: HOSPADM

## 2023-10-23 RX ORDER — CEFAZOLIN SODIUM/WATER 2 G/20 ML
2 SYRINGE (ML) INTRAVENOUS SEE ADMIN INSTRUCTIONS
Status: DISCONTINUED | OUTPATIENT
Start: 2023-10-23 | End: 2023-10-23 | Stop reason: HOSPADM

## 2023-10-23 RX ORDER — ENOXAPARIN SODIUM 100 MG/ML
40 INJECTION SUBCUTANEOUS
Status: COMPLETED | OUTPATIENT
Start: 2023-10-23 | End: 2023-10-23

## 2023-10-23 RX ORDER — ONDANSETRON 2 MG/ML
4 INJECTION INTRAMUSCULAR; INTRAVENOUS EVERY 6 HOURS PRN
Status: DISCONTINUED | OUTPATIENT
Start: 2023-10-23 | End: 2023-10-25 | Stop reason: HOSPADM

## 2023-10-23 RX ORDER — LIDOCAINE 40 MG/G
CREAM TOPICAL
Status: DISCONTINUED | OUTPATIENT
Start: 2023-10-23 | End: 2023-10-23 | Stop reason: HOSPADM

## 2023-10-23 RX ORDER — ENOXAPARIN SODIUM 100 MG/ML
40 INJECTION SUBCUTANEOUS EVERY 24 HOURS
Status: DISCONTINUED | OUTPATIENT
Start: 2023-10-24 | End: 2023-10-25 | Stop reason: HOSPADM

## 2023-10-23 RX ORDER — ONDANSETRON 2 MG/ML
4 INJECTION INTRAMUSCULAR; INTRAVENOUS EVERY 30 MIN PRN
Status: DISCONTINUED | OUTPATIENT
Start: 2023-10-23 | End: 2023-10-23 | Stop reason: HOSPADM

## 2023-10-23 RX ORDER — SODIUM CHLORIDE, SODIUM LACTATE, POTASSIUM CHLORIDE, CALCIUM CHLORIDE 600; 310; 30; 20 MG/100ML; MG/100ML; MG/100ML; MG/100ML
INJECTION, SOLUTION INTRAVENOUS CONTINUOUS PRN
Status: DISCONTINUED | OUTPATIENT
Start: 2023-10-23 | End: 2023-10-23

## 2023-10-23 RX ORDER — DEXAMETHASONE SODIUM PHOSPHATE 4 MG/ML
INJECTION, SOLUTION INTRA-ARTICULAR; INTRALESIONAL; INTRAMUSCULAR; INTRAVENOUS; SOFT TISSUE PRN
Status: DISCONTINUED | OUTPATIENT
Start: 2023-10-23 | End: 2023-10-23

## 2023-10-23 RX ORDER — ONDANSETRON 4 MG/1
4 TABLET, ORALLY DISINTEGRATING ORAL EVERY 30 MIN PRN
Status: DISCONTINUED | OUTPATIENT
Start: 2023-10-23 | End: 2023-10-23 | Stop reason: HOSPADM

## 2023-10-23 RX ORDER — FLUMAZENIL 0.1 MG/ML
0.2 INJECTION, SOLUTION INTRAVENOUS
Status: DISCONTINUED | OUTPATIENT
Start: 2023-10-23 | End: 2023-10-23 | Stop reason: HOSPADM

## 2023-10-23 RX ORDER — ACETAMINOPHEN 325 MG/1
975 TABLET ORAL ONCE
Status: COMPLETED | OUTPATIENT
Start: 2023-10-23 | End: 2023-10-23

## 2023-10-23 RX ORDER — BUPIVACAINE HYDROCHLORIDE 2.5 MG/ML
INJECTION, SOLUTION EPIDURAL; INFILTRATION; INTRACAUDAL
Status: COMPLETED | OUTPATIENT
Start: 2023-10-23 | End: 2023-10-23

## 2023-10-23 RX ORDER — FENTANYL CITRATE 50 UG/ML
25 INJECTION, SOLUTION INTRAMUSCULAR; INTRAVENOUS EVERY 5 MIN PRN
Status: DISCONTINUED | OUTPATIENT
Start: 2023-10-23 | End: 2023-10-23 | Stop reason: HOSPADM

## 2023-10-23 RX ORDER — GLYCOPYRROLATE 0.2 MG/ML
INJECTION, SOLUTION INTRAMUSCULAR; INTRAVENOUS PRN
Status: DISCONTINUED | OUTPATIENT
Start: 2023-10-23 | End: 2023-10-23

## 2023-10-23 RX ORDER — SODIUM CHLORIDE, SODIUM LACTATE, POTASSIUM CHLORIDE, CALCIUM CHLORIDE 600; 310; 30; 20 MG/100ML; MG/100ML; MG/100ML; MG/100ML
INJECTION, SOLUTION INTRAVENOUS CONTINUOUS
Status: DISCONTINUED | OUTPATIENT
Start: 2023-10-23 | End: 2023-10-23 | Stop reason: HOSPADM

## 2023-10-23 RX ORDER — NALOXONE HYDROCHLORIDE 0.4 MG/ML
0.2 INJECTION, SOLUTION INTRAMUSCULAR; INTRAVENOUS; SUBCUTANEOUS
Status: DISCONTINUED | OUTPATIENT
Start: 2023-10-23 | End: 2023-10-23 | Stop reason: HOSPADM

## 2023-10-23 RX ORDER — MULTIVIT WITH MINERALS/LUTEIN
250 TABLET ORAL 3 TIMES DAILY
Status: DISCONTINUED | OUTPATIENT
Start: 2023-10-23 | End: 2023-10-25 | Stop reason: HOSPADM

## 2023-10-23 RX ORDER — DIPHENHYDRAMINE HCL 25 MG
25 CAPSULE ORAL EVERY 6 HOURS PRN
Status: DISCONTINUED | OUTPATIENT
Start: 2023-10-23 | End: 2023-10-25 | Stop reason: HOSPADM

## 2023-10-23 RX ORDER — SCOLOPAMINE TRANSDERMAL SYSTEM 1 MG/1
1 PATCH, EXTENDED RELEASE TRANSDERMAL
Status: DISCONTINUED | OUTPATIENT
Start: 2023-10-23 | End: 2023-10-25 | Stop reason: HOSPADM

## 2023-10-23 RX ORDER — ONDANSETRON 2 MG/ML
INJECTION INTRAMUSCULAR; INTRAVENOUS PRN
Status: DISCONTINUED | OUTPATIENT
Start: 2023-10-23 | End: 2023-10-23

## 2023-10-23 RX ORDER — NALOXONE HYDROCHLORIDE 0.4 MG/ML
0.4 INJECTION, SOLUTION INTRAMUSCULAR; INTRAVENOUS; SUBCUTANEOUS
Status: DISCONTINUED | OUTPATIENT
Start: 2023-10-23 | End: 2023-10-25 | Stop reason: HOSPADM

## 2023-10-23 RX ORDER — FENTANYL CITRATE 50 UG/ML
25-50 INJECTION, SOLUTION INTRAMUSCULAR; INTRAVENOUS
Status: DISCONTINUED | OUTPATIENT
Start: 2023-10-23 | End: 2023-10-23 | Stop reason: HOSPADM

## 2023-10-23 RX ORDER — NALOXONE HYDROCHLORIDE 0.4 MG/ML
0.4 INJECTION, SOLUTION INTRAMUSCULAR; INTRAVENOUS; SUBCUTANEOUS
Status: DISCONTINUED | OUTPATIENT
Start: 2023-10-23 | End: 2023-10-23 | Stop reason: HOSPADM

## 2023-10-23 RX ORDER — FENTANYL CITRATE 50 UG/ML
50 INJECTION, SOLUTION INTRAMUSCULAR; INTRAVENOUS EVERY 5 MIN PRN
Status: DISCONTINUED | OUTPATIENT
Start: 2023-10-23 | End: 2023-10-23 | Stop reason: HOSPADM

## 2023-10-23 RX ORDER — ONDANSETRON 4 MG/1
4 TABLET, ORALLY DISINTEGRATING ORAL EVERY 6 HOURS PRN
Status: DISCONTINUED | OUTPATIENT
Start: 2023-10-23 | End: 2023-10-25 | Stop reason: HOSPADM

## 2023-10-23 RX ORDER — MULTIPLE VITAMINS W/ MINERALS TAB 9MG-400MCG
1 TAB ORAL 2 TIMES DAILY
Status: DISCONTINUED | OUTPATIENT
Start: 2023-10-23 | End: 2023-10-25 | Stop reason: HOSPADM

## 2023-10-23 RX ORDER — LABETALOL HYDROCHLORIDE 5 MG/ML
5-10 INJECTION, SOLUTION INTRAVENOUS
Status: DISCONTINUED | OUTPATIENT
Start: 2023-10-23 | End: 2023-10-25 | Stop reason: HOSPADM

## 2023-10-23 RX ORDER — PROCHLORPERAZINE MALEATE 5 MG
10 TABLET ORAL EVERY 6 HOURS PRN
Status: DISCONTINUED | OUTPATIENT
Start: 2023-10-23 | End: 2023-10-25 | Stop reason: HOSPADM

## 2023-10-23 RX ORDER — ACETAMINOPHEN 325 MG/1
650 TABLET ORAL EVERY 4 HOURS PRN
Status: DISCONTINUED | OUTPATIENT
Start: 2023-10-23 | End: 2023-10-25 | Stop reason: HOSPADM

## 2023-10-23 RX ORDER — HYDROMORPHONE HCL IN WATER/PF 6 MG/30 ML
0.2 PATIENT CONTROLLED ANALGESIA SYRINGE INTRAVENOUS
Status: DISCONTINUED | OUTPATIENT
Start: 2023-10-23 | End: 2023-10-25 | Stop reason: HOSPADM

## 2023-10-23 RX ORDER — OXYCODONE HYDROCHLORIDE 10 MG/1
10 TABLET ORAL
Status: DISCONTINUED | OUTPATIENT
Start: 2023-10-23 | End: 2023-10-25 | Stop reason: HOSPADM

## 2023-10-23 RX ORDER — FERROUS SULFATE 325(65) MG
325 TABLET ORAL 2 TIMES DAILY
Status: DISCONTINUED | OUTPATIENT
Start: 2023-10-23 | End: 2023-10-25 | Stop reason: HOSPADM

## 2023-10-23 RX ORDER — HYDROMORPHONE HCL IN WATER/PF 6 MG/30 ML
0.4 PATIENT CONTROLLED ANALGESIA SYRINGE INTRAVENOUS EVERY 5 MIN PRN
Status: DISCONTINUED | OUTPATIENT
Start: 2023-10-23 | End: 2023-10-23 | Stop reason: HOSPADM

## 2023-10-23 RX ORDER — HYDROMORPHONE HCL IN WATER/PF 6 MG/30 ML
0.2 PATIENT CONTROLLED ANALGESIA SYRINGE INTRAVENOUS EVERY 5 MIN PRN
Status: DISCONTINUED | OUTPATIENT
Start: 2023-10-23 | End: 2023-10-23 | Stop reason: HOSPADM

## 2023-10-23 RX ORDER — FENTANYL CITRATE 50 UG/ML
INJECTION, SOLUTION INTRAMUSCULAR; INTRAVENOUS PRN
Status: DISCONTINUED | OUTPATIENT
Start: 2023-10-23 | End: 2023-10-23

## 2023-10-23 RX ORDER — CYCLOBENZAPRINE HCL 10 MG
10 TABLET ORAL ONCE
Status: COMPLETED | OUTPATIENT
Start: 2023-10-23 | End: 2023-10-23

## 2023-10-23 RX ORDER — SODIUM CHLORIDE, SODIUM LACTATE, POTASSIUM CHLORIDE, CALCIUM CHLORIDE 600; 310; 30; 20 MG/100ML; MG/100ML; MG/100ML; MG/100ML
INJECTION, SOLUTION INTRAVENOUS CONTINUOUS
Status: DISCONTINUED | OUTPATIENT
Start: 2023-10-23 | End: 2023-10-25 | Stop reason: HOSPADM

## 2023-10-23 RX ORDER — LIDOCAINE 40 MG/G
CREAM TOPICAL
Status: DISCONTINUED | OUTPATIENT
Start: 2023-10-23 | End: 2023-10-25 | Stop reason: HOSPADM

## 2023-10-23 RX ADMIN — DEXMEDETOMIDINE HYDROCHLORIDE 8 MCG: 100 INJECTION, SOLUTION INTRAVENOUS at 15:45

## 2023-10-23 RX ADMIN — FENTANYL CITRATE 25 MCG: 50 INJECTION, SOLUTION INTRAMUSCULAR; INTRAVENOUS at 17:04

## 2023-10-23 RX ADMIN — GLYCOPYRROLATE 0.2 MG: 0.2 INJECTION, SOLUTION INTRAMUSCULAR; INTRAVENOUS at 13:54

## 2023-10-23 RX ADMIN — SCOPALAMINE 1 PATCH: 1 PATCH, EXTENDED RELEASE TRANSDERMAL at 17:20

## 2023-10-23 RX ADMIN — Medication 20 MG: at 14:52

## 2023-10-23 RX ADMIN — FENTANYL CITRATE 50 MCG: 50 INJECTION, SOLUTION INTRAMUSCULAR; INTRAVENOUS at 12:54

## 2023-10-23 RX ADMIN — DEXAMETHASONE SODIUM PHOSPHATE 8 MG: 4 INJECTION, SOLUTION INTRA-ARTICULAR; INTRALESIONAL; INTRAMUSCULAR; INTRAVENOUS; SOFT TISSUE at 13:50

## 2023-10-23 RX ADMIN — DEXMEDETOMIDINE HYDROCHLORIDE 8 MCG: 100 INJECTION, SOLUTION INTRAVENOUS at 15:49

## 2023-10-23 RX ADMIN — SODIUM CHLORIDE, POTASSIUM CHLORIDE, SODIUM LACTATE AND CALCIUM CHLORIDE: 600; 310; 30; 20 INJECTION, SOLUTION INTRAVENOUS at 19:52

## 2023-10-23 RX ADMIN — ACETAMINOPHEN 975 MG: 325 TABLET, FILM COATED ORAL at 11:45

## 2023-10-23 RX ADMIN — DEXMEDETOMIDINE HYDROCHLORIDE 4 MCG: 100 INJECTION, SOLUTION INTRAVENOUS at 15:54

## 2023-10-23 RX ADMIN — FENTANYL CITRATE 50 MCG: 50 INJECTION INTRAMUSCULAR; INTRAVENOUS at 13:41

## 2023-10-23 RX ADMIN — ONDANSETRON 4 MG: 2 INJECTION INTRAMUSCULAR; INTRAVENOUS at 17:11

## 2023-10-23 RX ADMIN — HYDROMORPHONE HYDROCHLORIDE 0.2 MG: 0.2 INJECTION, SOLUTION INTRAMUSCULAR; INTRAVENOUS; SUBCUTANEOUS at 21:57

## 2023-10-23 RX ADMIN — HYDROMORPHONE HYDROCHLORIDE 0.4 MG: 0.2 INJECTION, SOLUTION INTRAMUSCULAR; INTRAVENOUS; SUBCUTANEOUS at 17:55

## 2023-10-23 RX ADMIN — HYDROMORPHONE HYDROCHLORIDE 0.4 MG: 0.2 INJECTION, SOLUTION INTRAMUSCULAR; INTRAVENOUS; SUBCUTANEOUS at 17:42

## 2023-10-23 RX ADMIN — PHENYLEPHRINE HYDROCHLORIDE 100 MCG: 10 INJECTION INTRAVENOUS at 14:35

## 2023-10-23 RX ADMIN — LIDOCAINE HYDROCHLORIDE 80 MG: 20 INJECTION, SOLUTION INFILTRATION; PERINEURAL at 13:41

## 2023-10-23 RX ADMIN — GLYCOPYRROLATE 0.2 MG: 0.2 INJECTION, SOLUTION INTRAMUSCULAR; INTRAVENOUS at 13:50

## 2023-10-23 RX ADMIN — Medication 50 MG: at 13:41

## 2023-10-23 RX ADMIN — ONDANSETRON 4 MG: 2 INJECTION INTRAMUSCULAR; INTRAVENOUS at 15:39

## 2023-10-23 RX ADMIN — Medication 2 G: at 13:47

## 2023-10-23 RX ADMIN — MIDAZOLAM 2 MG: 1 INJECTION INTRAMUSCULAR; INTRAVENOUS at 13:30

## 2023-10-23 RX ADMIN — HYDROMORPHONE HYDROCHLORIDE 0.2 MG: 0.2 INJECTION, SOLUTION INTRAMUSCULAR; INTRAVENOUS; SUBCUTANEOUS at 17:36

## 2023-10-23 RX ADMIN — PROPOFOL 120 MG: 10 INJECTION, EMULSION INTRAVENOUS at 13:41

## 2023-10-23 RX ADMIN — FENTANYL CITRATE 50 MCG: 50 INJECTION INTRAMUSCULAR; INTRAVENOUS at 15:08

## 2023-10-23 RX ADMIN — CYCLOBENZAPRINE 10 MG: 10 TABLET, FILM COATED ORAL at 20:57

## 2023-10-23 RX ADMIN — FENTANYL CITRATE 50 MCG: 50 INJECTION, SOLUTION INTRAMUSCULAR; INTRAVENOUS at 17:11

## 2023-10-23 RX ADMIN — HYDROMORPHONE HYDROCHLORIDE 0.5 MG: 1 INJECTION, SOLUTION INTRAMUSCULAR; INTRAVENOUS; SUBCUTANEOUS at 15:27

## 2023-10-23 RX ADMIN — BUPIVACAINE HYDROCHLORIDE 40 ML: 2.5 INJECTION, SOLUTION EPIDURAL; INFILTRATION; INTRACAUDAL; PERINEURAL at 13:00

## 2023-10-23 RX ADMIN — Medication 20 MG: at 14:04

## 2023-10-23 RX ADMIN — HYDROMORPHONE HYDROCHLORIDE 0.2 MG: 0.2 INJECTION, SOLUTION INTRAMUSCULAR; INTRAVENOUS; SUBCUTANEOUS at 17:21

## 2023-10-23 RX ADMIN — SUGAMMADEX 200 MG: 100 INJECTION, SOLUTION INTRAVENOUS at 16:00

## 2023-10-23 RX ADMIN — BUPIVACAINE 20 ML: 13.3 INJECTION, SUSPENSION, LIPOSOMAL INFILTRATION at 13:00

## 2023-10-23 RX ADMIN — SODIUM CHLORIDE, POTASSIUM CHLORIDE, SODIUM LACTATE AND CALCIUM CHLORIDE: 600; 310; 30; 20 INJECTION, SOLUTION INTRAVENOUS at 13:37

## 2023-10-23 RX ADMIN — FAMOTIDINE 20 MG: 10 INJECTION, SOLUTION INTRAVENOUS at 11:47

## 2023-10-23 RX ADMIN — HYDROMORPHONE HYDROCHLORIDE 0.2 MG: 0.2 INJECTION, SOLUTION INTRAMUSCULAR; INTRAVENOUS; SUBCUTANEOUS at 17:16

## 2023-10-23 RX ADMIN — FENTANYL CITRATE 50 MCG: 50 INJECTION INTRAMUSCULAR; INTRAVENOUS at 14:26

## 2023-10-23 RX ADMIN — Medication 10 MG: at 15:33

## 2023-10-23 RX ADMIN — HYDROMORPHONE HYDROCHLORIDE 0.2 MG: 0.2 INJECTION, SOLUTION INTRAMUSCULAR; INTRAVENOUS; SUBCUTANEOUS at 19:39

## 2023-10-23 RX ADMIN — SODIUM CHLORIDE, POTASSIUM CHLORIDE, SODIUM LACTATE AND CALCIUM CHLORIDE: 600; 310; 30; 20 INJECTION, SOLUTION INTRAVENOUS at 14:45

## 2023-10-23 RX ADMIN — MIDAZOLAM 1 MG: 1 INJECTION INTRAMUSCULAR; INTRAVENOUS at 12:55

## 2023-10-23 RX ADMIN — FENTANYL CITRATE 25 MCG: 50 INJECTION, SOLUTION INTRAMUSCULAR; INTRAVENOUS at 17:00

## 2023-10-23 RX ADMIN — ENOXAPARIN SODIUM 40 MG: 40 INJECTION SUBCUTANEOUS at 11:42

## 2023-10-23 RX ADMIN — HYDROMORPHONE HYDROCHLORIDE 0.4 MG: 0.2 INJECTION, SOLUTION INTRAMUSCULAR; INTRAVENOUS; SUBCUTANEOUS at 18:05

## 2023-10-23 ASSESSMENT — ACTIVITIES OF DAILY LIVING (ADL)
ADLS_ACUITY_SCORE: 18
ADLS_ACUITY_SCORE: 16
ADLS_ACUITY_SCORE: 18
ADLS_ACUITY_SCORE: 18

## 2023-10-23 NOTE — OP NOTE
[facility]    Brief Operative Note    Pre-operative diagnosis: Abdominal pain, generalized [R10.84]   Post-operative diagnosis * No post-op diagnosis entered *   Procedure: Procedure(s):  LAPAROTOMY, EXPLORATORY, WITH LYSIS OF ADHESIONS  small bowel resection; redo enteroenterostomy  and closure of mesenteric defect   Surgeon: Surgeon(s) and Role:     * Doyle Roth MD - Primary     * Hector Marquez MD - Resident - Assisting     * Shari Malave MD - Resident - Assisting   Anesthesia: General with Block    Estimated blood loss: 100   Drains: None   Specimens: ID Type Source Tests Collected by Time Destination   1 : Enterostomy Tissue Other SURGICAL PATHOLOGY EXAM Doyle Roth MD 10/23/2023  3:07 PM       Findings: Midline supraumbilical mesh. Moderate number of adhesions between small bowel and mesh .This required additional 45 minutes of OR time to enter the abdomen.    Duodenal switch anatomy was found (alimentary limb/biliopancreatic limb anatomy).  Alimentary limb pathway was ante-colic.  At beginning of surgery, alimentary limb length (60 cm); biliopancreatic limb length (unmeasured); common channel length (110 cm); internal hernia locations reviewed: Crowder location: wide open with twisting of BP limb under jess limb (this was a built in internal hernia; ie no single loop, but a twist of the intestine prior to constructing the prior enteroenterostomy); enteroenterostomy location: previously closed. Post-reconstruction limb lengths were alimentary limb 60 cm; BP limb 30 cm; common channel 410 cm.  Mesenteric defects (three in total) all closed with running suture.     Complications: None.   Implants: None.           INDICATIONS FOR PROCEDURE  Juanis Sparks is a 43 year old female who has previously undergone DEVON operation with conversion to traditional duodenal switch for bile reflux. She has chronic intermittent abdominal pain in  conjunction with malnutrition.    After understanding the risks and benefits of proceeding with an exploratory laparotomy, she agreed to an operation.    General risks related to abdominal surgery were reviewed with the patient.    These include, but are not limited to, death, myocardial infarction, pneumonia, urinary tract infection, deep venous thrombosis with or without pulmonary embolus, abdominal infection from bowel injury or abscess, bowel obstruction, wound infection, and bleeding.      OPERATIVE PROCEDURE:  Under the benefits of general endotracheal anesthesia, the peritoneal cavity was entered through a vertical midline incision above the umbilicus. Multiple adhesions were encountered. There was a mesh at this site that we cut through.     Adhesions between the abdominal wall and the underlying small bowel and omentum were completely dissected using sharp dissection. This portion of the procedure required 45 additional minutes of operating room time.    Ultimately we were able to access the peritoneal cavity after all anterior adhesions were released.    The small bowel was completely mobilized from ligament of Treitz to the ileocecal valve.      The anatomy was consistent with prior duodenal switch and the operation previously described by Dr. Doyle Ramos.    The findings were as noted above.    We immediately noted that there was a wide open Crowder's defect and that the small bowel had been reconstructed at the most recent operation in such a way that the BP limb had been brought up and under the jess limb causing some twisting of the bowel here; in order for this to have happened initially, it must have been constructed this way; despite this finding, there was NO evidence that the hernia was causing an active obstruction nor was there evidence that any ischemia was evident.     We determined that in order to fix the internal hernia and restore anatomy that laid flat and to resite the alimentary limb  in a way that we could re-create a long common channel, we would have to resect the entire enteroenterostomy and re-created two connections.     All three limbs of the anastomosis were transected just distal or proximal to the anastomosis using 60 mm white load endoGIA stapler. The mesentery of the bowel here was divided with a Ligasure.    Reconstruction of biliopancreatic to common channel pathway anatomy:  To re-create a long common channel, the distal end of the previous BP limb and the proximal end of the previous common channel were lined up in a side to side configuration.  The bowel was tacked together at the anti-mesenteric aspect.  Enterotomies were made on each piece of bowel and a white load endo-JAYDEN stapler device was placed to 60 mm and fired to create the enteroenterostomy.  The common enterotomy defect was closed using a white load endoGIA stapler device. The entire anastomosis was oversewn using 4-0 PDS which was carried down on to the mesentery to close the mesenteric defect here.     Reconstruction of alimentary to common channel pathway anatomy:  Next, the distal end of the alimentary limb was lined up with a point on the new common channel 30 cm from ligament of Treitz in a side to side configuration; this point was chosen in order to optimize absorption of nutrients, including vitamins and protein. The bowel was tacked together at the anti-mesenteric aspect.  Enterotomies were made on each piece of bowel and a white load endo-JAYDEN stapler device was placed to 60 mm and fired to create the enteroenterostomy.  The common enterotomy defect was closed using a white load endoGIA stapler device. The entire anastomosis was oversewn using 4-0 PDS which was carried down on to the mesentery to close the mesenteric defect here.     Finally, Inman's defect was closed with a running 4-0 PDS.     The abdomen was inspected for hemostasis.      The fascia was closed using 0 PDS and the subcutaneous tissue  was irrigated with saline.  The skin was closed with 4-0 monocryl and dermabond.    Sterile dressings were applied.  The patient tolerated the procedure well.       I was scrubbed for all critical components of the operation.    All sponge and needle counts were correct x 2 at the end of the procedure.    Doyle Roth MD  Surgery  [phone] (hospital )  [phone] (clinic nurses)    Initial report prepared by:  Hector Marquez MD, PhD, PGY-5  General Surgery

## 2023-10-23 NOTE — OR NURSING
Spoke to Dr. Melvin, she stated it is ok to give ancef even though the patient has an allergy to amoxicillin.

## 2023-10-23 NOTE — ANESTHESIA CARE TRANSFER NOTE
Patient: Juanis Sparks    Procedure: Procedure(s):  LAPAROTOMY, EXPLORATORY, WITH LYSIS OF ADHESIONS  small bowel resection; redo enteroenterostomy  and closure of mesenteric defect       Diagnosis: Abdominal pain, generalized [R10.84]  Diagnosis Additional Information: No value filed.    Anesthesia Type:   General     Note:    Oropharynx: oropharynx clear of all foreign objects and spontaneously breathing  Level of Consciousness: awake  Oxygen Supplementation: face mask  Level of Supplemental Oxygen (L/min / FiO2): 5  Independent Airway: airway patency satisfactory and stable  Dentition: dentition unchanged  Vital Signs Stable: post-procedure vital signs reviewed and stable  Report to RN Given: handoff report given  Patient transferred to: PACU    Handoff Report: Identifed the Patient, Identified the Reponsible Provider, Reviewed the pertinent medical history, Discussed the surgical course, Reviewed Intra-OP anesthesia mangement and issues during anesthesia, Set expectations for post-procedure period and Allowed opportunity for questions and acknowledgement of understanding      Vitals:  Vitals Value Taken Time   /79 10/23/23 1629   Temp 35.9    Pulse 69 10/23/23 1633   Resp 14    SpO2 99 % 10/23/23 1633   Vitals shown include unfiled device data.    Electronically Signed By: GAVINO Dallas CRNA  October 23, 2023  4:34 PM

## 2023-10-23 NOTE — ANESTHESIA POSTPROCEDURE EVALUATION
Patient: Juanis Sparks    Procedure: Procedure(s):  LAPAROTOMY, EXPLORATORY, WITH LYSIS OF ADHESIONS  small bowel resection; redo enteroenterostomy  and closure of mesenteric defect       Anesthesia Type:  General    Note:  Disposition: Admission   Postop Pain Control: Uneventful            Sign Out: Well controlled pain   PONV: No   Neuro/Psych: Uneventful            Sign Out: Acceptable/Baseline neuro status   Airway/Respiratory: Uneventful            Sign Out: Acceptable/Baseline resp. status   CV/Hemodynamics: Uneventful            Sign Out: Acceptable CV status; No obvious hypovolemia; No obvious fluid overload   Other NRE: NONE   DID A NON-ROUTINE EVENT OCCUR? No    Event details/Postop Comments:  No complications.           Last vitals:  Vitals Value Taken Time   /79 10/23/23 1629   Temp     Pulse 77 10/23/23 1634   Resp     SpO2 99 % 10/23/23 1634   Vitals shown include unfiled device data.    Electronically Signed By: Alfa Nunez MD  October 23, 2023  4:35 PM

## 2023-10-23 NOTE — ANESTHESIA PROCEDURE NOTES
Paravertebral Procedure Note    Pre-Procedure   Staff -        Anesthesiologist:  Ethan Hernandez MD       Resident/Fellow: Goldberg, Leslie, MD       Performed By: resident       Location: pre-op       Procedure Start/Stop Times: 10/23/2023 1:00 PM and 10/23/2023 1:10 PM       Pre-Anesthestic Checklist: patient identified, IV checked, site marked, risks and benefits discussed, informed consent, monitors and equipment checked, pre-op evaluation, at physician/surgeon's request and post-op pain management  Timeout:       Correct Patient: Yes        Correct Procedure: Yes        Correct Site: Yes        Correct Position: Yes        Correct Laterality: Yes        Site Marked: Yes  Procedure Documentation  Procedure: Paravertebral       Diagnosis: POST-OPERATIVE PAIN CONTROL       Laterality: bilateral       Patient Position: supine       Patient Prep/Sterile Barriers: sterile gloves, mask       Skin prep: Chloraprep       Insertion Site: T7-8.       Needle Type: short bevel       Needle Gauge: 21.        Needle Length (millimeters): 110        Ultrasound guided       1. Ultrasound was used to identify targeted nerve, plexus, vascular marker, or fascial plane and place a needle adjacent to it in real-time.       2. Ultrasound was used to visualize the spread of anesthetic in close proximity to the above referenced structure.       3. A permanent image is entered into the patient's record.    Assessment/Narrative         The placement was negative for: blood aspirated, painful injection and site bleeding       Paresthesias: No.       Bolus given via needle..        Secured via.        Insertion/Infusion Method: Single Shot       Complications: none       Injection made incrementally with aspirations every 5 mL.    Medication(s) Administered   Bupivacaine 0.25% PF (Infiltration) - Infiltration   40 mL - 10/23/2023 1:00:00 PM  Bupivacaine liposome (Exparel) 1.3% LA inj susp (Infiltration) - Infiltration   20  "mL - 10/23/2023 1:00:00 PM  Medication Administration Time: 10/23/2023 1:00 PM      FOR [facility]) ONLY:   Pain Team Contact information: please page the Pain Team [employer]. Search \"Pain\". During daytime hours, please page the attending first. At night please page the resident first.      "

## 2023-10-23 NOTE — ANESTHESIA PROCEDURE NOTES
Airway       Patient location during procedure: OR       Procedure Start/Stop Times: 10/23/2023 1:45 PM  Staff -        CRNA: Deena Chester APRN CRNA       Performed By: CRNA  Consent for Airway        Urgency: elective  Indications and Patient Condition       Indications for airway management: juan c-procedural       Induction type:intravenous       Mask difficulty assessment: 1 - vent by mask    Final Airway Details       Final airway type: endotracheal airway       Successful airway: ETT - single  Endotracheal Airway Details        ETT size (mm): 7.0       Cuffed: yes       Successful intubation technique: direct laryngoscopy       DL Blade Type: Burden 2       Grade View of Cords: 1       Adjucts: stylet       Position: Right       Measured from: gums/teeth       Secured at (cm): 21       Bite block used: None    Post intubation assessment        Placement verified by: capnometry, equal breath sounds and chest rise        Number of attempts at approach: 1       Secured with: pink tape       Ease of procedure: easy       Dentition: Intact and Unchanged    Medication(s) Administered   Medication Administration Time: 10/23/2023 1:45 PM

## 2023-10-23 NOTE — OR NURSING
Paravertebral Block performed without complications.  1mg versed and 50mcg fentanyl given. VSS on 2L NC.  Pt tolerated well.  Will continue to monitor. Hannah Levine RN on 10/23/2023 at 1:11 PM

## 2023-10-24 LAB — POTASSIUM SERPL-SCNC: 3.9 MMOL/L (ref 3.4–5.3)

## 2023-10-24 PROCEDURE — 250N000011 HC RX IP 250 OP 636: Mod: JZ | Performed by: STUDENT IN AN ORGANIZED HEALTH CARE EDUCATION/TRAINING PROGRAM

## 2023-10-24 PROCEDURE — 84132 ASSAY OF SERUM POTASSIUM: CPT | Performed by: SURGERY

## 2023-10-24 PROCEDURE — 250N000013 HC RX MED GY IP 250 OP 250 PS 637: Performed by: STUDENT IN AN ORGANIZED HEALTH CARE EDUCATION/TRAINING PROGRAM

## 2023-10-24 PROCEDURE — 36415 COLL VENOUS BLD VENIPUNCTURE: CPT | Performed by: SURGERY

## 2023-10-24 PROCEDURE — 120N000002 HC R&B MED SURG/OB UMMC

## 2023-10-24 PROCEDURE — 258N000003 HC RX IP 258 OP 636: Performed by: STUDENT IN AN ORGANIZED HEALTH CARE EDUCATION/TRAINING PROGRAM

## 2023-10-24 RX ORDER — ERGOCALCIFEROL 1.25 MG/1
50000 CAPSULE, LIQUID FILLED ORAL DAILY
COMMUNITY

## 2023-10-24 RX ADMIN — Medication 1 TABLET: at 20:33

## 2023-10-24 RX ADMIN — Medication 250 MG: at 07:57

## 2023-10-24 RX ADMIN — HYDROMORPHONE HYDROCHLORIDE 0.2 MG: 0.2 INJECTION, SOLUTION INTRAMUSCULAR; INTRAVENOUS; SUBCUTANEOUS at 23:44

## 2023-10-24 RX ADMIN — Medication 1 TABLET: at 07:57

## 2023-10-24 RX ADMIN — HYDROMORPHONE HYDROCHLORIDE 0.2 MG: 0.2 INJECTION, SOLUTION INTRAMUSCULAR; INTRAVENOUS; SUBCUTANEOUS at 17:06

## 2023-10-24 RX ADMIN — Medication 250 MG: at 20:33

## 2023-10-24 RX ADMIN — Medication 250 MG: at 14:46

## 2023-10-24 RX ADMIN — ENOXAPARIN SODIUM 40 MG: 40 INJECTION SUBCUTANEOUS at 13:02

## 2023-10-24 RX ADMIN — FERROUS SULFATE TAB 325 MG (65 MG ELEMENTAL FE) 325 MG: 325 (65 FE) TAB at 07:57

## 2023-10-24 RX ADMIN — HYDROMORPHONE HYDROCHLORIDE 0.2 MG: 0.2 INJECTION, SOLUTION INTRAMUSCULAR; INTRAVENOUS; SUBCUTANEOUS at 00:52

## 2023-10-24 RX ADMIN — Medication 400 UNITS: at 07:57

## 2023-10-24 RX ADMIN — OMEPRAZOLE 40 MG: 20 CAPSULE, DELAYED RELEASE ORAL at 07:57

## 2023-10-24 RX ADMIN — SODIUM CHLORIDE, POTASSIUM CHLORIDE, SODIUM LACTATE AND CALCIUM CHLORIDE: 600; 310; 30; 20 INJECTION, SOLUTION INTRAVENOUS at 06:59

## 2023-10-24 RX ADMIN — OXYCODONE HYDROCHLORIDE 5 MG: 5 TABLET ORAL at 04:16

## 2023-10-24 RX ADMIN — SODIUM CHLORIDE, POTASSIUM CHLORIDE, SODIUM LACTATE AND CALCIUM CHLORIDE: 600; 310; 30; 20 INJECTION, SOLUTION INTRAVENOUS at 18:48

## 2023-10-24 RX ADMIN — FERROUS SULFATE TAB 325 MG (65 MG ELEMENTAL FE) 325 MG: 325 (65 FE) TAB at 20:33

## 2023-10-24 RX ADMIN — OXYCODONE HYDROCHLORIDE 10 MG: 10 TABLET ORAL at 21:34

## 2023-10-24 RX ADMIN — CHOLECALCIFEROL CAP 1.25 MG (50000 UNIT) 50000 UNITS: 1.25 CAP at 07:57

## 2023-10-24 ASSESSMENT — ACTIVITIES OF DAILY LIVING (ADL)
ADLS_ACUITY_SCORE: 20
ADLS_ACUITY_SCORE: 20
ADLS_ACUITY_SCORE: 18
ADLS_ACUITY_SCORE: 20
ADLS_ACUITY_SCORE: 20
ADLS_ACUITY_SCORE: 18
ADLS_ACUITY_SCORE: 20
ADLS_ACUITY_SCORE: 18
ADLS_ACUITY_SCORE: 20
ADLS_ACUITY_SCORE: 18
ADLS_ACUITY_SCORE: 20
ADLS_ACUITY_SCORE: 20

## 2023-10-24 NOTE — PLAN OF CARE
Goal Outcome Evaluation:      Plan of Care Reviewed With: patient    Overall Patient Progress: improving    Time: 1930-0730  Status: POD 0-1 s/p LAPAROTOMY, EXPLORATORY, WITH LYSIS OF ADHESIONS  small bowel resection; redo enteroenterostomy and closure of mesenteric defect  Neuros: A&Ox4, calls appropriately.   Cardiac: WNL. Denies chest pain.   Respiratory: WNL on RA, on capnography monitoring. Denies SOB. IS encouraged.   Pain: Abdominal pain managed with prn IV dilaudid x2, prn oxycodone x1 & one time dose of flexeril with relief.   Nausea: Scopolamine patch on, no further c/o nausea this shift.   Diet: CLD  GI/: Voiding adequate amount. Abdomen soft, hypo BS, -flatus, no BM.   Skin/incision: Midline incision with liquid bandage, covered with ABD pad, Abdominal binder on.   Lines: Left PIV infusing LR@75 ml/hr.   Drains: N/A  Labs: Reviewed.   Activity: Activity encouraged. Up ambulated to  with SBA.   New Changes this Shift: No acute changes.   Plan: Continue POC.

## 2023-10-24 NOTE — PROGRESS NOTES
Physician Attestation  I, Doyle Roth, saw and evaluated this patient as part of a shared visit.  I have reviewed and discussed with the advanced practice provider and/or resident their history, physical and plan.    I personally reviewed the vital signs, medications, labs and imaging.    My key history or physical exam findings: overall stable    Key management decisions made by me: advance to full liquds    Doyle Roth MD  Date of Service (when I saw the patient):  10/24/23            [facility]    Progress Note - Minimally Invasive Surgery Service       Date of Admission:  10/23/2023    Assessment & Plan: Surgery   Juanis Sparks is a 43 year old female with history of laparoscopic duodenal switch, post-surgical malabsortpion and reflux, iron deficiency anemia, history of gastric ulcer, history of small bowel obstruction admitted after an open laparotomy with small bowel resection redo enteroenterostomy, closure of mesenteric defect, and lysis of adhesions on 10/23/2023. She is doing well post-operatively.    - CLD in AM, then ADAT  - Reduce IVF as PO intake increases  - Encourage ambulation  - Multimodal pain control PRN  - Encourage IS use       Diet: Clear Liquid Diet    DVT Prophylaxis: Enoxaparin (Lovenox) SQ  Anthony Catheter: Not present  Lines: None     Drains: None     Cardiac Monitoring: ACTIVE order. Indication: Procedural area  Code Status: Full Code      Clinically Significant Risk Factors                                    Disposition Plan      Expected Discharge Date: 10/24/2023,  6:00 PM    Destination: home with family           The patient's care was discussed with the Chief Resident/Fellow.    Shari Malave MD  [facility]  Non-urgent messages: Securely message with [employer] (more info)  Text page via [facility] Paging/Directory      ______________________________________________________________________    Interval History   NAEO. Patient feels well. Pain well controlled. Denies flatus/BM. Denies nausea/vomiting. Has had a few sips of clear liquids.    Physical Exam   Vital Signs: Temp: 99.5  F (37.5  C) Temp src: Oral BP: 115/69 Pulse: 78   Resp: 20 SpO2: 97 % O2 Device: None (Room air) Oxygen Delivery: 3 LPM  Weight: 0 lbs 0 oz  Intake/Output Summary (Last 24 hours) at 10/24/2023 1437  Last data filed at 10/24/2023 1000  Gross per 24 hour   Intake 2130 ml   Output 1050 ml   Net 1080 ml     General Appearance: Alert, oriented, in NAD  Respiratory: Breathing comfortably on RA  Cardiovascular: Regular rate and rhythm  GI: Abdomen soft, non-distended, appropriately tender. Incision c/d/i covered with dermabond  Skin: Warm and dry         Data     I have personally reviewed the following data over the past 24 hrs:    N/A  \   N/A   / N/A     N/A N/A N/A /  N/A   3.9 N/A 0.51 \       Imaging results reviewed over the past 24 hrs:   No results found for this or any previous visit (from the past 24 hour(s)).

## 2023-10-24 NOTE — PROGRESS NOTES
MIS/Bariatric Surgery Progress Note  Surgery Cross-Cover  Post Op Check    10/23/2023    Juanis Sparks is a 43 year old female POD#0 s/p Procedure(s):  LAPAROTOMY, EXPLORATORY, WITH LYSIS OF ADHESIONS  small bowel resection; redo enteroenterostomy  and closure of mesenteric defect for Pre-Op Diagnosis Codes:     * Abdominal pain, generalized [R10.84]    Pt reports their pain is controlled with current regimen. Denies nausea, SOB, chest pain, or dizziness. Patient is not passing flatus or having bowel movements and has not yet voided. Incentive spirometer at bedside.    /73 (BP Location: Right arm, Patient Position: Semi-Gasca's, Cuff Size: Adult Regular)   Pulse 76   Temp 97.7  F (36.5  C) (Oral)   Resp 15   SpO2 96%     Gen: A&O x4, NAD   Chest: breathing non-labored on room air  Abdomen: soft, appropriately-tender, non-distended, small midline incision covered with dermabond, abdominal binder in place  Incision: clean, dry, intact closed with dermabond  Extremities: warm and well perfused with SCDs on    A/P: No acute post-op issues. Continue plan of care per primary team. Please call with any questions.    Jerrell Reid MD  PGY-1 Surgery

## 2023-10-24 NOTE — PROGRESS NOTES
Admitted/transferred from:   2 RN full   skin assessment completed by Ami Garza RN and CHANDA Puga RN.  Skin assessment finding: abdomen incision CDI, abdominal binder in place.  PIV infusing IV fluid.   Interventions/actions: no other intervention needed at this time.      Will continue to monitor.

## 2023-10-24 NOTE — PLAN OF CARE
Goal Outcome Evaluation:      Plan of Care Reviewed With: patient    Patient arrived from PACU at 1850. A/O x4, vitally stable. Patient walked from stretcher to bed, tolerated well.  Abd incision CDI, abd binder in place. Pt reports abd pain, declined flexirl. Will continue to monitor.

## 2023-10-24 NOTE — PHARMACY-ADMISSION MEDICATION HISTORY
Pharmacist Admission Medication History    Admission medication history is complete. The information provided in this note is only as accurate as the sources available at the time of the update.    Information Source(s): Patient and CareEverywhere/SureScripts via in-person    Pertinent Information:   - Patient was a reliable historian able to confirm all medication names, strengths, frequencies, and last doses.   - Patient prescribed a variety of different vitamin D formulations. Patient reports they are currently taking cholecalciferol 5000 units daily plus ergocalciferol 18746 units daily. The patient has been unable to  liquid vitamin D from pharmacy plans to switch to this once able.   - Patient prescribed sucralfate and omeprazole s/p bariatric surgery. Patient reports not taking either medication. Per chart review, neither medication was discontinued by a provider.   - Patient prescribed copper gluconate but has not been taking due to unable to obtain from pharmacy.   - Patient recently filled gabapentin on 10/03 but reports not taking and having no plans to start taking it    Changes made to PTA medication list:  Added: None  Deleted: Sublingual Vitamin B12 (patient receives injection)  Changed:   TUMS to 1 tablet TID PRN  Ferrous sulfate to 325 mg in the AM and 650 mg in the PM (Previously prescribed as 1 tablet BID. Patient reports this has since changed)  Vitamin C to 1 tablet in the AM and 2 tablets in the PM  Cholecalciferol 22490 units to ergocalciferol 19749 units. Surescript's reports patient has consistently been filling ergocalciferol.     Allergies reviewed with patient and updates made in EHR: yes    Medication History Completed By: Kaiden Toro [facility] 10/24/2023 9:40 AM    Prior to Admission medications    Medication Sig Last Dose Taking? Auth Provider Long Term End Date   calcium carbonate (TUMS) 500 MG chewable tablet Take 1 chew tab by mouth 3 times daily as needed for heartburn Past Week  Yes Reported, Patient     Calcium Carbonate-Vitamin D 600-5 MG-MCG TABS Take 1 tablet by mouth 2 times daily Past Week Yes Reported, Patient     cyanocobalamin (CYANOCOBALAMIN) 1000 MCG/ML injection Inject 1,000 mcg into the muscle every 14 days Next dose 10/15/23 10/8/2023 Yes Reported, Patient     Ferrous Sulfate 324 MG TBEC Take 1 tablet by mouth in the AM and 2 tablets at bedtime. Past Week Yes Reported, Patient     multivitamin w/minerals (MULTI-VITAMIN) tablet Take 1 tablet by mouth daily Past Week Yes Reported, Patient No    VITAMIN A PO Take 1 tablet by mouth daily Past Week Yes Reported, Patient     vitamin C (ASCORBIC ACID) 250 MG tablet Take 1 tablet (250 mg) by mouth 3 times daily  Patient taking differently: Take 1 tablet by mouth in the AM and 2 tablets by mouth at bedtime Past Week Yes Britta Mcgee NP     vitamin D2 (ERGOCALCIFEROL) 70349 units (1250 mcg) capsule Take 50,000 Units by mouth daily Past Week Yes Unknown, Entered By History     vitamin D3 (CHOLECALCIFEROL) 125 MCG (5000 UT) tablet Take 5,000 Units by mouth daily Past Week Yes Reported, Patient     vitamin E (TOCOPHEROL) 400 units (180 mg) capsule Take 1 capsule (400 Units) by mouth daily Past Week Yes Britta Mcgee NP     Zinc 100 MG TABS Take 1 tablet (50 mg) by mouth daily Past Week Yes Britta Mcgee NP     copper gluconate 2 MG tablet Take 1 tablet (2 mg) by mouth daily  Patient not taking: Reported on 10/9/2023   Britta Mcgee NP     copper gluconate 2 MG tablet Take 1 tablet (2 mg) by mouth daily  Patient not taking: Reported on 10/9/2023   Britta Mcgee NP     lipase-protease-amylase (CREON) 67230-46879-437731 units CPEP per EC capsule Take 3 capsules by mouth 3 times daily (with meals)  Patient not taking: Reported on 10/24/2023 Not Taking  Britta Mcgee NP     omeprazole (PRILOSEC) 40 MG DR capsule Take 1 capsule (40 mg) by mouth daily  Patient not taking: Reported on 10/24/2023 Not Taking  Britta Mcgee NP     sucralfate (CARAFATE) 1  GM/10ML suspension Take 10 mLs (1 g) by mouth 4 times daily as needed (epigastric pain, regurgitation)  Patient not taking: Reported on 10/24/2023 Not Taking  Britta Mcgee NP     Vitamin D 12.5 MCG/0.25ML LIQD Take 2.5 mLs by mouth daily Goal is 5,000 international unit(s) of water miscible vitamin D 3 daily (125mcg)  Patient not taking: Reported on 10/24/2023 Not Taking  Britta Mcgee NP

## 2023-10-25 ENCOUNTER — PATIENT OUTREACH (OUTPATIENT)
Dept: ENDOCRINOLOGY | Facility: CLINIC | Age: 43
End: 2023-10-25
Payer: COMMERCIAL

## 2023-10-25 VITALS
RESPIRATION RATE: 16 BRPM | TEMPERATURE: 98.4 F | SYSTOLIC BLOOD PRESSURE: 103 MMHG | DIASTOLIC BLOOD PRESSURE: 67 MMHG | OXYGEN SATURATION: 98 % | HEART RATE: 75 BPM

## 2023-10-25 PROCEDURE — 250N000013 HC RX MED GY IP 250 OP 250 PS 637: Performed by: STUDENT IN AN ORGANIZED HEALTH CARE EDUCATION/TRAINING PROGRAM

## 2023-10-25 RX ORDER — OXYCODONE HYDROCHLORIDE 5 MG/1
5-10 TABLET ORAL EVERY 6 HOURS PRN
Qty: 20 TABLET | Refills: 0 | Status: SHIPPED | OUTPATIENT
Start: 2023-10-25

## 2023-10-25 RX ORDER — ACETAMINOPHEN 325 MG/1
650 TABLET ORAL EVERY 4 HOURS PRN
Qty: 40 TABLET | Refills: 0 | Status: SHIPPED | OUTPATIENT
Start: 2023-10-25

## 2023-10-25 RX ADMIN — Medication 400 UNITS: at 07:44

## 2023-10-25 RX ADMIN — FERROUS SULFATE TAB 325 MG (65 MG ELEMENTAL FE) 325 MG: 325 (65 FE) TAB at 07:44

## 2023-10-25 RX ADMIN — Medication 250 MG: at 07:44

## 2023-10-25 RX ADMIN — Medication 1 TABLET: at 07:43

## 2023-10-25 RX ADMIN — Medication 1 TABLET: at 07:44

## 2023-10-25 RX ADMIN — OXYCODONE HYDROCHLORIDE 10 MG: 10 TABLET ORAL at 05:59

## 2023-10-25 RX ADMIN — CHOLECALCIFEROL CAP 1.25 MG (50000 UNIT) 50000 UNITS: 1.25 CAP at 07:43

## 2023-10-25 ASSESSMENT — ACTIVITIES OF DAILY LIVING (ADL)
ADLS_ACUITY_SCORE: 20

## 2023-10-25 NOTE — DISCHARGE SUMMARY
[facility] Discharge Summary    Date of Admission: 10/23/2023  Date of Discharge: 10/25/2023    Admission Diagnosis:  1. Abdominal pain    Discharge Diagnosis:  1. Same as above    Consultations:  None    Procedures:  1. Exploratory laparotomy, redo enteroenterostomy, closure of mesenteric defect, and lysis of adhesions by Dr Roth (10/23/23)    Brief HPI:  Patient is a 43 year old female with a history of previous duodenal switch (converted from DEVON due to bile reflux) who has had chronic intermittent abdominal pain in conjunction with poor PO intake and associated malnutrition. Patient was seen in clinic and it was recommended she undergo exploratory laparotomy and lysis of adhesions.    Hospital Course:  The patient was admitted and underwent the above procedure. The patient tolerated the procedure well. There were no complications. The patient's diet was slowly advanced to full liquid diet as bowel function returned. Pain was controlled with oral pain medication and the patient was able to ambulate and void without difficulty. The patient received appropriate education post operatively. On POD #2 the patient was discharged to home.    Discharge Physical Exam:  /67 (BP Location: Right arm)   Pulse 75   Temp 98.4  F (36.9  C) (Oral)   Resp 16   SpO2 98%     Gen: NAD  Lungs: non-labored breathing  CV: regular rhythm, normal rate   Abd: obese, soft, nondistended, appropriately tender, incisions are c/d/i  Ext: no peripheral edema  Neuro: AOx3    Meds:       Review of your medicines        UNREVIEWED medicines. Ask your doctor about these medicines        Dose / Directions   * copper gluconate 2 MG tablet  Used for: S/P bariatric surgery, Intestinal malabsorption, unspecified type, Vitamin deficiency, Mild protein-calorie malnutrition (H24)      Dose: 2 mg  Take 1 tablet (2 mg) by mouth daily  Quantity: 30 tablet  Refills: 0     * copper gluconate 2 MG  tablet  Used for: S/P bariatric surgery, Intestinal malabsorption, unspecified type, Vitamin deficiency      Dose: 2 mg  Take 1 tablet (2 mg) by mouth daily  Quantity: 90 tablet  Refills: 1     lipase-protease-amylase [id number] units Cpep per EC capsule  Commonly known as: Creon  Used for: S/P bariatric surgery, Intestinal malabsorption, unspecified type, Vitamin deficiency, Hx of gastric ulcer, Gastroesophageal reflux disease with esophagitis, unspecified whether hemorrhage, Mild protein-calorie malnutrition (H24)      Dose: 3 capsule  Take 3 capsules by mouth 3 times daily (with meals)  Quantity: 270 capsule  Refills: 3     omeprazole 40 MG DR capsule  Commonly known as: PriLOSEC  Used for: S/P bariatric surgery, Hx of gastric ulcer, Gastroesophageal reflux disease with esophagitis, unspecified whether hemorrhage      Dose: 40 mg  Take 1 capsule (40 mg) by mouth daily  Quantity: 90 capsule  Refills: 3     sucralfate 1 GM/10ML suspension  Commonly known as: CARAFATE  Used for: S/P bariatric surgery, Hx of gastric ulcer, Gastroesophageal reflux disease with esophagitis, unspecified whether hemorrhage, Epigastric pain      Dose: 1 g  Take 10 mLs (1 g) by mouth 4 times daily as needed (epigastric pain, regurgitation)  Quantity: 414 mL  Refills: 3     Vitamin D 12.5 MCG/0.25ML Liqd  Used for: S/P bariatric surgery, Intestinal malabsorption, unspecified type, Vitamin deficiency      Dose: 2.5 mL  Take 2.5 mLs by mouth daily Goal is 5,000 international unit(s) of water miscible vitamin D 3 daily (125mcg)  Quantity: 240 mL  Refills: 3           * This list has 2 medication(s) that are the same as other medications prescribed for you. Read the directions carefully, and ask your doctor or other care provider to review them with you.                START taking        Dose / Directions   acetaminophen 325 MG tablet  Commonly known as: TYLENOL  Used for: S/P bariatric surgery      Dose: 650 mg  Take 2 tablets (650  mg) by mouth every 4 hours as needed for other (For optimal non-opioid multimodal pain management to improve pain control and physical function.)  Quantity: 40 tablet  Refills: 0     oxyCODONE 5 MG tablet  Commonly known as: ROXICODONE  Used for: S/P bariatric surgery      Dose: 5-10 mg  Take 1-2 tablets (5-10 mg) by mouth every 6 hours as needed for moderate pain  Quantity: 20 tablet  Refills: 0            CONTINUE these medicines which have NOT CHANGED        Dose / Directions   calcium carbonate 500 MG chewable tablet  Commonly known as: TUMS  Indication: Acid Indigestion, Heartburn      Dose: 1 chew tab  Take 1 chew tab by mouth 3 times daily as needed for heartburn  Refills: 0     Calcium Carbonate-Vitamin D 600-5 MG-MCG Tabs      Dose: 1 tablet  Take 1 tablet by mouth 2 times daily  Refills: 0     cyanocobalamin 1000 MCG/ML injection  Commonly known as: CYANOCOBALAMIN      Dose: 1,000 mcg  Inject 1,000 mcg into the muscle every 14 days Next dose 10/15/23  Refills: 0     Ferrous Sulfate 324 MG Tbec      Take 1 tablet by mouth in the AM and 2 tablets at bedtime.  Refills: 0     Multi-vitamin tablet      Dose: 1 tablet  Take 1 tablet by mouth daily  Refills: 0     VITAMIN A PO      Dose: 1 tablet  Take 1 tablet by mouth daily  Refills: 0     vitamin C 250 MG tablet  Commonly known as: ASCORBIC ACID  Used for: S/P bariatric surgery, Intestinal malabsorption, unspecified type, Vitamin deficiency, Hyperparathyroidism, unspecified (H24)      Dose: 250 mg  Take 1 tablet (250 mg) by mouth 3 times daily  Quantity: 90 tablet  Refills: 3     vitamin D2 49709 units (1250 mcg) capsule  Commonly known as: ERGOCALCIFEROL      Dose: 50,000 Units  Take 50,000 Units by mouth daily  Refills: 0     vitamin D3 125 MCG (5000 UT) tablet  Commonly known as: CHOLECALCIFEROL  Indication: Duplicate      Dose: 5,000 Units  Take 5,000 Units by mouth daily  Refills: 0     vitamin E 400 units (180 mg) capsule  Commonly known as:  TOCOPHEROL  Used for: S/P bariatric surgery, Hx of gastric ulcer      Dose: 400 Units  Take 1 capsule (400 Units) by mouth daily  Quantity: 90 capsule  Refills: 3     Zinc 100 MG Tabs  Used for: S/P bariatric surgery, Intestinal malabsorption, unspecified type, Vitamin deficiency, Mild protein-calorie malnutrition (H24)      Dose: 1 tablet  Take 1 tablet (50 mg) by mouth daily  Quantity: 90 tablet  Refills: 1               Where to get your medicines        Some of these will need a paper prescription and others can be bought over the counter. Ask your nurse if you have questions.    Bring a paper prescription for each of these medications  acetaminophen 325 MG tablet  oxyCODONE 5 MG tablet         Additional instructions:  After Care       Future Labs/Procedures    Activity     Comments:    Your activity upon discharge: activity as tolerated    No lifting more than 15 pounds for 4 weeks.    Diet     Comments:    Follow this diet upon discharge: Orders Placed This Encounter      Full Liquid Diet    Discharge Instructions     Comments:    Bariatric Surgery Discharge Instructions  [facility] Weight [facility]     Note: Ask your nurse to order your medications from the pharmacy. Be sure you have your medications with you when you leave.  Diet on discharge Full liquid diet.      How much fluid should I drink?  Strive for 48-64 ounces daily.  Carry a water bottle with you without a straw or sports top. Drink from it often.  Keep track of how much fluid you drink in a day.  Remember:  -Do not use straws, chew gum or suck on hard candies. They may cause painful gas.    -Sip, don't slurp when you drink.    -Practice small sips using a medicine cup for the first week postop.   -No ice or cold drinks. This could cause gas or spasms.   -No coffee, soda pop or drinks with caffeine. These may cause stomach pain.   -No alcohol. It is bad for your liver and will cause stomach pain.    How often should I do my  deep breathing and coughing?  Use your incentive spirometer (small plastic breathing device) every hour while awake after you get home. Using the incentive spirometer helps you deep breath. Continuing to cough and deep breath will help prevent fevers and pneumonia.   If you do not have a fever after one week, you can stop using the incentive spirometer and discard it.     You can continue to take deep breaths without the incentive spirometer every one to two hours while awake for the month after surgery.    What kinds of activity can I do?  Get plenty of rest the first few days after surgery and try to balance rest and activity. You will need some time to recover - you may be more tired than you realize at first. You'll feel better and heal faster if you take good care of yourself.  For 4 weeks after surgery (Please review restrictions at your one week visit, they could change based on how well you are doing):  -Don't lift more than 15 pounds.   -Take 4-5 short walks every day.  -Don't jog, run, or do belly exercises.  Don't swim, bathe or use a hot tub until your cuts are healed (scabs are gone).  You may shower  Don't plan to fly or take a road trip within the first 1 to 3 months after surgery.  You could get a blood clot in your legs. If you must travel, get up and move around every hour for at least 5 minutes before continuing on your journey.  Your care team can help you decide when it's safe for you to travel.     What can I do for pain control?  You had major belly surgery that involves all layers of your belly muscles. Pain is expected, even for some as far out as 6-8 weeks after surgery. Moving, sneezing, coughing, and breathing will cause discomfort because these activities use your belly muscles.   Please see your after visit summary medication review for what pain medication will be continued, discontinued and newly started for you.    You can take opioid pain medicine if prescribed and if needed. Try to  wean off from it as soon as you feel comfortable.   Do not drive while you are taking opioid pain medicine. This is dangerous.  You can take acetaminophen (Tylenol) between your prescribed pain medicine on a scheduled basis OR take it scheduled every 6 hours (check with your care team for specifics).  -Acetaminophen:   -Do not take more than 3000 mg Tylenol in a 24 hour period.  -You may also take Tylenol for pain in place of the opioid pain medicine (check with your care team for specifics).   You can apply ice or heat to the affected area(s). Just remember to wrap the ice in something and limit icing sessions to 20 minutes. Excessive icing can irritate the skin or cause skin damage.  You can apply heat with a hot, wet towel or heating pad. Just like cold therapy, limit heat application to 20 minutes. Never sleep with a heating pad on. It could cause severe burns to your skin.  Wear your binder to support your belly muscles if you have one.  Take this off a little more each day and try to be off completely by 2 weeks after surgery. If you don't need your binder for comfort or support, you don't need to wear it.   You may not be able to sleep in a comfortable position for a few weeks after surgery. This is normal. You may be more comfortable sleeping in a recliner or propped up with 3 pillows for the first couple of weeks after surgery.  Do not take NSAID's (Non-Steroidal Anti-Inflammatory Drugs) (examples: ibuprofen, Motrin, Advil, Aleve, and Naproxen), aspirin, or use pain patches with NSAID's. They will increase your risk of bleeding or getting an ulcer.    Please call the clinic for any of the following pain concerns, we would like to talk to you:  -pain that does not improve with rest  -pain that gets worse and worse  -pain that is not controlled by your pain medicine  -a sudden severe increase in pain    What should I know about my incisions (cuts)?  Your incisions are covered with surgical glue. This will  fall off on its own in 1-2 weeks.  You may shower in the hospital after surgery and can get your incision coverings wet.  Do not submerge in water (e.g. No baths, swimming pools, hot tubs) until your care team tells you it is okay and your incisions are completely healed.    Call the clinic if you have any of these signs or symptoms of infection:  -Redness around the site.  -Drainage that smells bad.  -Drainage that is thick yellow or green.  -An increase in pain around the incision site  -An increase in swelling around the incision site  -Heat or warmth around incision site   -Fever of 101.5  F (38.3  C) or higher when taken under the tongue.    -Chills    Will my urine or bowel movements change?   Your first bowel movements (stools) will likely be liquid. You may also notice old blood or a darker color (black or maroon color) in your bowel movements.  This is not unusual and usually goes away after the first week, if not sooner. You may not have a bowel movement for a week.   If you have not had a bowel movement for at least three days after your surgery date and are passing gas, you can use over the counter stool softeners.  Please stop taking the stool softeners and laxatives if your stools are loose.  Increasing fluids and activity as well as getting off narcotics will help prevent constipation.    Call the clinic if:   -You have stomach pain.   -You continue to have constipation.  -You have excessive bloating after walking and passing gas.    How can I prevent dehydration if I feel nauseated (sick to my stomach) and vomit (throw up)?   Vomiting is not normal after surgery. If you continue to have nausea and vomiting, call the clinic.   Nausea can be a sign of dehydration. That is why it is very important to track your fluids.  Do not nap more than one hour during the day. Set a timer to wake yourself up, if needed. Too much sleep will keep you from drinking enough fluid during the day and lead to  "dehydration.  No outside activity in hot, humid weather until you can drink 48 to 64 ounces of fluid in 24 hours. If you sweat a lot, your body may lose too much water.  Try to take a 1 ounce sip of water (one medicine cup) every 15 minutes.  Set a timer to remind yourself.    Call the clinic if you have any of these signs or symptoms of dehydration:  -Dark colored urine  -Urinating (pass water) less than 2-3 times per day  -Lack of energy  -Nausea  -Dizziness  -Headache    Call the clinic ANY TIME at [phone] if:  -Your pain medicine is not working.  -You have a fever = 101.5 F.  -You have belly or left shoulder pain that gets worse and worse.  -You have a swollen leg with redness, warmth, or pain behind the knee or calf.  -You have chest pain   -You feel very short of breath.  -You have a sudden severe increase in heart rate.  -You have vomiting that gets worse and worse.  -You have constant nausea (feeling sick to your stomach) that does not go away with medication.  -You have trouble swallowing.  -You have an increasing feeling that \"something is not right\".  -You have hiccups that do not stop.  -You have any questions or concerns.    AFTER HOURS QUESTIONS OR CONCERNS: Call [phone] and ask to speak with surgery resident if you are having troubles in the evenings, at night, or on weekends. Please call if you experience increasing abdominal pain, nausea, vomiting, increasing drainage from your wounds, chills, or fever >101.5    If you have to go to the Emergency Room, we prefer you go to the hospital that did your surgery. Please let them know that you had bariatric surgery and to notify your surgeon.    When should I go back to the clinic?  Follow up with your care team in 1-2 weeks.   If this appointment was not already made, please call: [phone]    Appointments located at [facility] clinic:  [facility] ([facility])    [address]  [place] [address]      "           Follow Up:  -Follow up with Dr Roth in clinic in 2 week(s) after discharge.       BARIATRIC PATIENTS:  Call [phone] to schedule or to reach your care team    GENERAL SURGERY PATIENTS: Call [phone] for scheduling needs or to reach your care team

## 2023-10-25 NOTE — PROGRESS NOTES
Resident/Fellow Attestation   I, Juan Nelson MD, was present with the medical/SHALONDA student who participated in the service and in the documentation of the note.  I have verified the history and personally performed the physical exam and medical decision making.  I agree with the assessment and plan of care as documented in the note.      POD2 from ex-lap, resection of enteroenterostomy, closure of mesenteric defect, and KENYETTA. Patient is recovering well, pain well controlled. Tolerating full liquid diet. Minimal ambulation.     - Continue full liquid diet  - Encourage ambulation  - Pain control with PO medications  - Anticipate discharge this afternoon vs tomorrow AM    Juan Nelson MD  PGY5  Date of Service (when I saw the patient): 10/25/23    [facility]    Progress Note - MIS Service       Date of Admission:  10/23/2023    Assessment & Plan: Surgery   Juanis Sparks is a 43 year old female with history of laparoscopic duodenal switch, post-surgical malabsortpion and reflux, iron deficiency anemia, history of gastric ulcer, history of small bowel obstruction admitted after an open laparotomy with small bowel resection redo enteroenterostomy, closure of mesenteric defect, and lysis of adhesions on 10/23/2023. She is doing well post-operatively.    - Continue full liquid diet  - Encourage ambulation  - Multimodal pain control PRN  - Encourage IS use       Diet: Full Liquid Diet    DVT Prophylaxis: Enoxaparin (Lovenox) SQ  Anthony Catheter: Not present  Lines: None     Drains: None     Cardiac Monitoring: None  Code Status: Full Code      Clinically Significant Risk Factors                                    Disposition Plan            The patient's care was discussed with the Chief Resident/Fellow.    Miko Shafer, MS4    [facility]  Non-urgent messages: Securely message with Vocera (more info)  Text page via [employer]  Paging/y     ______________________________________________________________________    Interval History   No acute events overnight. Mild pain to incision sites, well controlled on PO pain medication. Tolerating full liquid diet without nausea/vomiting. Denies flatus or bowel movement. Reports previous diet prior to hospitalization was less than 1 meal per day of solid food.     Physical Exam   Vital Signs: Temp: 98.1  F (36.7  C) Temp src: Axillary BP: 102/63 Pulse: 80   Resp: 17 SpO2: 98 % O2 Device: None (Room air)    Weight: 0 lbs 0 oz  Intake/Output Summary (Last 24 hours) at 10/25/2023 0773  Last data filed at 10/25/2023 0659  Gross per 24 hour   Intake 1273.75 ml   Output 1900 ml   Net -626.25 ml     General: A&O x4, NAD  Respiratory: non-labored respirations  Cardiovascular: regular rate and rhythm  GI: abdomen soft, nondistended, appropriately tender, incision c/d/i covered with dermabond and abdominal binder  Extremities: warm and well perfused

## 2023-10-25 NOTE — LETTER
10/25/2023       RE: Juanis Sparks  [address] [place] [facility] [address]     To whom it may concern:    It is medically necessary to excuse Juanis from work 10/23-11/1/2023. She may return to work 11/2/2023 with a 15 lbs lifting restriction. She has no restrictions after 11/22/2023.    Please call my office if you have any questions.    Sincerely,    Doyle Roth MD  Department of General Surgery  [phone]    rr

## 2023-10-25 NOTE — PLAN OF CARE
Goal Outcome Evaluation:      Plan of Care Reviewed With: patient      /67 (BP Location: Right arm)   Pulse 77   Temp 98.4  F (36.9  C) (Oral)   Resp 18   SpO2 97%       Neuros: A&Ox4, calls appropriately.   Cardiac: WNL. Denies chest pain.   Respiratory: WNL on RA, Denies SOB. IS encouraged.   Pain: Abdominal pain managed with prn IV dilaudid given x 1  Nausea: Scopolamine patch on, no further c/o nausea this shift.   Diet: Full Liquid Diet, tolerating well  GI/: Voiding adequate amount. Abdomen soft, hypo BS, -flatus, no BM.   Skin/incision: Midline incision with liquid bandage, covered with ABD pad, Abdominal binder on.   Lines: Left PIV infusing LR@75 ml/hr.   Drains: N/A  Labs: Reviewed.   Activity: Activity encouraged. Up ambulated to BR with SBA.   New Changes this Shift: No acute changes.   Plan: Continue POC.

## 2023-10-25 NOTE — PROGRESS NOTES
RN Post-Op/Post-Discharge Care Coordination Note    Ms. Juanis Sparks is a 43 year old female who underwent  LAPAROTOMY, EXPLORATORY, WITH LYSIS OF ADHESIONS  small bowel resection; redo enteroenterostomy  and closure of mesenteric defect on 10/23 with  Dr. Robin Roth.  Spoke with Patient.    Support  Patient able to care for self independently     Health Status  Nausea/Vomiting: Patient denies nausea/vomiting.  Eating/drinking:  tolerating full liquid diet  Bowel habits: Patient reports no bowel movement since surgery. Last BM 10/22. Passing gas. Is starting Senna  Drains (MICHELLE): N/A  Fevers/chills: Patient denies any fever or chills.  Incisions: Patient denies any signs and symptoms of infection..  Wound closure:  Skin Sealant  Pain: 0/10  New Medications:  oxycodone, not taking    Activity/Restrictions  Other no lifting >15 lbs x 4 weeks    Equipment  Abdominal binder    Pathology reviewed with patient:  N/A    Forms/Letters  [facility]    All of her questions were answered including reviewing restrictions, pathologyNA, and wound care.  She will call this office if she has any further questions and/or concerns.      Patient scheduled for follow up with Dr Roth    A copy of this note was routed to the primary surgeon.      Whom and When to Call  Patient acknowledges understanding of how to manage any medication changes and   when to seek medical care.     Patient advised that if after hour medical concerns arise to please call [phone] and choose option 4 to speak to the physician on call.                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  The patient states that she rolled her left ankle and had immediate pain and swelling.  She was able to get up and walk and finish her day classes but the swelling and pain eventually got worse to the point where she was unable to walk on it.  She went to the emergency department at [facility] where x-rays were taken and were negative for fracture.  She was diagnosed with an ankle sprain and told to follow-up with me as an outpatient.  She has been icing and elevating the ankle.  She is using crutches for assistance with walking.  She has not had any other treatment.  She is never  injured this ankle before.  The patient denies any fevers, chills, sweats, recent illnesses, numbness, tingling, weakness, or pain elsewhere at this time.

## 2023-10-25 NOTE — PLAN OF CARE
Goal Outcome Evaluation:      Plan of Care Reviewed With: patient progressing.     Pt is AXOX4. Pt c/o pain on her abdominal area well controlled with her current pain medication. Surgical incision is intact and open to air. Abdominal binder is on. Pt was able to sleep a little bit. Educated pt on effects of pain medication like constipation. Pt has no abdominal distention.VSS. Will continue with plan of care.

## 2023-10-26 ENCOUNTER — MYC MEDICAL ADVICE (OUTPATIENT)
Dept: ENDOCRINOLOGY | Facility: CLINIC | Age: 43
End: 2023-10-26
Payer: COMMERCIAL

## 2023-10-26 ENCOUNTER — PATIENT OUTREACH (OUTPATIENT)
Dept: ENDOCRINOLOGY | Facility: CLINIC | Age: 43
End: 2023-10-26
Payer: COMMERCIAL

## 2023-10-26 RX ORDER — OXYCODONE HYDROCHLORIDE 5 MG/1
5 TABLET ORAL EVERY 6 HOURS PRN
Qty: 15 TABLET | Refills: 0 | Status: ON HOLD | OUTPATIENT
Start: 2023-10-26 | End: 2023-10-31

## 2023-10-26 NOTE — PROGRESS NOTES
Patient reported low fluid intake.  Encouraged patient to strive for 48-64 ounces of fluid to help alleviate constipation and to stay hydrated.  Patient was discharged yesterday.  States her pain medication was not sent to her pharmacy.  Patient also did not receive any paper prescriptions for pain medication.    Dr. Roth notified and will send in a prescription for patient's pain medication.

## 2023-10-29 ENCOUNTER — MYC MEDICAL ADVICE (OUTPATIENT)
Dept: SURGERY | Facility: CLINIC | Age: 43
End: 2023-10-29
Payer: COMMERCIAL

## 2023-10-30 ENCOUNTER — APPOINTMENT (OUTPATIENT)
Dept: CT IMAGING | Facility: CLINIC | Age: 43
End: 2023-10-30
Attending: EMERGENCY MEDICINE
Payer: COMMERCIAL

## 2023-10-30 ENCOUNTER — HOSPITAL ENCOUNTER (OUTPATIENT)
Facility: CLINIC | Age: 43
Setting detail: OBSERVATION
Discharge: HOME | End: 2023-11-02
Attending: EMERGENCY MEDICINE | Admitting: SURGERY
Payer: COMMERCIAL

## 2023-10-30 ENCOUNTER — CARE COORDINATION (OUTPATIENT)
Dept: ENDOCRINOLOGY | Facility: CLINIC | Age: 43
End: 2023-10-30

## 2023-10-30 DIAGNOSIS — K91.89 ILEUS FOLLOWING GASTROINTESTINAL SURGERY (H): ICD-10-CM

## 2023-10-30 DIAGNOSIS — Z98.84 S/P BARIATRIC SURGERY: ICD-10-CM

## 2023-10-30 DIAGNOSIS — R21 RASH: ICD-10-CM

## 2023-10-30 DIAGNOSIS — E56.9 VITAMIN DEFICIENCY: ICD-10-CM

## 2023-10-30 DIAGNOSIS — R10.9 ABDOMINAL PAIN, UNSPECIFIED ABDOMINAL LOCATION: Primary | ICD-10-CM

## 2023-10-30 DIAGNOSIS — K90.9 INTESTINAL MALABSORPTION, UNSPECIFIED TYPE: ICD-10-CM

## 2023-10-30 DIAGNOSIS — G89.18 ACUTE POST-OPERATIVE PAIN: ICD-10-CM

## 2023-10-30 DIAGNOSIS — E21.3 HYPERPARATHYROIDISM, UNSPECIFIED (H): ICD-10-CM

## 2023-10-30 DIAGNOSIS — K56.7 ILEUS FOLLOWING GASTROINTESTINAL SURGERY (H): ICD-10-CM

## 2023-10-30 LAB
ALBUMIN SERPL BCG-MCNC: 4.1 G/DL (ref 3.5–5.2)
ALP SERPL-CCNC: 102 U/L (ref 35–104)
ALT SERPL W P-5'-P-CCNC: 24 U/L (ref 0–50)
ANION GAP SERPL CALCULATED.3IONS-SCNC: 13 MMOL/L (ref 7–15)
AST SERPL W P-5'-P-CCNC: 22 U/L (ref 0–45)
BASOPHILS # BLD AUTO: 0.1 10E3/UL (ref 0–0.2)
BASOPHILS NFR BLD AUTO: 1 %
BILIRUB SERPL-MCNC: 0.5 MG/DL
BUN SERPL-MCNC: 13.8 MG/DL (ref 6–20)
CALCIUM SERPL-MCNC: 9.1 MG/DL (ref 8.6–10)
CHLORIDE SERPL-SCNC: 101 MMOL/L (ref 98–107)
CREAT SERPL-MCNC: 0.56 MG/DL (ref 0.51–0.95)
CRP SERPL-MCNC: 5.48 MG/L
DEPRECATED HCO3 PLAS-SCNC: 26 MMOL/L (ref 22–29)
EGFRCR SERPLBLD CKD-EPI 2021: >90 ML/MIN/1.73M2
EOSINOPHIL # BLD AUTO: 0.2 10E3/UL (ref 0–0.7)
EOSINOPHIL NFR BLD AUTO: 3 %
ERYTHROCYTE [DISTWIDTH] IN BLOOD BY AUTOMATED COUNT: 11.8 % (ref 10–15)
GLUCOSE SERPL-MCNC: 94 MG/DL (ref 70–99)
HCT VFR BLD AUTO: 36.2 % (ref 35–47)
HGB BLD-MCNC: 12.4 G/DL (ref 11.7–15.7)
HOLD SPECIMEN: NORMAL
HOLD SPECIMEN: NORMAL
IMM GRANULOCYTES # BLD: 0 10E3/UL
IMM GRANULOCYTES NFR BLD: 1 %
LACTATE SERPL-SCNC: 0.9 MMOL/L (ref 0.7–2)
LIPASE SERPL-CCNC: 119 U/L (ref 13–60)
LYMPHOCYTES # BLD AUTO: 2.4 10E3/UL (ref 0.8–5.3)
LYMPHOCYTES NFR BLD AUTO: 34 %
MCH RBC QN AUTO: 33 PG (ref 26.5–33)
MCHC RBC AUTO-ENTMCNC: 34.3 G/DL (ref 31.5–36.5)
MCV RBC AUTO: 96 FL (ref 78–100)
MONOCYTES # BLD AUTO: 0.6 10E3/UL (ref 0–1.3)
MONOCYTES NFR BLD AUTO: 8 %
NEUTROPHILS # BLD AUTO: 3.9 10E3/UL (ref 1.6–8.3)
NEUTROPHILS NFR BLD AUTO: 53 %
NRBC # BLD AUTO: 0 10E3/UL
NRBC BLD AUTO-RTO: 0 /100
PLATELET # BLD AUTO: 346 10E3/UL (ref 150–450)
POTASSIUM SERPL-SCNC: 3.8 MMOL/L (ref 3.4–5.3)
PROT SERPL-MCNC: 7.1 G/DL (ref 6.4–8.3)
RBC # BLD AUTO: 3.76 10E6/UL (ref 3.8–5.2)
SODIUM SERPL-SCNC: 140 MMOL/L (ref 135–145)
WBC # BLD AUTO: 7.2 10E3/UL (ref 4–11)

## 2023-10-30 PROCEDURE — 83605 ASSAY OF LACTIC ACID: CPT | Performed by: EMERGENCY MEDICINE

## 2023-10-30 PROCEDURE — 74177 CT ABD & PELVIS W/CONTRAST: CPT | Mod: 26 | Performed by: RADIOLOGY

## 2023-10-30 PROCEDURE — 36415 COLL VENOUS BLD VENIPUNCTURE: CPT | Performed by: EMERGENCY MEDICINE

## 2023-10-30 PROCEDURE — 250N000011 HC RX IP 250 OP 636: Performed by: EMERGENCY MEDICINE

## 2023-10-30 PROCEDURE — 99285 EMERGENCY DEPT VISIT HI MDM: CPT | Performed by: EMERGENCY MEDICINE

## 2023-10-30 PROCEDURE — 74177 CT ABD & PELVIS W/CONTRAST: CPT

## 2023-10-30 PROCEDURE — 99285 EMERGENCY DEPT VISIT HI MDM: CPT | Mod: 25 | Performed by: EMERGENCY MEDICINE

## 2023-10-30 PROCEDURE — 80053 COMPREHEN METABOLIC PANEL: CPT | Performed by: EMERGENCY MEDICINE

## 2023-10-30 PROCEDURE — 86140 C-REACTIVE PROTEIN: CPT | Performed by: EMERGENCY MEDICINE

## 2023-10-30 PROCEDURE — 83690 ASSAY OF LIPASE: CPT | Performed by: EMERGENCY MEDICINE

## 2023-10-30 PROCEDURE — 85025 COMPLETE CBC W/AUTO DIFF WBC: CPT | Performed by: EMERGENCY MEDICINE

## 2023-10-30 RX ORDER — IOPAMIDOL 755 MG/ML
85 INJECTION, SOLUTION INTRAVASCULAR ONCE
Status: COMPLETED | OUTPATIENT
Start: 2023-10-30 | End: 2023-10-30

## 2023-10-30 RX ADMIN — IOPAMIDOL 85 ML: 755 INJECTION, SOLUTION INTRAVENOUS at 22:26

## 2023-10-30 ASSESSMENT — ACTIVITIES OF DAILY LIVING (ADL)
ADLS_ACUITY_SCORE: 33
ADLS_ACUITY_SCORE: 35

## 2023-10-30 NOTE — ED TRIAGE NOTES
Recent surgery and now increasing abdominal pain. Also, feels SOA. Directed to ED by provider.     Triage Assessment (Adult)       Row Name 10/30/23 9762          Triage Assessment    Airway WDL WDL        Respiratory WDL    Respiratory WDL WDL        Skin Circulation/Temperature WDL    Skin Circulation/Temperature WDL WDL        Cardiac WDL    Cardiac WDL WDL

## 2023-10-30 NOTE — TELEPHONE ENCOUNTER
Called patient to let her know Dr Roth would like her to come to the [place] ED. Patient verbalized understanding.

## 2023-10-30 NOTE — ED PROVIDER NOTES
[place] EMERGENCY DEPARTMENT ([facility])    10/30/23       History     Chief Complaint   Patient presents with    Post-op Problem     HPI  Juanis Sparks is a 43 year old female with PMH of anemia, mild protein malnutrition, hyperparathyroidism, GERD with esophagitis and osteoporosis presents to the ED for post-op problem. As of this morning having dry heaves, unable to eat/drink, rash, hot/cold flashes, lightheadedness/dizziness, SOB, also no BM since last Saturday. She reports taking dulcolax past 2 days. No drains in. She also reports bumps near incision site that leak clear liquid. She reports having yogurt last night, been on liquids for past few weeks. She has not been feeling hungry, contributing to lack of PO. Discharged Wednesday (5 days ago), started noticing rash Thursday morning (4 days ago).     Per Epic Review:   Patricia has a history of previous duodenal switch (converted from DEVON due to bile reflux) who has had chronic intermittent abdominal pain in conjunction with poor PO intake and associated malnutrition. Patient was seen in clinic and it was recommended she undergo exploratory laparotomy and lysis of adhesions.     Patient underwent exploratory laparotomy, redo enteroenterostomy, closure of mesenteric defect, and lysis of adhesions by Dr Roth (10/23/23). The patient tolerated the procedure well. The patient's diet was slowly advanced to full liquid diet as bowel function returned. Pain was controlled with oral pain medication and the patient was able to ambulate and void without difficulty. The patient received appropriate education post operatively. On POD #2 the patient was discharged to home.    Past Medical History  Past Medical History:   Diagnosis Date    Anemia     Gastroesophageal reflux disease with esophagitis     Hyperparathyroidism (H24)     Mild protein malnutrition (H24)     Osteoporosis     Postsurgical malabsorption      Past Surgical History:   Procedure  "Laterality Date    BYPASS GASTRIC DUODENAL SWITCH N/A 10/23/2023    Procedure: small bowel resection; redo enteroenterostomy  and closure of mesenteric defect;  Surgeon: Doyle Roth MD;  Location: [facility] OR    ESOPHAGOSCOPY, GASTROSCOPY, DUODENOSCOPY (EGD), COMBINED N/A 06/14/2023    Procedure: ESOPHAGOGASTRODUODENOSCOPY, WITH BIOPSY;  Surgeon: Doyle Roth MD;  Location: [facility] GI    GASTRECTOMY, SLEEVE, LAPAROSCOPIC, WITH DUODENAL SWITCH N/A 03/29/2016    Dr. Robin Ramos, Ethel BARILLAS; loop DS with 300cm efferent; 40Fr sleeve.    LAPAROTOMY, LYSIS ADHESIONS, COMBINED N/A 10/23/2023    Procedure: LAPAROTOMY, EXPLORATORY, WITH LYSIS OF ADHESIONS;  Surgeon: Doyle Roth MD;  Location: [facility] OR    [place] LAPAROSCOPIC ENTEROENTEROSTOMY, ANASTOMOSIS OF INTESTINE N/A 06/21/2016    bile reflux and stricture of DI; converted to 50cm alimentary (Mary Ann) and 250 common channel     No current outpatient medications on file.    Allergies   Allergen Reactions    Amoxicillin Hives and Itching    Tetracycline Hives    Cyanoacrylate Rash     Family History  No family history on file.  Social History   Social History     Tobacco Use    Smoking status: Never     Passive exposure: Never    Smokeless tobacco: Never   Substance Use Topics    Alcohol use: Not Currently    Drug use: Not Currently      Past medical history, past surgical history, medications, allergies, family history, and social history were reviewed with the patient. No additional pertinent items.      A complete review of systems was performed with pertinent positives and negatives noted in the HPI, and all other systems negative.    Physical Exam   BP: 109/75  Pulse: 88  Temp: 98.4  F (36.9  C)  Resp: 14  Height: 175.3 cm (5' 9\")  Weight: 62.1 kg (137 lb)  SpO2: 98 %  Physical Exam  Vitals and nursing note reviewed.   Constitutional:       General: She is in acute distress.      Appearance: Normal appearance.   HENT:      Head: Normocephalic.      Nose: " Nose normal.   Eyes:      Pupils: Pupils are equal, round, and reactive to light.   Cardiovascular:      Rate and Rhythm: Normal rate and regular rhythm.   Pulmonary:      Effort: Pulmonary effort is normal.   Abdominal:      General: There is no distension.      Palpations: Abdomen is soft.      Comments: Midline incisional scar with surrounding erythema as noted in the photo below.  Area is exquisitely tender to palpation, particular at the superior aspect.  No drainage.  Mild ecchymosis in the right lower quadrant and periumbilical area.   Musculoskeletal:         General: No deformity. Normal range of motion.      Cervical back: Normal range of motion.   Skin:     General: Skin is warm.   Neurological:      Mental Status: She is alert and oriented to person, place, and time.   Psychiatric:         Mood and Affect: Mood normal.             ED Course, Procedures, & Data           Results for orders placed or performed during the hospital encounter of 10/30/23   CT Abdomen Pelvis w Contrast     Status: None    Narrative    EXAM: CT ABDOMEN PELVIS W CONTRAST  LOCATION: [facility]  DATE: 10/30/2023    INDICATION: diffuse abdominal pain, rash overlying recent surgical incision  COMPARISON: None.  TECHNIQUE: CT scan of the abdomen and pelvis was performed following injection of IV contrast. Multiplanar reformats were obtained. Dose reduction techniques were used.  CONTRAST: iopamidol (ISOVUE 370) solution 85 mL    FINDINGS:   LOWER CHEST: Normal.    HEPATOBILIARY: Benign appearing simple cyst in the high right liver lobe. Absent gallbladder.    PANCREAS: Normal.    SPLEEN: Normal.    ADRENAL GLANDS: Normal.    KIDNEYS/BLADDER: Benign appearing cyst in the right renal upper pole requires no follow-up. Normal left kidney. Bladder is empty.    BOWEL: Postoperative changes of the sleeve gastrectomy with prior duodenal switch procedure. Multiple small bowel anastomoses in the  central abdomen without evidence of a high-grade mechanical obstruction. A few small scattered foci of free air are   present.    LYMPH NODES: Normal.    VASCULATURE: Unremarkable.    PELVIC ORGANS: Peripherally enhancing hypodense focus in the left hemipelvis measuring 4.6 cm on image 170 of series 4. Trace pelvic free fluid.    MUSCULOSKELETAL: Small amount of postoperative fluid along the patient's midline ventral laparotomy incision. No acute osseous findings.      Impression    IMPRESSION:   1.  Extensive postoperative changes without evidence of a high-grade mechanical obstruction. Small amount of scattered pneumoperitoneum likely resolving postoperative finding.    2.  Normal CT appearance of the pancreas. The patient does have a mildly elevated serum lipase level. Please note that imaging findings of pancreatitis can lag the biochemical diagnosis.    3.  Small amount of nonorganized fluid just deep to the patient's midline ventral incision line. No rim-enhancing collections.    4.  4.6 cm cystic focus in the left hemipelvis likely an ovarian cyst or follicle.   US Soft Tissue Abdominal Wall or Lower Back     Status: None    Narrative    Exam: US SOFT TISSUE ABDOMINAL WALL OR LOWER BACK, 10/31/2023 1:50 AM    Indication: Evaluate for superficial fluid collection around incision.    Comparison: CT abdomen/pelvis 10/30/2023    TECHNIQUE: Grayscale and color Doppler sonographic images over the  region of interest obtained.    Findings:   At the midline of the abdomen deep to surgical incision, there is a  2.3 x 1.6 x 1.6 cm heterogenous hypoechoic, avascular collection with  ill-defined margins. Internal septations present. No other abnormality  identified in the area of interest.      Impression    Impression: 2.3 cm collection deep to surgical incision is most likely  to represent hematoma. Seroma given heterogeneity of the collection.  Abscess is considered less likely although cannot be excluded on  imaging.      I have personally reviewed the examination and initial interpretation  and I agree with the findings.    KEYUR BHAKTA MD         SYSTEM ID:  [id number]   XR Gastrografin Challenge     Status: None (Preliminary result)    Impression    RESIDENT PRELIMINARY INTERPRETATION  Impression:   1. Contrast present within the colon. No high-grade small bowel  obstruction.  2. Distended bowel with significant distention of a loop of sigmoid  colon. Findings nonspecific but differential includes sigmoid volvulus  versus ileus.     Results were discussed with Dr. Marion by Dr. Garcia at 7:05 PM on  10/31/2023.   Comprehensive metabolic panel     Status: Normal   Result Value Ref Range    Sodium 140 135 - 145 mmol/L    Potassium 3.8 3.4 - 5.3 mmol/L    Carbon Dioxide (CO2) 26 22 - 29 mmol/L    Anion Gap 13 7 - 15 mmol/L    Urea Nitrogen 13.8 6.0 - 20.0 mg/dL    Creatinine 0.56 0.51 - 0.95 mg/dL    GFR Estimate >90 >60 mL/min/1.73m2    Calcium 9.1 8.6 - 10.0 mg/dL    Chloride 101 98 - 107 mmol/L    Glucose 94 70 - 99 mg/dL    Alkaline Phosphatase 102 35 - 104 U/L    AST 22 0 - 45 U/L    ALT 24 0 - 50 U/L    Protein Total 7.1 6.4 - 8.3 g/dL    Albumin 4.1 3.5 - 5.2 g/dL    Bilirubin Total 0.5 <=1.2 mg/dL   Lipase     Status: Abnormal   Result Value Ref Range    Lipase 119 (H) 13 - 60 U/L   Lactic acid whole blood     Status: Normal   Result Value Ref Range    Lactic Acid 0.9 0.7 - 2.0 mmol/L   [facility] [place] Draw     Status: None    Narrative    The following orders were created for panel order [facility] Draw.  Procedure                               Abnormality         Status                     ---------                               -----------         ------                     Extra Blue Top Tube[[id number]]                              Final result               Extra Red Top Tube[[id number]]                               Final result                 Please view results for these tests on the individual orders.   CBC with  platelets and differential     Status: Abnormal   Result Value Ref Range    WBC Count 7.2 4.0 - 11.0 10e3/uL    RBC Count 3.76 (L) 3.80 - 5.20 10e6/uL    Hemoglobin 12.4 11.7 - 15.7 g/dL    Hematocrit 36.2 35.0 - 47.0 %    MCV 96 78 - 100 fL    MCH 33.0 26.5 - 33.0 pg    MCHC 34.3 31.5 - 36.5 g/dL    RDW 11.8 10.0 - 15.0 %    Platelet Count 346 150 - 450 10e3/uL    % Neutrophils 53 %    % Lymphocytes 34 %    % Monocytes 8 %    % Eosinophils 3 %    % Basophils 1 %    % Immature Granulocytes 1 %    NRBCs per 100 WBC 0 <1 /100    Absolute Neutrophils 3.9 1.6 - 8.3 10e3/uL    Absolute Lymphocytes 2.4 0.8 - 5.3 10e3/uL    Absolute Monocytes 0.6 0.0 - 1.3 10e3/uL    Absolute Eosinophils 0.2 0.0 - 0.7 10e3/uL    Absolute Basophils 0.1 0.0 - 0.2 10e3/uL    Absolute Immature Granulocytes 0.0 <=0.4 10e3/uL    Absolute NRBCs 0.0 10e3/uL   Extra Blue Top Tube     Status: None   Result Value Ref Range    Hold Specimen JIC    Extra Red Top Tube     Status: None   Result Value Ref Range    Hold Specimen JIC    CRP inflammation     Status: Abnormal   Result Value Ref Range    CRP Inflammation 5.48 (H) <5.00 mg/L   CBC with platelets differential     Status: Abnormal    Narrative    The following orders were created for panel order CBC with platelets differential.  Procedure                               Abnormality         Status                     ---------                               -----------         ------                     CBC with platelets and d...[[id number]]  Abnormal            Final result                 Please view results for these tests on the individual orders.     Medications   ceFEPIme (MAXIPIME) 1 g vial to attach to  mL bag for ADULTS or NS 50 mL bag for PEDS (1 g Intravenous $New Bag 10/31/23 7926)   ondansetron (ZOFRAN) injection 4 mg (has no administration in time range)   prochlorperazine (COMPAZINE) injection 5 mg (has no administration in time range)     Or   prochlorperazine (COMPAZINE) tablet  5 mg (has no administration in time range)     Or   prochlorperazine (COMPAZINE) suppository 25 mg (has no administration in time range)   acetaminophen (TYLENOL) tablet 650 mg (has no administration in time range)   oxyCODONE (ROXICODONE) tablet 5 mg (has no administration in time range)   HYDROmorphone (DILAUDID) injection 0.2 mg (has no administration in time range)   lactated ringers infusion ( Intravenous $New Bag 10/31/23 6052)   polyethylene glycol (MIRALAX) Packet 17 g (17 g Oral $Given 10/31/23 0822)   senna-docusate (SENOKOT-S/PERICOLACE) 8.6-50 MG per tablet 1 tablet (1 tablet Oral $Given 10/31/23 0310)   naloxone (NARCAN) injection 0.2 mg (has no administration in time range)     Or   naloxone (NARCAN) injection 0.4 mg (has no administration in time range)     Or   naloxone (NARCAN) injection 0.2 mg (has no administration in time range)     Or   naloxone (NARCAN) injection 0.4 mg (has no administration in time range)   iopamidol (ISOVUE-370) solution 85 mL (85 mLs Intravenous $Given 10/30/23 2226)   sodium chloride (PF) 0.9% PF flush 71 mL (71 mLs Intravenous $Given 10/30/23 2226)   diatrizoate meglumine-sodium (GASTROGRAFIN/GASTROVIEW) 66-10 % solution 120 mL (120 mLs Oral $Given 10/31/23 1044)     Labs Ordered and Resulted from Time of ED Arrival to Time of ED Departure   LIPASE - Abnormal       Result Value    Lipase 119 (*)    CBC WITH PLATELETS AND DIFFERENTIAL - Abnormal    WBC Count 7.2      RBC Count 3.76 (*)     Hemoglobin 12.4      Hematocrit 36.2      MCV 96      MCH 33.0      MCHC 34.3      RDW 11.8      Platelet Count 346      % Neutrophils 53      % Lymphocytes 34      % Monocytes 8      % Eosinophils 3      % Basophils 1      % Immature Granulocytes 1      NRBCs per 100 WBC 0      Absolute Neutrophils 3.9      Absolute Lymphocytes 2.4      Absolute Monocytes 0.6      Absolute Eosinophils 0.2      Absolute Basophils 0.1      Absolute Immature Granulocytes 0.0      Absolute NRBCs 0.0     CRP  INFLAMMATION - Abnormal    CRP Inflammation 5.48 (*)    COMPREHENSIVE METABOLIC PANEL - Normal    Sodium 140      Potassium 3.8      Carbon Dioxide (CO2) 26      Anion Gap 13      Urea Nitrogen 13.8      Creatinine 0.56      GFR Estimate >90      Calcium 9.1      Chloride 101      Glucose 94      Alkaline Phosphatase 102      AST 22      ALT 24      Protein Total 7.1      Albumin 4.1      Bilirubin Total 0.5     LACTIC ACID WHOLE BLOOD - Normal    Lactic Acid 0.9       XR Gastrografin Challenge   Preliminary Result   RESIDENT PRELIMINARY INTERPRETATION   Impression:    1. Contrast present within the colon. No high-grade small bowel   obstruction.   2. Distended bowel with significant distention of a loop of sigmoid   colon. Findings nonspecific but differential includes sigmoid volvulus   versus ileus.       Results were discussed with Dr. Marion by Dr. Garcia at 7:05 PM on   10/31/2023.      US Soft Tissue Abdominal Wall or Lower Back   Final Result   Impression: 2.3 cm collection deep to surgical incision is most likely   to represent hematoma. Seroma given heterogeneity of the collection.   Abscess is considered less likely although cannot be excluded on   imaging.       I have personally reviewed the examination and initial interpretation   and I agree with the findings.      KEYUR BHAKTA MD            SYSTEM ID:  [id number]      CT Abdomen Pelvis w Contrast   Final Result   IMPRESSION:    1.  Extensive postoperative changes without evidence of a high-grade mechanical obstruction. Small amount of scattered pneumoperitoneum likely resolving postoperative finding.      2.  Normal CT appearance of the pancreas. The patient does have a mildly elevated serum lipase level. Please note that imaging findings of pancreatitis can lag the biochemical diagnosis.      3.  Small amount of nonorganized fluid just deep to the patient's midline ventral incision line. No rim-enhancing collections.      4.  4.6 cm cystic focus  in the left hemipelvis likely an ovarian cyst or follicle.             Critical care was not performed.     Medical Decision Making  The patient's presentation was of high complexity (an acute health issue posing potential threat to life or bodily function).    The patient's evaluation involved:  ordering and/or review of 3+ test(s) in this encounter (see separate area of note for details)  discussion of management or test interpretation with another health professional (see separate area of note for details)    The patient's management necessitated moderate risk (prescription drug management including medications given in the ED) and high risk (a decision regarding hospitalization).    Assessment & Plan    Patient with a history of previous duodenal switch which recently underwent exploratory laparotomy, redo of enteroenterostomy, closure of mesenteric defect and lysis of adhesions on 10/23/2023, presents the ED with worsening abdominal pain, rash, and concern for overlying infection    On arrival, patient has normal vital signs.  She appears uncomfortable due to the pain.  She does have a erythematous rash surrounding her surgical incision.  See picture in the physical exam section.  Abdomen is tender surrounding the incision particularly at the superior border.    Labs ordered/reviewed.  Metabolic panel shows normal electrolytes and normal renal function.  CRP is slightly elevated at 5.48.  CBC without significant leukocytosis or anemia.    CT scan shows extensive postop changes without evidence of small bowel obstruction.  There is a small amount of nonorganized fluid just deep to the patient's midline ventral incision, in the area of her pain.    Case discussed with the general surgery team who evaluated the patient in the ED.  Their recommendations are pending.      I have reviewed the nursing notes. I have reviewed the findings, diagnosis, plan and need for follow up with the patient.    Current Discharge  Medication List          Final diagnoses:   Rash   Acute post-operative pain   I, Mini Hughes, am serving as a trained medical scribe to document services personally performed by Drew Mckeon MD based on the provider's statements to me on October 30, 2023.  This document has been checked and approved by the attending provider.    I, Drew Mckeon MD, was physically present and have reviewed and verified the accuracy of this note documented by Mini Hughes, medical scribe.      Drew Mckeon MD  [facility] EMERGENCY DEPARTMENT  10/30/2023     Drew Mckeon DO  10/31/23 1919

## 2023-10-30 NOTE — TELEPHONE ENCOUNTER
Patient states she has been constipated and uncomfortable. She did an enema on Saturday, had small bowel movement on Sunday. Has been having cramping on the right side of her abdomen that radiates to her back. Started having nausea yesterday morning and had dry heaves during the night last night. Will add Miralax with Senna and do Fleets enema if no results. Encouraged patient to push fluids.  Last Thursday began noticing a rash on her abdomen, it has since spread to both sides of her abdomen and along her incision area. It is raised and itchy and feels warm. Patient sent pictures of the rash for Dr Roth to review.

## 2023-10-31 ENCOUNTER — APPOINTMENT (OUTPATIENT)
Dept: ULTRASOUND IMAGING | Facility: CLINIC | Age: 43
End: 2023-10-31
Payer: COMMERCIAL

## 2023-10-31 ENCOUNTER — APPOINTMENT (OUTPATIENT)
Dept: GENERAL RADIOLOGY | Facility: CLINIC | Age: 43
End: 2023-10-31
Payer: COMMERCIAL

## 2023-10-31 PROBLEM — K56.7 ILEUS FOLLOWING GASTROINTESTINAL SURGERY (H): Status: ACTIVE | Noted: 2023-10-31

## 2023-10-31 PROBLEM — K91.89 ILEUS FOLLOWING GASTROINTESTINAL SURGERY (H): Status: ACTIVE | Noted: 2023-10-31

## 2023-10-31 PROCEDURE — 96365 THER/PROPH/DIAG IV INF INIT: CPT

## 2023-10-31 PROCEDURE — 250N000011 HC RX IP 250 OP 636

## 2023-10-31 PROCEDURE — 99223 1ST HOSP IP/OBS HIGH 75: CPT | Mod: 25 | Performed by: SURGERY

## 2023-10-31 PROCEDURE — 74018 RADEX ABDOMEN 1 VIEW: CPT

## 2023-10-31 PROCEDURE — G0378 HOSPITAL OBSERVATION PER HR: HCPCS

## 2023-10-31 PROCEDURE — 76882 US LMTD JT/FCL EVL NVASC XTR: CPT | Mod: RT

## 2023-10-31 PROCEDURE — 250N000013 HC RX MED GY IP 250 OP 250 PS 637

## 2023-10-31 PROCEDURE — 76705 ECHO EXAM OF ABDOMEN: CPT | Mod: 26 | Performed by: RADIOLOGY

## 2023-10-31 PROCEDURE — 96366 THER/PROPH/DIAG IV INF ADDON: CPT

## 2023-10-31 PROCEDURE — 258N000003 HC RX IP 258 OP 636

## 2023-10-31 PROCEDURE — 96361 HYDRATE IV INFUSION ADD-ON: CPT

## 2023-10-31 PROCEDURE — 74018 RADEX ABDOMEN 1 VIEW: CPT | Mod: 26 | Performed by: RADIOLOGY

## 2023-10-31 RX ORDER — NALOXONE HYDROCHLORIDE 0.4 MG/ML
0.2 INJECTION, SOLUTION INTRAMUSCULAR; INTRAVENOUS; SUBCUTANEOUS
Status: DISCONTINUED | OUTPATIENT
Start: 2023-10-31 | End: 2023-11-02 | Stop reason: HOSPADM

## 2023-10-31 RX ORDER — AMOXICILLIN 250 MG
1 CAPSULE ORAL AT BEDTIME
Status: DISCONTINUED | OUTPATIENT
Start: 2023-10-31 | End: 2023-11-02 | Stop reason: HOSPADM

## 2023-10-31 RX ORDER — HYDROMORPHONE HCL IN WATER/PF 6 MG/30 ML
0.2 PATIENT CONTROLLED ANALGESIA SYRINGE INTRAVENOUS
Status: DISCONTINUED | OUTPATIENT
Start: 2023-10-31 | End: 2023-11-02 | Stop reason: HOSPADM

## 2023-10-31 RX ORDER — NALOXONE HYDROCHLORIDE 0.4 MG/ML
0.4 INJECTION, SOLUTION INTRAMUSCULAR; INTRAVENOUS; SUBCUTANEOUS
Status: DISCONTINUED | OUTPATIENT
Start: 2023-10-31 | End: 2023-11-02 | Stop reason: HOSPADM

## 2023-10-31 RX ORDER — CEFEPIME HYDROCHLORIDE 1 G/1
1 INJECTION, POWDER, FOR SOLUTION INTRAMUSCULAR; INTRAVENOUS EVERY 8 HOURS
Status: DISCONTINUED | OUTPATIENT
Start: 2023-10-31 | End: 2023-11-01

## 2023-10-31 RX ORDER — POLYETHYLENE GLYCOL 3350 17 G/17G
17 POWDER, FOR SOLUTION ORAL DAILY
Status: DISCONTINUED | OUTPATIENT
Start: 2023-10-31 | End: 2023-11-02 | Stop reason: HOSPADM

## 2023-10-31 RX ORDER — PROCHLORPERAZINE MALEATE 5 MG
5 TABLET ORAL EVERY 6 HOURS PRN
Status: DISCONTINUED | OUTPATIENT
Start: 2023-10-31 | End: 2023-11-02 | Stop reason: HOSPADM

## 2023-10-31 RX ORDER — OXYCODONE HYDROCHLORIDE 5 MG/1
5 TABLET ORAL EVERY 4 HOURS PRN
Status: DISCONTINUED | OUTPATIENT
Start: 2023-10-31 | End: 2023-11-02 | Stop reason: HOSPADM

## 2023-10-31 RX ORDER — ACETAMINOPHEN 325 MG/1
650 TABLET ORAL EVERY 4 HOURS PRN
Status: DISCONTINUED | OUTPATIENT
Start: 2023-10-31 | End: 2023-11-02 | Stop reason: HOSPADM

## 2023-10-31 RX ORDER — ONDANSETRON 2 MG/ML
4 INJECTION INTRAMUSCULAR; INTRAVENOUS EVERY 6 HOURS PRN
Status: DISCONTINUED | OUTPATIENT
Start: 2023-10-31 | End: 2023-11-02 | Stop reason: HOSPADM

## 2023-10-31 RX ORDER — SODIUM CHLORIDE, SODIUM LACTATE, POTASSIUM CHLORIDE, CALCIUM CHLORIDE 600; 310; 30; 20 MG/100ML; MG/100ML; MG/100ML; MG/100ML
INJECTION, SOLUTION INTRAVENOUS CONTINUOUS
Status: DISCONTINUED | OUTPATIENT
Start: 2023-10-31 | End: 2023-11-02

## 2023-10-31 RX ORDER — HYDROCORTISONE ACETATE PRAMOXINE HCL 2.5; 1 G/100G; G/100G
CREAM TOPICAL 2 TIMES DAILY PRN
Status: CANCELLED | OUTPATIENT
Start: 2023-10-31

## 2023-10-31 RX ORDER — PROCHLORPERAZINE 25 MG
25 SUPPOSITORY, RECTAL RECTAL EVERY 12 HOURS PRN
Status: DISCONTINUED | OUTPATIENT
Start: 2023-10-31 | End: 2023-11-02 | Stop reason: HOSPADM

## 2023-10-31 RX ADMIN — CEFEPIME HYDROCHLORIDE 1 G: 1 INJECTION, POWDER, FOR SOLUTION INTRAMUSCULAR; INTRAVENOUS at 18:16

## 2023-10-31 RX ADMIN — DIATRIZOATE MEGLUMINE AND DIATRIZOATE SODIUM 120 ML: 660; 100 SOLUTION ORAL; RECTAL at 10:44

## 2023-10-31 RX ADMIN — CEFEPIME HYDROCHLORIDE 1 G: 1 INJECTION, POWDER, FOR SOLUTION INTRAMUSCULAR; INTRAVENOUS at 03:10

## 2023-10-31 RX ADMIN — SENNOSIDES AND DOCUSATE SODIUM 1 TABLET: 8.6; 5 TABLET ORAL at 21:04

## 2023-10-31 RX ADMIN — SODIUM CHLORIDE, POTASSIUM CHLORIDE, SODIUM LACTATE AND CALCIUM CHLORIDE: 600; 310; 30; 20 INJECTION, SOLUTION INTRAVENOUS at 15:42

## 2023-10-31 RX ADMIN — POLYETHYLENE GLYCOL 3350 17 G: 17 POWDER, FOR SOLUTION ORAL at 08:22

## 2023-10-31 RX ADMIN — SENNOSIDES AND DOCUSATE SODIUM 1 TABLET: 8.6; 5 TABLET ORAL at 03:10

## 2023-10-31 RX ADMIN — SODIUM CHLORIDE, POTASSIUM CHLORIDE, SODIUM LACTATE AND CALCIUM CHLORIDE: 600; 310; 30; 20 INJECTION, SOLUTION INTRAVENOUS at 04:06

## 2023-10-31 RX ADMIN — CEFEPIME HYDROCHLORIDE 1 G: 1 INJECTION, POWDER, FOR SOLUTION INTRAMUSCULAR; INTRAVENOUS at 12:36

## 2023-10-31 ASSESSMENT — ACTIVITIES OF DAILY LIVING (ADL)
ADLS_ACUITY_SCORE: 35
ADLS_ACUITY_SCORE: 18
ADLS_ACUITY_SCORE: 18
ADLS_ACUITY_SCORE: 35
ADLS_ACUITY_SCORE: 18
ADLS_ACUITY_SCORE: 35

## 2023-10-31 NOTE — CONSULTS
Minimally Invasive Surgery Consult Note  10/31/2023    Reason for consult: Rash around incision  Consult requested by: [facility] ED      ASSESSMENT: 43F with recent revision of her duodenal switch with likely post-operative ileus and rash around incision with possible cellulitis vs superficial surgical site infection. She has implanted mesh from a prior surgery which may potentially communicate with the area of inflammation/fluid collection in the superficial tissue. Labs unremarkable except for a slightly elevated CRP. CT with evidence of a small amount of nonorganized, non-rim-enhancing fluid just deep to the patient's midline ventral incision line. No concern for necrotizing process.    RECOMMENDATIONS:    - Admit to obs for work-up of post-operative ileus  - U/S of soft tissue ordered to further evaluate superficial fluid collection seen on CT  - IV antibiotics  - Pain control  - NPO, mIVF  - No NG tube placement at this time, but will reevaluate if patient becomes nauseous      Discussed with chief resident Dr. Mathew and staff Dr. Bowling.    Juan Mccall MD  Surgery Resident      ADDENDUM: Consult note should be billed as a H&P note. All other information in this note remain unchanged.   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 She presents to the ED today with several days of abdominal pain, not passing gas, and new rash around her wound after surgery. She states that the rash began Thursday morning around her incision and has continued to spread. She has had a small amount of serous drainage from the wound but denies any purulence expressed. The rash is not itchy or painful. She has never had anything like this before. She  denies starting any new medications or ointments, and she does not believe that she has switched laundry detergent or been exposed to any environmental toxins.     She has had continued abdominal pain since her surgery. Her last bowel movement was Saturday 10/28 and she has been unable to have another bowel movement despite taking dulcolax twice and trying an enema. She believes that she has only passed gas once or twice over the last several days. She has had minimal PO intake because she dry heaves after trying anything. She is also having more hiccups than normal. Her UOP has dropped off since she is not drinking much fluids. She has been having 1 urination per day and she doesn't feel that she needs to go otherwise.    Past Medical History:  Past Medical History:   Diagnosis Date    Anemia     Gastroesophageal reflux disease with esophagitis     Hyperparathyroidism (H24)     Mild protein malnutrition (H24)     Osteoporosis     Postsurgical malabsorption        Past Surgical History  Past Surgical History:   Procedure Laterality Date    BYPASS GASTRIC DUODENAL SWITCH N/A 10/23/2023    Procedure: small bowel resection; redo enteroenterostomy  and closure of mesenteric defect;  Surgeon: Doyle Roth MD;  Location: [facility] OR    ESOPHAGOSCOPY, GASTROSCOPY, DUODENOSCOPY (EGD), COMBINED N/A 06/14/2023    Procedure: ESOPHAGOGASTRODUODENOSCOPY, WITH BIOPSY;  Surgeon: Doyle Roth MD;  Location: [facility] GI    GASTRECTOMY, SLEEVE, LAPAROSCOPIC, WITH DUODENAL SWITCH N/A 03/29/2016    Dr. Robin Ramos, Ethel BARILLAS; loop DS with 300cm efferent; 40Fr sleeve.    LAPAROTOMY, LYSIS ADHESIONS, COMBINED N/A 10/23/2023    Procedure: LAPAROTOMY, EXPLORATORY, WITH LYSIS OF ADHESIONS;  Surgeon: Doyle Roth MD;  Location: [facility] OR    OH LAPAROSCOPIC ENTEROENTEROSTOMY, ANASTOMOSIS OF INTESTINE N/A 06/21/2016    bile reflux and stricture of DI; converted to 50cm alimentary (Mary Ann) and 250 common channel       Family  History:  No family history on file.    Social History:  Social History     Social History Narrative    Not on file        Medications:  Current Outpatient Medications   Medication Sig Dispense Refill    acetaminophen (TYLENOL) 325 MG tablet Take 2 tablets (650 mg) by mouth every 4 hours as needed for other (For optimal non-opioid multimodal pain management to improve pain control and physical function.) 40 tablet 0    calcium carbonate (TUMS) 500 MG chewable tablet Take 1 chew tab by mouth 3 times daily as needed for heartburn      Calcium Carbonate-Vitamin D 600-5 MG-MCG TABS Take 1 tablet by mouth 2 times daily      copper gluconate 2 MG tablet Take 1 tablet (2 mg) by mouth daily (Patient not taking: Reported on 10/9/2023) 90 tablet 1    copper gluconate 2 MG tablet Take 1 tablet (2 mg) by mouth daily (Patient not taking: Reported on 10/9/2023) 30 tablet 0    cyanocobalamin (CYANOCOBALAMIN) 1000 MCG/ML injection Inject 1,000 mcg into the muscle every 14 days Next dose 10/15/23      Ferrous Sulfate 324 MG TBEC Take 1 tablet by mouth in the AM and 2 tablets at bedtime.      lipase-protease-amylase (CREON) 13770-97218-573640 units CPEP per EC capsule Take 3 capsules by mouth 3 times daily (with meals) (Patient not taking: Reported on 10/24/2023) 270 capsule 3    multivitamin w/minerals (MULTI-VITAMIN) tablet Take 1 tablet by mouth daily      omeprazole (PRILOSEC) 40 MG DR capsule Take 1 capsule (40 mg) by mouth daily (Patient not taking: Reported on 10/24/2023) 90 capsule 3    oxyCODONE (ROXICODONE) 5 MG tablet Take 1-2 tablets (5-10 mg) by mouth every 6 hours as needed for moderate pain 20 tablet 0    sucralfate (CARAFATE) 1 GM/10ML suspension Take 10 mLs (1 g) by mouth 4 times daily as needed (epigastric pain, regurgitation) (Patient not taking: Reported on 10/24/2023) 414 mL 3    VITAMIN A PO Take 1 tablet by mouth daily      vitamin C (ASCORBIC ACID) 250 MG tablet Take 1 tablet (250 mg) by mouth 3 times daily  (Patient taking differently: Take 1 tablet by mouth in the AM and 2 tablets by mouth at bedtime) 90 tablet 3    Vitamin D 12.5 MCG/0.25ML LIQD Take 2.5 mLs by mouth daily Goal is 5,000 international unit(s) of water miscible vitamin D 3 daily (125mcg) (Patient not taking: Reported on 10/24/2023) 240 mL 3    vitamin D2 (ERGOCALCIFEROL) 19029 units (1250 mcg) capsule Take 50,000 Units by mouth daily      vitamin D3 (CHOLECALCIFEROL) 125 MCG (5000 UT) tablet Take 5,000 Units by mouth daily      vitamin E (TOCOPHEROL) 400 units (180 mg) capsule Take 1 capsule (400 Units) by mouth daily 90 capsule 3    Zinc 100 MG TABS Take 1 tablet (50 mg) by mouth daily 90 tablet 1       Allergies:  Allergies   Allergen Reactions    Amoxicillin Hives and Itching    Tetracycline Hives       Review of Symptoms:  A 10 point review of symptoms has been conducted and is negative except for that mentioned in the above HPI.    Physical Exam:    Temp:  [98.4  F (36.9  C)] 98.4  F (36.9  C)  Pulse:  [88] 88  Resp:  [14] 14  BP: (109)/(75) 109/75  SpO2:  [98 %] 98 %    Gen: NAD, resting comfortably  HEENT: NCAT, sclera anicteric  CV: RRR  Resp: nonlabored respirations, comfortable on room air  Abd: soft, tender around incision, no voluntary guarding, not peritonitic. Minimally distended. No drainage expressed from wound. No drainage seen on overlying gauze pad  Ext: WWP w/o edema  Neuro: no focal deficits    Labs:  CBC RESULTS:   Recent Labs   Lab Test 10/30/23  2032   WBC 7.2   RBC 3.76*   HGB 12.4   HCT 36.2   MCV 96   MCH 33.0   MCHC 34.3   RDW 11.8        CRP Inflammation   Date Value Ref Range Status   10/30/2023 5.48 (H) <5.00 mg/L Final     Lactate 0.9    Last Comprehensive Metabolic Panel:  Sodium   Date Value Ref Range Status   10/30/2023 140 135 - 145 mmol/L Final     Comment:     Reference intervals for this test were updated on 09/26/2023 to more accurately reflect our healthy population. There may be differences in the  flagging of prior results with similar values performed with this method. Interpretation of those prior results can be made in the context of the updated reference intervals.      Potassium   Date Value Ref Range Status   10/30/2023 3.8 3.4 - 5.3 mmol/L Final     Chloride   Date Value Ref Range Status   10/30/2023 101 98 - 107 mmol/L Final     Chloride (External)   Date Value Ref Range Status   06/08/2023 110 (H) 98 - 109 mmol/L Final     Carbon Dioxide (CO2)   Date Value Ref Range Status   10/30/2023 26 22 - 29 mmol/L Final     Anion Gap   Date Value Ref Range Status   10/30/2023 13 7 - 15 mmol/L Final     Glucose   Date Value Ref Range Status   10/30/2023 94 70 - 99 mg/dL Final     GLUCOSE BY METER POCT   Date Value Ref Range Status   10/23/2023 79 70 - 99 mg/dL Final     Urea Nitrogen   Date Value Ref Range Status   10/30/2023 13.8 6.0 - 20.0 mg/dL Final     Creatinine   Date Value Ref Range Status   10/30/2023 0.56 0.51 - 0.95 mg/dL Final     GFR Estimate   Date Value Ref Range Status   10/30/2023 >90 >60 mL/min/1.73m2 Final     Calcium   Date Value Ref Range Status   10/30/2023 9.1 8.6 - 10.0 mg/dL Final     Bilirubin Total   Date Value Ref Range Status   10/30/2023 0.5 <=1.2 mg/dL Final     Alkaline Phosphatase   Date Value Ref Range Status   10/30/2023 102 35 - 104 U/L Final     ALT   Date Value Ref Range Status   10/30/2023 24 0 - 50 U/L Final     Comment:     Reference intervals for this test were updated on 6/12/2023 to more accurately reflect our healthy population. There may be differences in the flagging of prior results with similar values performed with this method. Interpretation of those prior results can be made in the context of the updated reference intervals.       AST   Date Value Ref Range Status   10/30/2023 22 0 - 45 U/L Final     Comment:     Reference intervals for this test were updated on 6/12/2023 to more accurately reflect our healthy population. There may be differences in the  flagging of prior results with similar values performed with this method. Interpretation of those prior results can be made in the context of the updated reference intervals.                   Imaging:  CT Abdomen/Pelvis:  IMPRESSION:   1.  Extensive postoperative changes without evidence of a high-grade mechanical obstruction. Small amount of scattered pneumoperitoneum likely resolving postoperative finding.     2.  Normal CT appearance of the pancreas. The patient does have a mildly elevated serum lipase level. Please note that imaging findings of pancreatitis can lag the biochemical diagnosis.     3.  Small amount of nonorganized fluid just deep to the patient's midline ventral incision line. No rim-enhancing collections.     4.  4.6 cm cystic focus in the left hemipelvis likely an ovarian cyst or follicle.      U/S:   RESIDENT PRELIMINARY INTERPRETATION  Impression: 2.3 cm collection deep to surgical incision is most likely  to represent hematoma versus seroma. Abscess is considered less  likely.

## 2023-10-31 NOTE — PLAN OF CARE
AVSS on RA.  A/Ox4.  Denies pain and nausea.  Tolerating clear liquid diet.  R PIV infusing LR @ 75 ml/hr.  Up with independently.

## 2023-10-31 NOTE — PROVIDER NOTIFICATION
"Patient arrival to observations at 1527    Secure text sent via [employer] to intern Katelyn Marion MD @ 1539    \"Hello, FYI. Patient arrival to observation bed 7 at this time.\"    Addendum: message unable to jeane as delivered. Sent Page via [employer] at 1553 \"Juanis Sparks.  97 Hello, FYI. Patient arrival to observation bed 7 at this time.  Call [phone] if questions.\"     Lucero Khan RN on 10/31/2023 at 3:51 PM      "

## 2023-10-31 NOTE — DISCHARGE SUMMARY
General Surgery Discharge Summary    Juanis Sparks MRN# [id number]   YOB: 1980 Age: 43 year old     Date of Admission:  10/30/2023  Date of Discharge::  11/2/23  Admitting Physician:  Addison Bowling MD  Discharge Physician:  Doyle Roth MD   Primary Care Physician:        Charito Rios          Admission Diagnoses:   Rash [R21]  Acute post-operative pain [G89.18]  Ileus following gastrointestinal surgery (H) [K91.89, K56.7]         Discharge Diagnosis:   Ileus following gastrointestinal surgery (H) [K91.89, K56.7]         Procedures:   None        Non-operative procedures:   None performed          Consultations:   Minimally invasive surgery            Medications Prior to Admission:     Medications Prior to Admission   Medication Sig Dispense Refill Last Dose    acetaminophen (TYLENOL) 325 MG tablet Take 2 tablets (650 mg) by mouth every 4 hours as needed for other (For optimal non-opioid multimodal pain management to improve pain control and physical function.) 40 tablet 0 Past Week    calcium carbonate (TUMS) 500 MG chewable tablet Take 1 chew tab by mouth 3 times daily as needed for heartburn   Past Week    Calcium Carbonate-Vitamin D 600-5 MG-MCG TABS Take 1 tablet by mouth 2 times daily   Past Week    cyanocobalamin (CYANOCOBALAMIN) 1000 MCG/ML injection Inject 1,000 mcg into the muscle every 14 days Next dose 10/15/23   10/8/2023    Ferrous Sulfate 324 MG TBEC Take 1 tablet by mouth in the AM and 2 tablets at bedtime.   Past Week    multivitamin w/minerals (MULTI-VITAMIN) tablet Take 1 tablet by mouth daily   Past Week    VITAMIN A PO Take 1 tablet by mouth daily   Past Week    vitamin C (ASCORBIC ACID) 250 MG tablet Take 1 tablet (250 mg) by mouth 3 times daily (Patient taking differently: Take 1 tablet by mouth in the AM and 2 tablets by mouth at bedtime) 90 tablet 3 Past Week    vitamin D2 (ERGOCALCIFEROL) 01669 units (1250 mcg) capsule Take 50,000 Units by mouth daily   Past Week     vitamin D3 (CHOLECALCIFEROL) 125 MCG (5000 UT) tablet Take 5,000 Units by mouth daily   Past Week    vitamin E (TOCOPHEROL) 400 units (180 mg) capsule Take 1 capsule (400 Units) by mouth daily 90 capsule 3 Past Week    Zinc 100 MG TABS Take 1 tablet (50 mg) by mouth daily 90 tablet 1 Past Week    copper gluconate 2 MG tablet Take 1 tablet (2 mg) by mouth daily (Patient not taking: Reported on 10/9/2023) 90 tablet 1     lipase-protease-amylase (CREON) 35904-77139-623991 units CPEP per EC capsule Take 3 capsules by mouth 3 times daily (with meals) (Patient not taking: Reported on 10/24/2023) 270 capsule 3     omeprazole (PRILOSEC) 40 MG DR capsule Take 1 capsule (40 mg) by mouth daily (Patient not taking: Reported on 10/24/2023) 90 capsule 3     oxyCODONE (ROXICODONE) 5 MG tablet Take 1-2 tablets (5-10 mg) by mouth every 6 hours as needed for moderate pain (Patient not taking: Reported on 10/31/2023) 20 tablet 0 Not Taking    sucralfate (CARAFATE) 1 GM/10ML suspension Take 10 mLs (1 g) by mouth 4 times daily as needed (epigastric pain, regurgitation) (Patient not taking: Reported on 10/24/2023) 414 mL 3     Vitamin D 12.5 MCG/0.25ML LIQD Take 2.5 mLs by mouth daily Goal is 5,000 international unit(s) of water miscible vitamin D 3 daily (125mcg) (Patient not taking: Reported on 10/24/2023) 240 mL 3             Discharge Medications:        Medication List        Started      polyethylene glycol 17 GM/Dose powder  Commonly known as: MIRALAX  17 g, Oral, DAILY PRN     senna-docusate 8.6-50 MG tablet  Commonly known as: SENOKOT-S/PERICOLACE  1 tablet, Oral, AT BEDTIME PRN            ASK your doctor about these medications      copper gluconate 2 MG tablet  2 mg, Oral, DAILY     lipase-protease-amylase [id number] units Cpep per EC capsule  Commonly known as: Creon  3 capsules, Oral, 3 TIMES DAILY WITH MEALS     omeprazole 40 MG DR capsule  Commonly known as: PriLOSEC  40 mg, Oral, DAILY     oxyCODONE 5 MG  "tablet  Commonly known as: ROXICODONE  5-10 mg, Oral, EVERY 6 HOURS PRN     sucralfate 1 GM/10ML suspension  Commonly known as: CARAFATE  1 g, Oral, 4 TIMES DAILY PRN     Vitamin D 12.5 MCG/0.25ML Liqd  2.5 mLs, Oral, DAILY, Goal is 5,000 international unit(s) of water miscible vitamin D 3 daily (125mcg)                    Day of Discharge Exam   BP 91/61 (BP Location: Right arm)   Pulse 59   Temp 98.4  F (36.9  C) (Oral)   Resp 16   Ht 1.753 m (5' 9\")   Wt 62.1 kg (137 lb)   SpO2 100%   BMI 20.23 kg/m      General:  A&Ox3, NAD  Cardio:  RRR  Chest:   Non labored breathing on RA  Abd:   Soft, non-distended, nontender, incision c/d/i with steri-strips  Ext:   WWP          Brief History of Illness:   Juanis Sparks is a 43 year old female with hx GERD and laparoscopic gastric bypass with duodenal switch (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 She presented to the ED on 10/30/2023 with several days of abdominal pain, not passing gas, and new rash around her wound after surgery. Her last bowel movement at the time was on 10/28/23 despite Dulcolax and an enema. She had minimal passage of flatus. Her rash began 10/26/2023 around her incision with serous drainage without purulence.           Hospital Course:   The patient was admitted for observation and evaluation of post-operative ileus. She underwent abdominal imaging (CT, soft tissue U/S, and XR gastrograffin challenge) and laboratory workup. Labs were unremarkable except for a slightly elevated CRP. CT with evidence of a small amount of nonorganized, non-rim-enhancing fluid just deep to the patient's midline ventral incision line. US with a 2.3 collection most likely hematoma or seroma. XR with gastrografin challenge was concerning for sigmoid volvulus. She was managed with multimodal pain control PRN, antiemetics PRN, LR, a bowel regimen. Patient's passage of flatus increased " and she tolerated a full liquid diet without nausea or vomiting. She was able to ambulate and void without difficulty. The patient received appropriate education. The patient was discharged to home with appropriate instructions and follow up. The patient acknowledged understanding and were in agreement with the plan.         Antibiotics Prescribed at Discharge:   None prescribed         Imaging Studies:     Results for orders placed or performed during the hospital encounter of 10/30/23   CT Abdomen Pelvis w Contrast    Narrative    EXAM: CT ABDOMEN PELVIS W CONTRAST  LOCATION: [facility]  DATE: 10/30/2023    INDICATION: diffuse abdominal pain, rash overlying recent surgical incision  COMPARISON: None.  TECHNIQUE: CT scan of the abdomen and pelvis was performed following injection of IV contrast. Multiplanar reformats were obtained. Dose reduction techniques were used.  CONTRAST: iopamidol (ISOVUE 370) solution 85 mL    FINDINGS:   LOWER CHEST: Normal.    HEPATOBILIARY: Benign appearing simple cyst in the high right liver lobe. Absent gallbladder.    PANCREAS: Normal.    SPLEEN: Normal.    ADRENAL GLANDS: Normal.    KIDNEYS/BLADDER: Benign appearing cyst in the right renal upper pole requires no follow-up. Normal left kidney. Bladder is empty.    BOWEL: Postoperative changes of the sleeve gastrectomy with prior duodenal switch procedure. Multiple small bowel anastomoses in the central abdomen without evidence of a high-grade mechanical obstruction. A few small scattered foci of free air are   present.    LYMPH NODES: Normal.    VASCULATURE: Unremarkable.    PELVIC ORGANS: Peripherally enhancing hypodense focus in the left hemipelvis measuring 4.6 cm on image 170 of series 4. Trace pelvic free fluid.    MUSCULOSKELETAL: Small amount of postoperative fluid along the patient's midline ventral laparotomy incision. No acute osseous findings.      Impression    IMPRESSION:    1.  Extensive postoperative changes without evidence of a high-grade mechanical obstruction. Small amount of scattered pneumoperitoneum likely resolving postoperative finding.    2.  Normal CT appearance of the pancreas. The patient does have a mildly elevated serum lipase level. Please note that imaging findings of pancreatitis can lag the biochemical diagnosis.    3.  Small amount of nonorganized fluid just deep to the patient's midline ventral incision line. No rim-enhancing collections.    4.  4.6 cm cystic focus in the left hemipelvis likely an ovarian cyst or follicle.   US Soft Tissue Abdominal Wall or Lower Back    Narrative    Exam: US SOFT TISSUE ABDOMINAL WALL OR LOWER BACK, 10/31/2023 1:50 AM    Indication: Evaluate for superficial fluid collection around incision.    Comparison: CT abdomen/pelvis 10/30/2023    TECHNIQUE: Grayscale and color Doppler sonographic images over the  region of interest obtained.    Findings:   At the midline of the abdomen deep to surgical incision, there is a  2.3 x 1.6 x 1.6 cm heterogenous hypoechoic, avascular collection with  ill-defined margins. Internal septations present. No other abnormality  identified in the area of interest.      Impression    Impression: 2.3 cm collection deep to surgical incision is most likely  to represent hematoma. Seroma given heterogeneity of the collection.  Abscess is considered less likely although cannot be excluded on  imaging.     I have personally reviewed the examination and initial interpretation  and I agree with the findings.    KEYUR BHAKTA MD         SYSTEM ID:  [id number]        Exam: XR ABDOMEN 1 VIEW, 11/1/2023 11:52 AM     Indication: Eval for contrast passage s/p gastrograffin challenge;  eval for colonic/sigmoid dilation     Comparison: 10/31/2023     Findings:   Three-view radiographs of the abdomen demonstrating retained contrast  material admixed with stool within the colon. There is reduced but  persisting  dilated gas-filled distal large bowel loops projecting over  the mid lower abdomen measuring 8 cm in diameter maximally, previously  10 cm. Decreased dilatation of the ascending colon as well. No  pneumatosis or portal venous gas. Stable post surgical changes. Lung  bases are clear.                                                                      Impression:   Retention of enteric contrast material throughout much of the colon.  The dilated sigmoid colon loop in question is reduced in size,  measuring 8 cm in diameter maximally, previously 10 cm. No dilated  small bowel loops are appreciated.     I have personally reviewed the examination and initial interpretation  and I agree with the findings.     ALYSSA HERRON MD (Joe)             Final Pathology Result:   No pathology submitted         Discharge Instructions:     - No lifting greater than 15 pounds for 3-4 weeks after surgery.   - You may shower and get incisions wet starting 48 hrs after surgery but do not scrub incisions or submerge wounds (aka, bath, pool, hot tub, ect.) for 2 weeks. Remove outer dressing (if placed) after 48 hrs from surgery. If dermabond was used, avoid applying any lotions or ointments. If steri-strips were used, they will fall off on their own. You may leave open to air or cover with dry gauze if needed for comfort.  - No driving while taking narcotic pain medications.   - If you develop constipation, take stool softeners such as colace or miralax but stop if you develop diarrhea. Wean yourself off of narcotics.   - Call your primary provider to touch base with them regarding your recent admission.   - If you develop any fever/chills, worsening pain, redness, swelling, or drainage from your wound please call (Clinic [phone]).          Follow-Up:     - Follow up with Dr. Roth in clinic in 1 week after discharge. You should be called to make an appointment within 3 business days. If you are not contacted, call  [phone] to make an appointment        Home Health Care:     Not needed           Discharge Disposition:     Discharged to home      Condition at discharge: Stable

## 2023-11-01 ENCOUNTER — APPOINTMENT (OUTPATIENT)
Dept: GENERAL RADIOLOGY | Facility: CLINIC | Age: 43
End: 2023-11-01
Payer: COMMERCIAL

## 2023-11-01 LAB
ANION GAP SERPL CALCULATED.3IONS-SCNC: 9 MMOL/L (ref 7–15)
BUN SERPL-MCNC: 6.3 MG/DL (ref 6–20)
CALCIUM SERPL-MCNC: 8.6 MG/DL (ref 8.6–10)
CHLORIDE SERPL-SCNC: 106 MMOL/L (ref 98–107)
CREAT SERPL-MCNC: 0.55 MG/DL (ref 0.51–0.95)
DEPRECATED HCO3 PLAS-SCNC: 26 MMOL/L (ref 22–29)
EGFRCR SERPLBLD CKD-EPI 2021: >90 ML/MIN/1.73M2
ERYTHROCYTE [DISTWIDTH] IN BLOOD BY AUTOMATED COUNT: 11.5 % (ref 10–15)
GLUCOSE SERPL-MCNC: 92 MG/DL (ref 70–99)
HCT VFR BLD AUTO: 32.2 % (ref 35–47)
HGB BLD-MCNC: 10.7 G/DL (ref 11.7–15.7)
MCH RBC QN AUTO: 33 PG (ref 26.5–33)
MCHC RBC AUTO-ENTMCNC: 33.2 G/DL (ref 31.5–36.5)
MCV RBC AUTO: 99 FL (ref 78–100)
PATH REPORT.COMMENTS IMP SPEC: NORMAL
PATH REPORT.COMMENTS IMP SPEC: NORMAL
PATH REPORT.FINAL DX SPEC: NORMAL
PATH REPORT.GROSS SPEC: NORMAL
PATH REPORT.MICROSCOPIC SPEC OTHER STN: NORMAL
PATH REPORT.RELEVANT HX SPEC: NORMAL
PHOTO IMAGE: NORMAL
PLATELET # BLD AUTO: 303 10E3/UL (ref 150–450)
POTASSIUM SERPL-SCNC: 4.3 MMOL/L (ref 3.4–5.3)
RBC # BLD AUTO: 3.24 10E6/UL (ref 3.8–5.2)
SODIUM SERPL-SCNC: 141 MMOL/L (ref 135–145)
WBC # BLD AUTO: 5.9 10E3/UL (ref 4–11)

## 2023-11-01 PROCEDURE — 74018 RADEX ABDOMEN 1 VIEW: CPT | Mod: 26 | Performed by: RADIOLOGY

## 2023-11-01 PROCEDURE — 250N000011 HC RX IP 250 OP 636

## 2023-11-01 PROCEDURE — 80048 BASIC METABOLIC PNL TOTAL CA: CPT

## 2023-11-01 PROCEDURE — 74018 RADEX ABDOMEN 1 VIEW: CPT

## 2023-11-01 PROCEDURE — G0378 HOSPITAL OBSERVATION PER HR: HCPCS

## 2023-11-01 PROCEDURE — 96366 THER/PROPH/DIAG IV INF ADDON: CPT

## 2023-11-01 PROCEDURE — 36415 COLL VENOUS BLD VENIPUNCTURE: CPT

## 2023-11-01 PROCEDURE — 85027 COMPLETE CBC AUTOMATED: CPT

## 2023-11-01 PROCEDURE — 258N000003 HC RX IP 258 OP 636

## 2023-11-01 PROCEDURE — 250N000013 HC RX MED GY IP 250 OP 250 PS 637

## 2023-11-01 RX ORDER — CALCIUM CARBONATE 500 MG/1
500 TABLET, CHEWABLE ORAL 3 TIMES DAILY PRN
Status: DISCONTINUED | OUTPATIENT
Start: 2023-11-01 | End: 2023-11-02 | Stop reason: HOSPADM

## 2023-11-01 RX ADMIN — SODIUM CHLORIDE, POTASSIUM CHLORIDE, SODIUM LACTATE AND CALCIUM CHLORIDE: 600; 310; 30; 20 INJECTION, SOLUTION INTRAVENOUS at 06:03

## 2023-11-01 RX ADMIN — SODIUM CHLORIDE, POTASSIUM CHLORIDE, SODIUM LACTATE AND CALCIUM CHLORIDE: 600; 310; 30; 20 INJECTION, SOLUTION INTRAVENOUS at 18:50

## 2023-11-01 RX ADMIN — SENNOSIDES AND DOCUSATE SODIUM 1 TABLET: 8.6; 5 TABLET ORAL at 20:59

## 2023-11-01 RX ADMIN — POLYETHYLENE GLYCOL 3350 17 G: 17 POWDER, FOR SOLUTION ORAL at 08:08

## 2023-11-01 RX ADMIN — CEFEPIME HYDROCHLORIDE 1 G: 1 INJECTION, POWDER, FOR SOLUTION INTRAMUSCULAR; INTRAVENOUS at 02:25

## 2023-11-01 ASSESSMENT — ACTIVITIES OF DAILY LIVING (ADL)
ADLS_ACUITY_SCORE: 18

## 2023-11-01 NOTE — UTILIZATION REVIEW
"Admission Status; Secondary Review Determination     Admission Date: 10/30/2023  7:57 PM       Under the authority of the Utilization Management Committee, the utilization review process indicated a secondary review on the above patient.  The review outcome is based on review of the medical records, discussions with staff, and applying clinical experience noted on the date of the review.          (x) Observation Status Appropriate - This patient does not meet hospital inpatient criteria and is placed in observation status. If this patient's primary payer is Medicare and was admitted as an inpatient, Condition Code 44 should be used and patient status changed to \"observation\".       RATIONALE FOR DETERMINATION      Brief clinical presentation, information copied from the chart, abbreviated and edited for relevant content:       Juanis Sparks is a 43 year old female was admitted to observation for evaluation of post-operative ileus. She underwent abdominal imaging (CT, soft tissue U/S, and XR gastrograffin challenge) and laboratory workup. Labs were unremarkable except for a slightly elevated CRP. CT with evidence of a small amount of nonorganized, non-rim-enhancing fluid just deep to the patient's midline ventral incision line. US with a 2.3 collection most likely hematoma or seroma. XR with gastrografin challenge was concerning for sigmoid volvulus. She was managed with multimodal pain control, antiemetics PRN, LR, a bowel regimen. Pain was controlled with oral pain medication and the patient was able to ambulate and void without difficulty. Likely to discharge today or in AM.    The severity of illness, intensity of cares provided, risk for adverse outcome, and expected LOS make the care appropriate for observation.       The information on this document is developed by the utilization review team in order for the business office to ensure compliance.  This only denotes the appropriateness of proper admission " status and does not reflect the quality of care rendered.         The definitions of Inpatient Status and Observation Status used in making the determination above are those provided in the CMS Coverage Manual, Chapter 1 and Chapter 6, section 70.4.      Sincerely,     Sera Palacios MD   Utilization Review/ Case Management  [facility].

## 2023-11-01 NOTE — PLAN OF CARE
"Goal Outcome Evaluation:      Plan of Care Reviewed With: patient    Overall Patient Progress: no change    BP 92/54 (BP Location: Right arm)   Pulse 52   Temp 98.2  F (36.8  C) (Oral)   Resp 16   Ht 1.753 m (5' 9\")   Wt 62.1 kg (137 lb)   SpO2 96%   BMI 20.23 kg/m        Shift [id number]      Neuro: A/Ox4  Cardiac: denies chest pain  Respiratory: denies SOB, stable on room air  GI/: hypoactive bowel sounds, not passing gas, denies nausea  Diet/appetite: tolerating clear liquid diet  Activity: up independently in room and hallway  Pain: denies pain  Skin: midline abdominal incision with rash  LDA's: PIV in L arm, LR running at 75           "

## 2023-11-01 NOTE — PLAN OF CARE
"Goal Outcome Evaluation:  Patient alert and oriented x 4, denies pain at this time, stated that she had had a small bowel movement this am. Bowel sounds are hypoactive. BP 92/54 (BP Location: Right arm)   Pulse 52   Temp 98.2  F (36.8  C) (Oral)   Resp 16   Ht 1.753 m (5' 9\")   Wt 62.1 kg (137 lb)   SpO2 96%   BMI 20.23 kg/m                          "

## 2023-11-01 NOTE — PROGRESS NOTES
Resident/Fellow Attestation   I, Shari Malave MD, was present with the medical/SHALONDA student who participated in the service and in the documentation of the note.  I have verified the history and personally performed the physical exam and medical decision making.  I agree with the assessment and plan of care as documented in the note.      Patient had bowel movement after gastrografin challenge, however is still not passing gas. There is some concern for ileus vs sigmoid volvulus on most recent abdominal XR, although patient is without abdominal pain or distension and is hemodynamically stable with fairly unremarkable labs. Will obtain repeat AXR today to evaluate for contrast in the sigmoid. Continue CLD as tolerated, recommend frequent ambulation.    Shari Malave MD  PGY1  Date of Service (when I saw the patient): 11/01/23    [facility]    Progress Note - MIS Service       Date of Admission:  10/30/2023    Assessment & Plan: Surgery   Juanis Sparks is a 43 year old female admitted on 10/30/2023. She has a history of laparoscopic gastric bypass with duodenal switch in 2016 with recent revision on 10/23/23. Patient presented to the emergency department 10/30 for continued abdominal pain, constipation, and rash to her incision site. Vital signs stable since arrival. Admission labs were unremarkable except for a slightly elevated CRP. Found to have a 2.3 cm fluid collection under incision most likely hematoma. XR with gastrografin challenge was concerning for potential sigmoid volvulus, but clinically is unlikely given the timing and the fact the patient is relatively asymptomatic. Her pain is well controlled and she has had a bowel movement with Miralax and Senna. Denies passing flatus. Abdominal erythema improving and not concerning for cellulitis.     Plan:  - Repeat XR to evaluate for contrast passage into sigmoid colon  - CLD as tolerated  - Multimodal pain  control PRN  - Antiemetics PRN  - Wean IVF as patient increases PO intake  - Continue bowel regimen  - Discontinue IV cefepime  - Encourage frequent ambulation        Diet: Clear Liquid Diet    DVT Prophylaxis: Low Risk/Ambulatory with no VTE prophylaxis indicated  Anthony Catheter: Not present  Lines: None     Drains: None     Cardiac Monitoring: None  Code Status:   Full code    Clinically Significant Risk Factors Present on Admission                                  Disposition Plan     Expected Discharge Date: 11/01/2023                 The patient's care was discussed with the Chief Resident/Fellow.    Miko Shafer, MS4    [facility]  Non-urgent messages: Securely message with [employer] (more info)  Text page via [facility] Paging/Directory     ______________________________________________________________________    Interval History   No acute overnight events. Patient is doing well. Her pain is controlled. She had a small bowel movement and voided on her own this morning. Denies passing flatus. Patient has been tolerating her clear liquid diet with no nausea or vomiting. She has been ambulating without difficulty.     Physical Exam   Vital Signs: Temp: 98.2  F (36.8  C) Temp src: Oral BP: 92/54 Pulse: 52   Resp: 16 SpO2: 96 % O2 Device: None (Room air)    Weight: 137 lbs 0 oz  Intake/Output Summary (Last 24 hours) at 11/1/2023 0617  Last data filed at 10/31/2023 2200  Gross per 24 hour   Intake --   Output 800 ml   Net -800 ml     General Appearance: A&O x4, NAD  Respiratory: non-labored respirations  Cardiovascular: regular rate and rhythm  GI: abdomen soft, nontender, nondistended, surgical incision covered with steri-strips c/d/i with minimal surrounding erythema, no discharge   Skin: Warm and well perfused        Data   Imaging results reviewed over the past 24 hrs:   Recent Results (from the past 24 hour(s))   XR Gastrografin Challenge    Narrative    Exam: XR  GASTROGRAFIN CHALLENGE, 10/31/2023 6:18 PM    Indication: Small Bowel Obstruction    Comparison: CT abdomen and pelvis 10/30/2023    Findings:   AP portable supine images of the abdomen were obtained 5 hours post  Gastrografin ingestion. Dilated loops of peripheral, contrast-filled  colon with central, noncontrast filled dilated loops of colon within  the central pelvis measuring up to 9.9 cm, increased compared to  10/30/2023. No pneumatosis or portal venous gas. Multiple mildly gas  distended loops of small bowel in the left upper quadrant. No  pneumatosis or portal venous gas.      Impression    Impression:   1. Contrast present within the colon. No high-grade small bowel  obstruction.  2. Distended bowel with significant distention of a loop of sigmoid  colon. Findings nonspecific but differential includes sigmoid volvulus  versus ileus.     Results were discussed with Dr. Marion by Dr. Garcia at 7:05 PM on  10/31/2023.    I have personally reviewed the examination and initial interpretation  and I agree with the findings.    SINTIA NOGUERA MD         SYSTEM ID:  [id number]

## 2023-11-01 NOTE — PHARMACY-ADMISSION MEDICATION HISTORY
Pharmacy Intern Admission Medication History    Admission medication history is complete. The information provided in this note is only as accurate as the sources available at the time of the update.    Information Source(s): Patient and CareEverywhere/SureScripts via in-person    Pertinent Information: Pt was a good medication historian and manages medications on own at home. .   -Has not started liquid vitamin D yet d/t supply difficulty  -Not taking sucralfate, creon, omeprazole. Per chart review, have not been discontinued  -Not taking copper gluconate d/t supply difficulty  -Recent fill for gabapentin, reports not taking at home (makes her drowsy)    Changes made to PTA medication list:  Added: None  Deleted:   Duplicate copper gluconate  Changed: None    Allergies reviewed with patient and updates made in EHR: yes    Medication History Completed By: Henri Brice 10/31/2023 7:16 PM      Prior to Admission medications    Medication Sig Last Dose Taking? Auth Provider Long Term End Date   acetaminophen (TYLENOL) 325 MG tablet Take 2 tablets (650 mg) by mouth every 4 hours as needed for other (For optimal non-opioid multimodal pain management to improve pain control and physical function.) Past Week Yes Juan Nelson MD     calcium carbonate (TUMS) 500 MG chewable tablet Take 1 chew tab by mouth 3 times daily as needed for heartburn Past Week Yes Reported, Patient     Calcium Carbonate-Vitamin D 600-5 MG-MCG TABS Take 1 tablet by mouth 2 times daily Past Week Yes Reported, Patient     cyanocobalamin (CYANOCOBALAMIN) 1000 MCG/ML injection Inject 1,000 mcg into the muscle every 14 days Next dose 10/15/23 10/8/2023 Yes Reported, Patient     Ferrous Sulfate 324 MG TBEC Take 1 tablet by mouth in the AM and 2 tablets at bedtime. Past Week Yes Reported, Patient     multivitamin w/minerals (MULTI-VITAMIN) tablet Take 1 tablet by mouth daily Past Week Yes Reported, Patient No    VITAMIN A PO Take 1 tablet by mouth  daily Past Week Yes Reported, Patient     vitamin C (ASCORBIC ACID) 250 MG tablet Take 1 tablet (250 mg) by mouth 3 times daily  Patient taking differently: Take 1 tablet by mouth in the AM and 2 tablets by mouth at bedtime Past Week Yes Britta Mcgee NP     vitamin D2 (ERGOCALCIFEROL) 72290 units (1250 mcg) capsule Take 50,000 Units by mouth daily Past Week Yes Unknown, Entered By History     vitamin D3 (CHOLECALCIFEROL) 125 MCG (5000 UT) tablet Take 5,000 Units by mouth daily Past Week Yes Reported, Patient     vitamin E (TOCOPHEROL) 400 units (180 mg) capsule Take 1 capsule (400 Units) by mouth daily Past Week Yes Britta Mcgee NP     Zinc 100 MG TABS Take 1 tablet (50 mg) by mouth daily Past Week Yes Britta Mcgee NP     copper gluconate 2 MG tablet Take 1 tablet (2 mg) by mouth daily  Patient not taking: Reported on 10/9/2023   Britta Mcgee NP     lipase-protease-amylase (CREON) 49295-18960-720387 units CPEP per EC capsule Take 3 capsules by mouth 3 times daily (with meals)  Patient not taking: Reported on 10/24/2023   Britta Mcgee NP     omeprazole (PRILOSEC) 40 MG DR capsule Take 1 capsule (40 mg) by mouth daily  Patient not taking: Reported on 10/24/2023   Britta Mcgee NP     oxyCODONE (ROXICODONE) 5 MG tablet Take 1-2 tablets (5-10 mg) by mouth every 6 hours as needed for moderate pain  Patient not taking: Reported on 10/31/2023 Not Taking  Juan Nelson MD     sucralfate (CARAFATE) 1 GM/10ML suspension Take 10 mLs (1 g) by mouth 4 times daily as needed (epigastric pain, regurgitation)  Patient not taking: Reported on 10/24/2023   Britta Mcgee NP     Vitamin D 12.5 MCG/0.25ML LIQD Take 2.5 mLs by mouth daily Goal is 5,000 international unit(s) of water miscible vitamin D 3 daily (125mcg)  Patient not taking: Reported on 10/24/2023   Britta Mcgee NP

## 2023-11-02 ENCOUNTER — CARE COORDINATION (OUTPATIENT)
Dept: ENDOCRINOLOGY | Facility: CLINIC | Age: 43
End: 2023-11-02

## 2023-11-02 VITALS
OXYGEN SATURATION: 100 % | DIASTOLIC BLOOD PRESSURE: 61 MMHG | BODY MASS INDEX: 20.29 KG/M2 | SYSTOLIC BLOOD PRESSURE: 91 MMHG | HEART RATE: 59 BPM | WEIGHT: 137 LBS | HEIGHT: 69 IN | RESPIRATION RATE: 16 BRPM | TEMPERATURE: 98.4 F

## 2023-11-02 PROCEDURE — 250N000013 HC RX MED GY IP 250 OP 250 PS 637

## 2023-11-02 PROCEDURE — G0378 HOSPITAL OBSERVATION PER HR: HCPCS

## 2023-11-02 RX ORDER — POLYETHYLENE GLYCOL 3350 17 G/17G
17 POWDER, FOR SOLUTION ORAL DAILY PRN
Qty: 510 G | Refills: 0 | Status: SHIPPED | OUTPATIENT
Start: 2023-11-02 | End: 2023-11-21

## 2023-11-02 RX ORDER — AMOXICILLIN 250 MG
1 CAPSULE ORAL
Qty: 30 TABLET | Refills: 0 | Status: SHIPPED | OUTPATIENT
Start: 2023-11-02 | End: 2023-11-21

## 2023-11-02 RX ADMIN — POLYETHYLENE GLYCOL 3350 17 G: 17 POWDER, FOR SOLUTION ORAL at 09:17

## 2023-11-02 ASSESSMENT — ACTIVITIES OF DAILY LIVING (ADL)
ADLS_ACUITY_SCORE: 18

## 2023-11-02 NOTE — PROGRESS NOTES
Resident/Fellow Attestation   I, Shari Malave MD, was present with the medical/SHALONDA student who participated in the service and in the documentation of the note.  I have verified the history and personally performed the physical exam and medical decision making.  I agree with the assessment and plan of care as documented in the note.      Patient passing gas and had liquid bowel movement today. Denies nausea/vomiting or abdominal distension. Will advance diet to FLD today. Anticipate likely discharge home if tolerating PO.    Shari Malave MD  PGY1  Date of Service (when I saw the patient): 11/02/23    [facility]    Progress Note - General Surgery Service       Date of Admission:  10/30/2023    Assessment & Plan: Surgery   Juanis Sparks is a 43 year old female admitted on 10/30/2023. She has a history of laparoscopic gastric bypass with duodenal switch in 2016 with recent revision on 10/23/23. Patient presented to the emergency department 10/30 for continued abdominal pain, constipation, and rash to her incision site. Found to have a 2.3 cm fluid collection under incision most likely hematoma. GGC 10/31 with contrast through colon.    Her pain is well controlled and she has had a bowel movement with Miralax and Senna with passage of flatus. Abdominal erythema improving and not concerning for cellulitis.     Plan:  - Advance to full liquid diet  - Multimodal pain control PRN  - Antiemetics PRN  - Continue bowel regimen  - DC IVF  - Encourage frequent ambulation   - Anticipate discharge home today       Diet: Full Liquid Diet    DVT Prophylaxis: Low Risk/Ambulatory with no VTE prophylaxis indicated  Anthony Catheter: Not present  Lines: None     Drains: None     Cardiac Monitoring: None  Code Status: Full Code      Clinically Significant Risk Factors Present on Admission                                  Disposition Plan     Expected Discharge Date: 11/02/2023                  The patient's care was discussed with the Chief Resident/Fellow.    Miko Shafer, MS4    [facility]  Non-urgent messages: Securely message with [employer] (more info)  Text page via [employer] Paging/Directory     ______________________________________________________________________    Interval History   Patient is doing well. Passing gas, had a liquid bowel movement this am. Denies nausea, vomiting, bloating.    Physical Exam   Vital Signs: Temp: 98.4  F (36.9  C) Temp src: Oral BP: 91/61 Pulse: 59   Resp: 16 SpO2: 100 % O2 Device: None (Room air)    Weight: 137 lbs 0 oz  Intake/Output Summary (Last 24 hours) at 11/2/2023 0727  Last data filed at 11/1/2023 1700  Gross per 24 hour   Intake 900 ml   Output --   Net 900 ml     General Appearance: A&O x4, NAD  Respiratory: non-labored respirations  Cardiovascular: regular rate and rhythm  GI: abdomen soft, non-distended, non-tender to palpation  Skin: Warm and well perfused          Data   Recent Labs   Lab 11/01/23  0640 10/30/23  2032   WBC 5.9 7.2   HGB 10.7* 12.4   MCV 99 96    346    140   POTASSIUM 4.3 3.8   CHLORIDE 106 101   CO2 26 26   BUN 6.3 13.8   CR 0.55 0.56   ANIONGAP 9 13   SHIRLEY 8.6 9.1   GLC 92 94   ALBUMIN  --  4.1   PROTTOTAL  --  7.1   BILITOTAL  --  0.5   ALKPHOS  --  102   ALT  --  24   AST  --  22   LIPASE  --  119*

## 2023-11-02 NOTE — PROGRESS NOTES
"Neuro: A/Ox4  Cardiac: denies chest pain  Respiratory: denies SOB, stable on room air  GI/: hypoactive bowel sounds, not passing gas, denies nausea  Diet/appetite: tolerating clear liquid diet  Activity: up independently in room  Pain: denies pain  Skin: midline abdominal incision with rash  LDA's: PIV in L arm, CDI, saline locked     ..BP 91/64 (BP Location: Right arm)   Pulse 73   Temp 98.6  F (37  C) (Oral)   Resp 16   Ht 1.753 m (5' 9\")   Wt 62.1 kg (137 lb)   SpO2 100%   BMI 20.23 kg/m     "

## 2023-11-03 ENCOUNTER — PATIENT OUTREACH (OUTPATIENT)
Dept: CARE COORDINATION | Facility: CLINIC | Age: 43
End: 2023-11-03
Payer: COMMERCIAL

## 2023-11-03 DIAGNOSIS — E21.3 HYPERPARATHYROIDISM, UNSPECIFIED (H): ICD-10-CM

## 2023-11-03 DIAGNOSIS — Z98.84 S/P BARIATRIC SURGERY: ICD-10-CM

## 2023-11-03 DIAGNOSIS — E56.9 VITAMIN DEFICIENCY: ICD-10-CM

## 2023-11-03 DIAGNOSIS — K90.9 INTESTINAL MALABSORPTION, UNSPECIFIED TYPE: ICD-10-CM

## 2023-11-03 NOTE — PROGRESS NOTES
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 She presented to the ED on 10/30/2023 with several days of abdominal pain, not passing gas, and new rash around her wound after surgery. Her last bowel movement at the time was on 10/28/23 despite Dulcolax and an enema. She had minimal passage of flatus. Her rash began 10/26/2023 around her incision with serous drainage without purulence.      ASSESSMENT           Discharge Assessment  How are you doing now that you are home?: Patient shares that she is doing well, just sore. No questions/concnerns or needs at this time.  How are your symptoms? (Red Flag symptoms escalate to triage hotline per guidelines): Improved  Do you feel your condition is stable enough to be safe at home until your provider visit?: Yes  Does the patient have their discharge instructions? : Yes  Does the patient have questions regarding their discharge instructions? : No  Were you started on any new medications or were there changes to any of your previous medications? : Yes  Does the patient have all of their medications?: Yes  Do you have questions regarding any of your medications? : No  Do you have all of your needed medical supplies or equipment (DME)?  (i.e. oxygen tank, CPAP, cane, etc.): Yes  Discharge follow-up appointment scheduled within 14 calendar days? : Yes  Discharge Follow Up Appointment Date:  11/30/23  Discharge Follow Up Appointment Scheduled with?: Specialty Care Provider (post surgery f/u)    Post-op ([facility] Only)  If the patient had a surgery or procedure, do they have any questions for a nurse?: No    Post-op (Clinicians Only)  Did the patient have surgery or a procedure: Yes  Incision: closed  Bleeding: light  Fever: No  Chills: No  Redness: No  Warmth: No  Swelling: No  Incision site pain: No      PLAN                                                      Outpatient Plan:  Follow up with Dr. Roth in clinic in 1 week after discharge. You should be called to make an appointment within 3 business days. If you are not contacted, call [phone] to make an appointment     Future Appointments   Date Time Provider Department Center   11/30/2023 10:00 AM Doyle Roth MD [facility]         For any urgent concerns, please contact our 24 hour nurse triage line: [phone] ([phone])         DIPIKA York

## 2023-11-06 RX ORDER — MULTIVIT WITH MINERALS/LUTEIN
250 TABLET ORAL 3 TIMES DAILY
Qty: 90 TABLET | Refills: 4 | Status: SHIPPED | OUTPATIENT
Start: 2023-11-06

## 2023-11-06 NOTE — TELEPHONE ENCOUNTER
vitamin C (ASCORBIC ACID) 250 MG tablet 90 tablet 3 6/12/2023       Last Office Visit: 8/10/23  Future Office visit:   11/30/23      Routing refill request to provider for review/approval because:  Not on protocol for [facility] Bessie Nicole RN

## 2023-11-09 ENCOUNTER — HOSPITAL ENCOUNTER (OUTPATIENT)
Facility: CLINIC | Age: 43
Setting detail: OBSERVATION
Discharge: HOME | End: 2023-11-10
Attending: EMERGENCY MEDICINE | Admitting: SOCIAL WORKER
Payer: COMMERCIAL

## 2023-11-09 ENCOUNTER — APPOINTMENT (OUTPATIENT)
Dept: CT IMAGING | Facility: CLINIC | Age: 43
End: 2023-11-09
Attending: EMERGENCY MEDICINE
Payer: COMMERCIAL

## 2023-11-09 DIAGNOSIS — K56.2 VOLVULUS (H): ICD-10-CM

## 2023-11-09 DIAGNOSIS — Z98.84 STATUS POST BARIATRIC SURGERY: Primary | ICD-10-CM

## 2023-11-09 LAB
ALBUMIN SERPL BCG-MCNC: 4 G/DL (ref 3.5–5.2)
ALBUMIN UR-MCNC: NEGATIVE MG/DL
ALP SERPL-CCNC: 87 U/L (ref 35–104)
ALT SERPL W P-5'-P-CCNC: 17 U/L (ref 0–50)
ANION GAP SERPL CALCULATED.3IONS-SCNC: 10 MMOL/L (ref 7–15)
APPEARANCE UR: CLEAR
AST SERPL W P-5'-P-CCNC: 19 U/L (ref 0–45)
BASOPHILS # BLD AUTO: 0.1 10E3/UL (ref 0–0.2)
BASOPHILS NFR BLD AUTO: 1 %
BILIRUB SERPL-MCNC: 0.3 MG/DL
BILIRUB UR QL STRIP: NEGATIVE
BUN SERPL-MCNC: 18.6 MG/DL (ref 6–20)
CALCIUM SERPL-MCNC: 9 MG/DL (ref 8.6–10)
CHLORIDE SERPL-SCNC: 105 MMOL/L (ref 98–107)
COLOR UR AUTO: ABNORMAL
CREAT SERPL-MCNC: 0.51 MG/DL (ref 0.51–0.95)
DEPRECATED HCO3 PLAS-SCNC: 25 MMOL/L (ref 22–29)
EGFRCR SERPLBLD CKD-EPI 2021: >90 ML/MIN/1.73M2
EOSINOPHIL # BLD AUTO: 0.2 10E3/UL (ref 0–0.7)
EOSINOPHIL NFR BLD AUTO: 4 %
ERYTHROCYTE [DISTWIDTH] IN BLOOD BY AUTOMATED COUNT: 11.5 % (ref 10–15)
GLUCOSE SERPL-MCNC: 109 MG/DL (ref 70–99)
GLUCOSE UR STRIP-MCNC: NEGATIVE MG/DL
HCG UR QL: NEGATIVE
HCT VFR BLD AUTO: 35.6 % (ref 35–47)
HGB BLD-MCNC: 11.6 G/DL (ref 11.7–15.7)
HGB UR QL STRIP: NEGATIVE
HOLD SPECIMEN: NORMAL
IMM GRANULOCYTES # BLD: 0 10E3/UL
IMM GRANULOCYTES NFR BLD: 1 %
KETONES UR STRIP-MCNC: NEGATIVE MG/DL
LEUKOCYTE ESTERASE UR QL STRIP: NEGATIVE
LIPASE SERPL-CCNC: 145 U/L (ref 13–60)
LYMPHOCYTES # BLD AUTO: 1.7 10E3/UL (ref 0.8–5.3)
LYMPHOCYTES NFR BLD AUTO: 30 %
MCH RBC QN AUTO: 31.5 PG (ref 26.5–33)
MCHC RBC AUTO-ENTMCNC: 32.6 G/DL (ref 31.5–36.5)
MCV RBC AUTO: 97 FL (ref 78–100)
MONOCYTES # BLD AUTO: 0.6 10E3/UL (ref 0–1.3)
MONOCYTES NFR BLD AUTO: 10 %
MUCOUS THREADS #/AREA URNS LPF: PRESENT /LPF
NEUTROPHILS # BLD AUTO: 3 10E3/UL (ref 1.6–8.3)
NEUTROPHILS NFR BLD AUTO: 54 %
NITRATE UR QL: NEGATIVE
NRBC # BLD AUTO: 0 10E3/UL
NRBC BLD AUTO-RTO: 0 /100
PH UR STRIP: 6.5 [PH] (ref 5–7)
PLATELET # BLD AUTO: 384 10E3/UL (ref 150–450)
POTASSIUM SERPL-SCNC: 4 MMOL/L (ref 3.4–5.3)
PROT SERPL-MCNC: 6.8 G/DL (ref 6.4–8.3)
RBC # BLD AUTO: 3.68 10E6/UL (ref 3.8–5.2)
RBC URINE: 0 /HPF
SODIUM SERPL-SCNC: 140 MMOL/L (ref 135–145)
SP GR UR STRIP: 1.01 (ref 1–1.03)
SQUAMOUS EPITHELIAL: 1 /HPF
UROBILINOGEN UR STRIP-MCNC: NORMAL MG/DL
WBC # BLD AUTO: 5.5 10E3/UL (ref 4–11)
WBC URINE: 0 /HPF

## 2023-11-09 PROCEDURE — 250N000011 HC RX IP 250 OP 636: Performed by: EMERGENCY MEDICINE

## 2023-11-09 PROCEDURE — G0378 HOSPITAL OBSERVATION PER HR: HCPCS

## 2023-11-09 PROCEDURE — 96361 HYDRATE IV INFUSION ADD-ON: CPT | Performed by: EMERGENCY MEDICINE

## 2023-11-09 PROCEDURE — 250N000013 HC RX MED GY IP 250 OP 250 PS 637

## 2023-11-09 PROCEDURE — 96361 HYDRATE IV INFUSION ADD-ON: CPT

## 2023-11-09 PROCEDURE — 96374 THER/PROPH/DIAG INJ IV PUSH: CPT

## 2023-11-09 PROCEDURE — 250N000013 HC RX MED GY IP 250 OP 250 PS 637: Performed by: STUDENT IN AN ORGANIZED HEALTH CARE EDUCATION/TRAINING PROGRAM

## 2023-11-09 PROCEDURE — 250N000011 HC RX IP 250 OP 636: Mod: JZ | Performed by: STUDENT IN AN ORGANIZED HEALTH CARE EDUCATION/TRAINING PROGRAM

## 2023-11-09 PROCEDURE — 99285 EMERGENCY DEPT VISIT HI MDM: CPT | Mod: 25 | Performed by: EMERGENCY MEDICINE

## 2023-11-09 PROCEDURE — 36415 COLL VENOUS BLD VENIPUNCTURE: CPT | Performed by: EMERGENCY MEDICINE

## 2023-11-09 PROCEDURE — 81001 URINALYSIS AUTO W/SCOPE: CPT | Performed by: EMERGENCY MEDICINE

## 2023-11-09 PROCEDURE — 82310 ASSAY OF CALCIUM: CPT | Performed by: EMERGENCY MEDICINE

## 2023-11-09 PROCEDURE — 85025 COMPLETE CBC W/AUTO DIFF WBC: CPT | Performed by: EMERGENCY MEDICINE

## 2023-11-09 PROCEDURE — 96360 HYDRATION IV INFUSION INIT: CPT | Performed by: EMERGENCY MEDICINE

## 2023-11-09 PROCEDURE — 74177 CT ABD & PELVIS W/CONTRAST: CPT | Mod: 26 | Performed by: RADIOLOGY

## 2023-11-09 PROCEDURE — 74177 CT ABD & PELVIS W/CONTRAST: CPT

## 2023-11-09 PROCEDURE — 81025 URINE PREGNANCY TEST: CPT | Performed by: EMERGENCY MEDICINE

## 2023-11-09 PROCEDURE — 258N000001 HC RX 258: Performed by: STUDENT IN AN ORGANIZED HEALTH CARE EDUCATION/TRAINING PROGRAM

## 2023-11-09 PROCEDURE — 96375 TX/PRO/DX INJ NEW DRUG ADDON: CPT

## 2023-11-09 PROCEDURE — 83690 ASSAY OF LIPASE: CPT | Performed by: EMERGENCY MEDICINE

## 2023-11-09 PROCEDURE — 258N000003 HC RX IP 258 OP 636: Performed by: EMERGENCY MEDICINE

## 2023-11-09 PROCEDURE — 99285 EMERGENCY DEPT VISIT HI MDM: CPT | Performed by: EMERGENCY MEDICINE

## 2023-11-09 RX ORDER — PROCHLORPERAZINE MALEATE 10 MG
10 TABLET ORAL EVERY 6 HOURS PRN
Status: DISCONTINUED | OUTPATIENT
Start: 2023-11-09 | End: 2023-11-10 | Stop reason: HOSPADM

## 2023-11-09 RX ORDER — IOPAMIDOL 755 MG/ML
84 INJECTION, SOLUTION INTRAVASCULAR ONCE
Status: COMPLETED | OUTPATIENT
Start: 2023-11-09 | End: 2023-11-09

## 2023-11-09 RX ORDER — SIMETHICONE 80 MG
80 TABLET,CHEWABLE ORAL ONCE
Status: COMPLETED | OUTPATIENT
Start: 2023-11-09 | End: 2023-11-09

## 2023-11-09 RX ORDER — ONDANSETRON 2 MG/ML
4 INJECTION INTRAMUSCULAR; INTRAVENOUS EVERY 6 HOURS PRN
Status: DISCONTINUED | OUTPATIENT
Start: 2023-11-09 | End: 2023-11-10 | Stop reason: HOSPADM

## 2023-11-09 RX ORDER — POLYETHYLENE GLYCOL 3350 17 G/17G
17 POWDER, FOR SOLUTION ORAL DAILY
Status: DISCONTINUED | OUTPATIENT
Start: 2023-11-09 | End: 2023-11-10 | Stop reason: HOSPADM

## 2023-11-09 RX ORDER — DEXTROSE MONOHYDRATE, SODIUM CHLORIDE, AND POTASSIUM CHLORIDE 50; 1.49; 9 G/1000ML; G/1000ML; G/1000ML
100 INJECTION, SOLUTION INTRAVENOUS CONTINUOUS
Status: DISCONTINUED | OUTPATIENT
Start: 2023-11-09 | End: 2023-11-10

## 2023-11-09 RX ORDER — ONDANSETRON 4 MG/1
4 TABLET, ORALLY DISINTEGRATING ORAL EVERY 6 HOURS PRN
Status: DISCONTINUED | OUTPATIENT
Start: 2023-11-09 | End: 2023-11-10 | Stop reason: HOSPADM

## 2023-11-09 RX ORDER — BISACODYL 10 MG
10 SUPPOSITORY, RECTAL RECTAL DAILY PRN
Status: DISCONTINUED | OUTPATIENT
Start: 2023-11-09 | End: 2023-11-10 | Stop reason: HOSPADM

## 2023-11-09 RX ORDER — PROCHLORPERAZINE 25 MG
25 SUPPOSITORY, RECTAL RECTAL EVERY 12 HOURS PRN
Status: DISCONTINUED | OUTPATIENT
Start: 2023-11-09 | End: 2023-11-10 | Stop reason: HOSPADM

## 2023-11-09 RX ORDER — ACETAMINOPHEN 325 MG/1
650 TABLET ORAL EVERY 6 HOURS PRN
Status: DISCONTINUED | OUTPATIENT
Start: 2023-11-09 | End: 2023-11-10 | Stop reason: HOSPADM

## 2023-11-09 RX ADMIN — IOPAMIDOL 84 ML: 755 INJECTION, SOLUTION INTRAVENOUS at 06:32

## 2023-11-09 RX ADMIN — POTASSIUM CHLORIDE, DEXTROSE MONOHYDRATE AND SODIUM CHLORIDE 100 ML/HR: 150; 5; 900 INJECTION, SOLUTION INTRAVENOUS at 15:48

## 2023-11-09 RX ADMIN — ONDANSETRON 4 MG: 2 INJECTION INTRAMUSCULAR; INTRAVENOUS at 23:24

## 2023-11-09 RX ADMIN — SIMETHICONE 80 MG: 80 TABLET, CHEWABLE ORAL at 17:08

## 2023-11-09 RX ADMIN — POLYETHYLENE GLYCOL 3350 17 G: 17 POWDER, FOR SOLUTION ORAL at 15:47

## 2023-11-09 RX ADMIN — SODIUM CHLORIDE 1000 ML: 9 INJECTION, SOLUTION INTRAVENOUS at 11:01

## 2023-11-09 ASSESSMENT — ACTIVITIES OF DAILY LIVING (ADL)
ADLS_ACUITY_SCORE: 35
ADLS_ACUITY_SCORE: 33
ADLS_ACUITY_SCORE: 35
ADLS_ACUITY_SCORE: 33
ADLS_ACUITY_SCORE: 35

## 2023-11-09 ASSESSMENT — COLUMBIA-SUICIDE SEVERITY RATING SCALE - C-SSRS
6. HAVE YOU EVER DONE ANYTHING, STARTED TO DO ANYTHING, OR PREPARED TO DO ANYTHING TO END YOUR LIFE?: NO
2. HAVE YOU ACTUALLY HAD ANY THOUGHTS OF KILLING YOURSELF IN THE PAST MONTH?: NO
1. IN THE PAST MONTH, HAVE YOU WISHED YOU WERE DEAD OR WISHED YOU COULD GO TO SLEEP AND NOT WAKE UP?: NO

## 2023-11-09 NOTE — LETTER
[facility]  [address] [address]    November 10, 2023        Juanis Sparks  [address] [place] [facility] [address]          To whom it may concern:    Juanis Sparks was hospitalized from 11/9/2023 - 11/10/2023 for acute illness. She may return to work on Monday 11/13/2023. Activity as tolerated.      Sincerely,        Shari Malave MD

## 2023-11-09 NOTE — ED PROVIDER NOTES
ED Provider Note  [facility]      History     Chief Complaint   Patient presents with    Abdominal Pain     Abd pain hx of this. NV.     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  Patient complains of abdominal pain which she describes as severe pain in her epigastric region and left upper quadrant that has been ongoing for the past 2 days.  Patient reports severe worsening of pain right around 12 midnight along with nausea, vomiting, so came in for further evaluation.  Patient states that in general she has been feeling unwell since she left the hospital with some abdominal pain, and reports no to minimal bowel movements or passing gas.  Patient states that if she tries hard to strain she may have passed some gas or have a small bowel movement.  Patient denies any fever, chills, chest pain, shortness of breath, dysuria.  No other complaints.  Patient has been taking Tylenol for pain.        Past Medical History  Past Medical History:   Diagnosis Date    Anemia     Gastroesophageal reflux disease with esophagitis     Hyperparathyroidism (H24)     Mild protein malnutrition (H24)     Osteoporosis     Postsurgical malabsorption      Past Surgical History:   Procedure Laterality Date    BYPASS GASTRIC DUODENAL SWITCH N/A 10/23/2023    Procedure: small bowel resection; redo enteroenterostomy  and closure of mesenteric defect;  Surgeon: Doyle Roth MD;  Location:  OR    ESOPHAGOSCOPY, GASTROSCOPY, DUODENOSCOPY (EGD), COMBINED N/A 06/14/2023    Procedure: ESOPHAGOGASTRODUODENOSCOPY, WITH BIOPSY;  Surgeon: Doyle Roth MD;  Location: [facility]    GASTRECTOMY,  SLEEVE, LAPAROSCOPIC, WITH DUODENAL SWITCH N/A 03/29/2016    Dr. Robin Ramos, NARGIS, Ethel Miranda; loop DS with 300cm efferent; 40Fr sleeve.    LAPAROTOMY, LYSIS ADHESIONS, COMBINED N/A 10/23/2023    Procedure: LAPAROTOMY, EXPLORATORY, WITH LYSIS OF ADHESIONS;  Surgeon: Doyle Roth MD;  Location: [facility] OR    IL LAPAROSCOPIC ENTEROENTEROSTOMY, ANASTOMOSIS OF INTESTINE N/A 06/21/2016    bile reflux and stricture of DI; converted to 50cm alimentary (Mary Ann) and 250 common channel     acetaminophen (TYLENOL) 325 MG tablet  calcium carbonate (TUMS) 500 MG chewable tablet  Calcium Carbonate-Vitamin D 600-5 MG-MCG TABS  copper gluconate 2 MG tablet  cyanocobalamin (CYANOCOBALAMIN) 1000 MCG/ML injection  Ferrous Sulfate 324 MG TBEC  lipase-protease-amylase (CREON) 05281-00[address]-830019 units CPEP per EC capsule  multivitamin w/minerals (MULTI-VITAMIN) tablet  omeprazole (PRILOSEC) 40 MG DR capsule  oxyCODONE (ROXICODONE) 5 MG tablet  polyethylene glycol (MIRALAX) 17 GM/Dose powder  senna-docusate (SENOKOT-S/PERICOLACE) 8.6-50 MG tablet  sucralfate (CARAFATE) 1 GM/10ML suspension  VITAMIN A PO  vitamin C (ASCORBIC ACID) 250 MG tablet  Vitamin D 12.5 MCG/0.25ML LIQD  vitamin D2 (ERGOCALCIFEROL) 11310 units (1250 mcg) capsule  vitamin D3 (CHOLECALCIFEROL) 125 MCG (5000 UT) tablet  vitamin E (TOCOPHEROL) 400 units (180 mg) capsule  Zinc 100 MG TABS      Allergies   Allergen Reactions    Amoxicillin Hives and Itching    Tetracycline Hives    Cyanoacrylate Rash     Family History  No family history on file.  Social History   Social History     Tobacco Use    Smoking status: Never     Passive exposure: Never    Smokeless tobacco: Never   Substance Use Topics    Alcohol use: Not Currently    Drug use: Not Currently      Past medical history, past surgical history, medications, allergies, family history, and social history were reviewed with the patient. No additional pertinent items.      A medically appropriate review of systems was  performed with pertinent positives and negatives noted in the HPI, and all other systems negative.    Physical Exam   BP: 100/68  Pulse: 82  Temp: 98.1  F (36.7  C)  Resp: 16  SpO2: 98 %  Physical Exam  General: Afebrile, no distress secondary to pain  HEENT: Normocephalic, atraumatic, conjunctivae normal. MMM  Neck: non-tender, supple  Cardio: regular rate. regular rhythm   Resp: Normal work of breathing, no respiratory distress, lungs clear bilaterally, no wheezing, rhonchi, rales  Chest/Back: no visual signs of trauma, no CVA tenderness   Abdomen: soft, moderate diffuse tenderness to herniation throughout entire abdomen.  Midline incisional scar with no erythema.  No rebound, no guarding, no peritoneal signs.    Neuro: alert and fully oriented. CN II-XII grossly intact. Grossly normal strength and sensation in all extremities.   MSK: no deformities. Normal range of motion  Integumentary/Skin: no rash visualized, normal color  Psych: normal affect, normal behavior        ED Course, Procedures, & Data      Procedures    Results for orders placed or performed during the hospital encounter of 11/09/23   CT Abdomen Pelvis w Contrast     Status: None    Narrative    EXAM: CT ABDOMEN PELVIS W CONTRAST  LOCATION: [facility]  DATE: 11/9/2023    INDICATION: Abdominal pain. History of duodenal switch status post recent laparotomy and lysis of adhesion.  COMPARISON: 10/30/2023  TECHNIQUE: CT scan of the abdomen and pelvis was performed following injection of IV contrast. Multiplanar reformats were obtained. Dose reduction techniques were used.  CONTRAST: iopamidol (ISOVUE 370) solution 84 mL    FINDINGS:   LOWER CHEST: Normal.    HEPATOBILIARY: Normal liver with unchanged 1.6 cm cyst in the hepatic dome. Gallbladder is surgically absent.    PANCREAS: Normal.    SPLEEN: Normal.    ADRENAL GLANDS: Normal.    KIDNEYS/BLADDER: Normal. A 1.8 cm simple right renal cyst does not require  follow-up.    BOWEL: Multiple surgical clips in bilateral abdominal small bowel including the right upper quadrant near the gastric outlet in keeping with history of duodenal switch. There is generalized mesenteric swirling in the left midabdomen near a small bowel   anastomosis with multiple moderately distended small bowel loops in the left upper quadrant, medially displacing the stomach. Multiple draining veins to these distended small bowel loops are severely narrowed. Moderate amount of stool seen in the colon   to the level of the rectum. Previous post surgical pneumoperitoneum has resolved. No free fluid.    LYMPH NODES: Multiple mildly enlarged mesenteric lymph nodes are present with mild mesenteric fat stranding, mildly more pronounced compared to prior study.    VASCULATURE: Unremarkable.    PELVIC ORGANS: Status post hysterectomy. A previously enlarged left ovarian cyst has decreased in size now measuring 2.8 cm, previously 4.5 cm, compatible with a regressing cyst or follicle. No abnormal right adnexal masses.    MUSCULOSKELETAL: Normal.        Impression    IMPRESSION:     1.  Generalized mesenteric/bowel swelling in the left mid abdomen near a small bowel anastomosis with persistent mild to moderate distention of multiple small bowel loops in the left upper quadrant that medially displaces the stomach. The draining veins   to these bowel loops are narrowed. Regional mesenteric lymph nodes and background fat stranding have increased, indicating fluid engorgement. Findings are concerning for small bowel volvulus due to internal hernia or adhesion. Surgical consultation is   recommended.   [place] Draw     Status: None    Narrative    The following orders were created for panel order [place] Draw.  Procedure                               Abnormality         Status                     ---------                               -----------         ------                     Extra Blue Top Tube[[id number]]                               Final result               Extra Red Top Tube[[id number]]                               Final result               Extra Green Top (Lithium...[[id number]]                      Final result               Extra Purple Top Tube[[id number]]                            Final result                 Please view results for these tests on the individual orders.   Extra Blue Top Tube     Status: None   Result Value Ref Range    Hold Specimen JIC    Extra Red Top Tube     Status: None   Result Value Ref Range    Hold Specimen JIC    Extra Green Top (Lithium Heparin) Tube     Status: None   Result Value Ref Range    Hold Specimen JIC    Extra Purple Top Tube     Status: None   Result Value Ref Range    Hold Specimen JIC    Comprehensive metabolic panel     Status: Abnormal   Result Value Ref Range    Sodium 140 135 - 145 mmol/L    Potassium 4.0 3.4 - 5.3 mmol/L    Carbon Dioxide (CO2) 25 22 - 29 mmol/L    Anion Gap 10 7 - 15 mmol/L    Urea Nitrogen 18.6 6.0 - 20.0 mg/dL    Creatinine 0.51 0.51 - 0.95 mg/dL    GFR Estimate >90 >60 mL/min/1.73m2    Calcium 9.0 8.6 - 10.0 mg/dL    Chloride 105 98 - 107 mmol/L    Glucose 109 (H) 70 - 99 mg/dL    Alkaline Phosphatase 87 35 - 104 U/L    AST 19 0 - 45 U/L    ALT 17 0 - 50 U/L    Protein Total 6.8 6.4 - 8.3 g/dL    Albumin 4.0 3.5 - 5.2 g/dL    Bilirubin Total 0.3 <=1.2 mg/dL   Lipase     Status: Abnormal   Result Value Ref Range    Lipase 145 (H) 13 - 60 U/L   UA with Microscopic reflex to Culture     Status: Abnormal    Specimen: Urine, Midstream   Result Value Ref Range    Color Urine Light Yellow Colorless, Straw, Light Yellow, Yellow    Appearance Urine Clear Clear    Glucose Urine Negative Negative mg/dL    Bilirubin Urine Negative Negative    Ketones Urine Negative Negative mg/dL    Specific Gravity Urine 1.009 1.003 - 1.035    Blood Urine Negative Negative    pH Urine 6.5 5.0 - 7.0    Protein Albumin Urine Negative Negative mg/dL    Urobilinogen Urine Normal  Normal, 2.0 mg/dL    Nitrite Urine Negative Negative    Leukocyte Esterase Urine Negative Negative    Mucus Urine Present (A) None Seen /LPF    RBC Urine 0 <=2 /HPF    WBC Urine 0 <=5 /HPF    Squamous Epithelials Urine 1 <=1 /HPF    Narrative    Urine Culture not indicated   HCG qualitative urine     Status: Normal   Result Value Ref Range    hCG Urine Qualitative Negative Negative   CBC with platelets and differential     Status: Abnormal   Result Value Ref Range    WBC Count 5.5 4.0 - 11.0 10e3/uL    RBC Count 3.68 (L) 3.80 - 5.20 10e6/uL    Hemoglobin 11.6 (L) 11.7 - 15.7 g/dL    Hematocrit 35.6 35.0 - 47.0 %    MCV 97 78 - 100 fL    MCH 31.5 26.5 - 33.0 pg    MCHC 32.6 31.5 - 36.5 g/dL    RDW 11.5 10.0 - 15.0 %    Platelet Count 384 150 - 450 10e3/uL    % Neutrophils 54 %    % Lymphocytes 30 %    % Monocytes 10 %    % Eosinophils 4 %    % Basophils 1 %    % Immature Granulocytes 1 %    NRBCs per 100 WBC 0 <1 /100    Absolute Neutrophils 3.0 1.6 - 8.3 10e3/uL    Absolute Lymphocytes 1.7 0.8 - 5.3 10e3/uL    Absolute Monocytes 0.6 0.0 - 1.3 10e3/uL    Absolute Eosinophils 0.2 0.0 - 0.7 10e3/uL    Absolute Basophils 0.1 0.0 - 0.2 10e3/uL    Absolute Immature Granulocytes 0.0 <=0.4 10e3/uL    Absolute NRBCs 0.0 10e3/uL   CBC with platelets differential     Status: Abnormal    Narrative    The following orders were created for panel order CBC with platelets differential.  Procedure                               Abnormality         Status                     ---------                               -----------         ------                     CBC with platelets and d...[[id number]]  Abnormal            Final result                 Please view results for these tests on the individual orders.     Medications   iopamidol (ISOVUE-370) solution 84 mL (84 mLs Intravenous $Given 11/9/23 0632)   sodium chloride (PF) 0.9% PF flush 71 mL (71 mLs Intravenous $Given 11/9/23 0632)     Labs Ordered and Resulted from Time of ED  Arrival to Time of ED Departure   COMPREHENSIVE METABOLIC PANEL - Abnormal       Result Value    Sodium 140      Potassium 4.0      Carbon Dioxide (CO2) 25      Anion Gap 10      Urea Nitrogen 18.6      Creatinine 0.51      GFR Estimate >90      Calcium 9.0      Chloride 105      Glucose 109 (*)     Alkaline Phosphatase 87      AST 19      ALT 17      Protein Total 6.8      Albumin 4.0      Bilirubin Total 0.3     LIPASE - Abnormal    Lipase 145 (*)    ROUTINE UA WITH MICROSCOPIC REFLEX TO CULTURE - Abnormal    Color Urine Light Yellow      Appearance Urine Clear      Glucose Urine Negative      Bilirubin Urine Negative      Ketones Urine Negative      Specific Gravity Urine 1.009      Blood Urine Negative      pH Urine 6.5      Protein Albumin Urine Negative      Urobilinogen Urine Normal      Nitrite Urine Negative      Leukocyte Esterase Urine Negative      Mucus Urine Present (*)     RBC Urine 0      WBC Urine 0      Squamous Epithelials Urine 1     CBC WITH PLATELETS AND DIFFERENTIAL - Abnormal    WBC Count 5.5      RBC Count 3.68 (*)     Hemoglobin 11.6 (*)     Hematocrit 35.6      MCV 97      MCH 31.5      MCHC 32.6      RDW 11.5      Platelet Count 384      % Neutrophils 54      % Lymphocytes 30      % Monocytes 10      % Eosinophils 4      % Basophils 1      % Immature Granulocytes 1      NRBCs per 100 WBC 0      Absolute Neutrophils 3.0      Absolute Lymphocytes 1.7      Absolute Monocytes 0.6      Absolute Eosinophils 0.2      Absolute Basophils 0.1      Absolute Immature Granulocytes 0.0      Absolute NRBCs 0.0     HCG QUALITATIVE URINE - Normal    hCG Urine Qualitative Negative       CT Abdomen Pelvis w Contrast   Final Result   IMPRESSION:       1.  Generalized mesenteric/bowel swelling in the left mid abdomen near a small bowel anastomosis with persistent mild to moderate distention of multiple small bowel loops in the left upper quadrant that medially displaces the stomach. The draining veins    to  these bowel loops are narrowed. Regional mesenteric lymph nodes and background fat stranding have increased, indicating fluid engorgement. Findings are concerning for small bowel volvulus due to internal hernia or adhesion. Surgical consultation is    recommended.             Critical care was not performed.     Medical Decision Making  The patient's presentation was of high complexity (a chronic illness severe exacerbation, progression, or side effect of treatment).    The patient's evaluation involved:  review of external note(s) from 3+ sources (prior ED visits, hospitalization admission and discharge summaries)  review of 3+ test result(s) ordered prior to this encounter (recent labs, imaging)  ordering and/or review of 3+ test(s) in this encounter (see separate area of note for details)  discussion of management or test interpretation with another health professional (bariatric surgery team)    The patient's management necessitated further care after sign-out to morning provider (see their note for further management).    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  Upon arrival patient is nontoxic-appearing, afebrile, moderate distress secondary to pain.  Differential diagnosis includes but is not limited to postoperative pain versus ileus versus obstruction versus volvulus versus infection among others.  Patient declined anything for pain while in the emergency department.  I reviewed comprehensive labs which are unremarkable with low blood cell count 5.5, hemoglobin 11.6, no acute metabolic or electrolyte abnormality, urinalysis with no evidence of acute infection, negative pregnancy test. I discussed patient management with general  surgery resident/team covering for  bariatric surgery who will evaluate the patient emergency department.    Signed out to morning provider pending CT scan, bariatric surgery evaluation.  Patient understands and agrees with the plan.      I have reviewed the nursing notes. I have reviewed the findings, diagnosis, plan and need for follow up with the patient.    New Prescriptions    No medications on file       Final diagnoses:   None       Gypsy Barlow MD  [facility] EMERGENCY DEPARTMENT  11/9/2023     Gypsy Barlow MD  11/09/23 1527

## 2023-11-09 NOTE — H&P
[facility]    History and Physical - MIS/Bariatric Service       Date of Admission:  11/9/2023    Assessment & Plan: Surgery   Juanis Sparks is a 43 year old female admitted on 11/9/2023. 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 She presents today with 2 days of worsening abdominal pain and CT-scan that appears to be concerning for a possible internal hernia. However, patient appears to be improved on exam today.     - Admit to OBS under MIS  - CLD as tolerated  - Will continue monitoring exam, Possible discharge tomorrow        Diet:  CLD  DVT Prophylaxis: Low Risk/Ambulatory with no VTE prophylaxis indicated  Anthony Catheter: Not present  Lines: None     Drains: None     Cardiac Monitoring: None  Code Status:  Full    Clinically Significant Risk Factors Present on Admission                                  Disposition Plan          The patient's care was discussed with the Chief Resident/Fellow.    Kate Patiño MD  [facility]  Text page via [facility] Paging/Directory     ______________________________________________________________________    Chief Complaint   Abdominal pain    History is obtained from the patient    History of Present Illness   Juanis Sparks is a 43 year old female admitted on 11/9/2023. 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 She presents today with 2 days of worsening abdominal pain and CT-scan that appears to be concerning for a possible internal hernia. However, patient appears to be improved on exam today. States that she was having difficulty with nausea. Still passing gas.       Past Medical History    Past Medical History:   Diagnosis Date    Anemia      Gastroesophageal reflux disease with esophagitis     Hyperparathyroidism (H24)     Mild protein malnutrition (H24)     Osteoporosis     Postsurgical malabsorption        Past Surgical History   Past Surgical History:   Procedure Laterality Date    BYPASS GASTRIC DUODENAL SWITCH N/A 10/23/2023    Procedure: small bowel resection; redo enteroenterostomy  and closure of mesenteric defect;  Surgeon: Doyle Roth MD;  Location: [facility] OR    ESOPHAGOSCOPY, GASTROSCOPY, DUODENOSCOPY (EGD), COMBINED N/A 06/14/2023    Procedure: ESOPHAGOGASTRODUODENOSCOPY, WITH BIOPSY;  Surgeon: Doyle Roth MD;  Location: [facility] GI    GASTRECTOMY, SLEEVE, LAPAROSCOPIC, WITH DUODENAL SWITCH N/A 03/29/2016    Dr. Robin Ramos, Ethel BARILLAS; loop DS with 300cm efferent; 40Fr sleeve.    LAPAROTOMY, LYSIS ADHESIONS, COMBINED N/A 10/23/2023    Procedure: LAPAROTOMY, EXPLORATORY, WITH LYSIS OF ADHESIONS;  Surgeon: Doyle Roth MD;  Location: [facility] OR    [place] LAPAROSCOPIC ENTEROENTEROSTOMY, ANASTOMOSIS OF INTESTINE N/A 06/21/2016    bile reflux and stricture of DI; converted to 50cm alimentary (Mary Ann) and 250 common channel       Prior to Admission Medications   Prior to Admission Medications   Prescriptions Last Dose Informant Patient Reported? Taking?   Calcium Carbonate-Vitamin D 600-5 MG-MCG TABS   Yes No   Sig: Take 1 tablet by mouth 2 times daily   Ferrous Sulfate 324 MG TBEC   Yes No   Sig: Take 1 tablet by mouth in the AM and 2 tablets at bedtime.   VITAMIN A PO   Yes No   Sig: Take 1 tablet by mouth daily   Vitamin D 12.5 MCG/0.25ML LIQD   No No   Sig: Take 2.5 mLs by mouth daily Goal is 5,000 international unit(s) of water miscible vitamin D 3 daily (125mcg)   Patient not taking: Reported on 10/24/2023   Zinc 100 MG TABS   No No   Sig: Take 1 tablet (50 mg) by mouth daily   acetaminophen (TYLENOL) 325 MG tablet   No No   Sig: Take 2 tablets (650 mg) by mouth every 4 hours as needed for other (For optimal non-opioid  multimodal pain management to improve pain control and physical function.)   calcium carbonate (TUMS) 500 MG chewable tablet   Yes No   Sig: Take 1 chew tab by mouth 3 times daily as needed for heartburn   copper gluconate 2 MG tablet   No No   Sig: Take 1 tablet (2 mg) by mouth daily   Patient not taking: Reported on 10/9/2023   cyanocobalamin (CYANOCOBALAMIN) 1000 MCG/ML injection   Yes No   Sig: Inject 1,000 mcg into the muscle every 14 days Next dose 10/15/23   lipase-protease-amylase (CREON) 99711-88454-454399 units CPEP per EC capsule   No No   Sig: Take 3 capsules by mouth 3 times daily (with meals)   Patient not taking: Reported on 10/24/2023   multivitamin w/minerals (MULTI-VITAMIN) tablet   Yes No   Sig: Take 1 tablet by mouth daily   omeprazole (PRILOSEC) 40 MG DR capsule   No No   Sig: Take 1 capsule (40 mg) by mouth daily   Patient not taking: Reported on 10/24/2023   oxyCODONE (ROXICODONE) 5 MG tablet   No No   Sig: Take 1-2 tablets (5-10 mg) by mouth every 6 hours as needed for moderate pain   Patient not taking: Reported on 10/31/2023   polyethylene glycol (MIRALAX) 17 GM/Dose powder   No No   Sig: Take 17 g by mouth daily as needed   senna-docusate (SENOKOT-S/PERICOLACE) 8.6-50 MG tablet   No No   Sig: Take 1 tablet by mouth nightly as needed for constipation   sucralfate (CARAFATE) 1 GM/10ML suspension   No No   Sig: Take 10 mLs (1 g) by mouth 4 times daily as needed (epigastric pain, regurgitation)   Patient not taking: Reported on 10/24/2023   vitamin C (ASCORBIC ACID) 250 MG tablet   No No   Sig: Take 1 tablet (250 mg) by mouth 3 times daily   vitamin D2 (ERGOCALCIFEROL) 21625 units (1250 mcg) capsule   Yes No   Sig: Take 50,000 Units by mouth daily   vitamin D3 (CHOLECALCIFEROL) 125 MCG (5000 UT) tablet   Yes No   Sig: Take 5,000 Units by mouth daily   vitamin E (TOCOPHEROL) 400 units (180 mg) capsule   No No   Sig: Take 1 capsule (400 Units) by mouth daily      Facility-Administered  Medications: None          Physical Exam   Vital Signs: Temp: 98.1  F (36.7  C) Temp src: Oral BP: 95/68 Pulse: 84   Resp: 16 SpO2: 100 % O2 Device: None (Room air)    Weight: 0 lbs 0 ozNo intake or output data in the 24 hours ending 11/09/23 1117    AAOx3, NAD  NLB on RA  Abdomen soft, NT, ND, Prior incisions well-healed    Data     I have personally reviewed the following data over the past 24 hrs:    5.5  \   11.6 (L)   / 384     140 105 18.6 /  109 (H)   4.0 25 0.51 \     ALT: 17 AST: 19 AP: 87 TBILI: 0.3   ALB: 4.0 TOT PROTEIN: 6.8 LIPASE: 145 (H)       Imaging results reviewed over the past 24 hrs:   Recent Results (from the past 24 hour(s))   CT Abdomen Pelvis w Contrast    Narrative    EXAM: CT ABDOMEN PELVIS W CONTRAST  LOCATION: [facility]  DATE: 11/9/2023    INDICATION: Abdominal pain. History of duodenal switch status post recent laparotomy and lysis of adhesion.  COMPARISON: 10/30/2023  TECHNIQUE: CT scan of the abdomen and pelvis was performed following injection of IV contrast. Multiplanar reformats were obtained. Dose reduction techniques were used.  CONTRAST: iopamidol (ISOVUE 370) solution 84 mL    FINDINGS:   LOWER CHEST: Normal.    HEPATOBILIARY: Normal liver with unchanged 1.6 cm cyst in the hepatic dome. Gallbladder is surgically absent.    PANCREAS: Normal.    SPLEEN: Normal.    ADRENAL GLANDS: Normal.    KIDNEYS/BLADDER: Normal. A 1.8 cm simple right renal cyst does not require follow-up.    BOWEL: Multiple surgical clips in bilateral abdominal small bowel including the right upper quadrant near the gastric outlet in keeping with history of duodenal switch. There is generalized mesenteric swirling in the left midabdomen near a small bowel   anastomosis with multiple moderately distended small bowel loops in the left upper quadrant, medially displacing the stomach. Multiple draining veins to these distended small bowel loops are severely narrowed.  Moderate amount of stool seen in the colon   to the level of the rectum. Previous post surgical pneumoperitoneum has resolved. No free fluid.    LYMPH NODES: Multiple mildly enlarged mesenteric lymph nodes are present with mild mesenteric fat stranding, mildly more pronounced compared to prior study.    VASCULATURE: Unremarkable.    PELVIC ORGANS: Status post hysterectomy. A previously enlarged left ovarian cyst has decreased in size now measuring 2.8 cm, previously 4.5 cm, compatible with a regressing cyst or follicle. No abnormal right adnexal masses.    MUSCULOSKELETAL: Normal.        Impression    IMPRESSION:     1.  Generalized mesenteric/bowel swelling in the left mid abdomen near a small bowel anastomosis with persistent mild to moderate distention of multiple small bowel loops in the left upper quadrant that medially displaces the stomach. The draining veins   to these bowel loops are narrowed. Regional mesenteric lymph nodes and background fat stranding have increased, indicating fluid engorgement. Findings are concerning for small bowel volvulus due to internal hernia or adhesion. Surgical consultation is   recommended.

## 2023-11-10 VITALS
WEIGHT: 149.91 LBS | HEART RATE: 57 BPM | DIASTOLIC BLOOD PRESSURE: 88 MMHG | BODY MASS INDEX: 22.2 KG/M2 | OXYGEN SATURATION: 99 % | TEMPERATURE: 98.3 F | RESPIRATION RATE: 16 BRPM | HEIGHT: 69 IN | SYSTOLIC BLOOD PRESSURE: 94 MMHG

## 2023-11-10 PROCEDURE — 96376 TX/PRO/DX INJ SAME DRUG ADON: CPT

## 2023-11-10 PROCEDURE — 258N000001 HC RX 258: Performed by: STUDENT IN AN ORGANIZED HEALTH CARE EDUCATION/TRAINING PROGRAM

## 2023-11-10 PROCEDURE — G0378 HOSPITAL OBSERVATION PER HR: HCPCS

## 2023-11-10 PROCEDURE — 250N000013 HC RX MED GY IP 250 OP 250 PS 637: Performed by: STUDENT IN AN ORGANIZED HEALTH CARE EDUCATION/TRAINING PROGRAM

## 2023-11-10 RX ADMIN — POTASSIUM CHLORIDE, DEXTROSE MONOHYDRATE AND SODIUM CHLORIDE 100 ML/HR: 150; 5; 900 INJECTION, SOLUTION INTRAVENOUS at 02:00

## 2023-11-10 RX ADMIN — POLYETHYLENE GLYCOL 3350 17 G: 17 POWDER, FOR SOLUTION ORAL at 08:35

## 2023-11-10 ASSESSMENT — ACTIVITIES OF DAILY LIVING (ADL)
ADLS_ACUITY_SCORE: 18

## 2023-11-10 NOTE — DISCHARGE SUMMARY
[facility]  Discharge Summary  General Surgery     Juanis Sparks MRN# [id number]   YOB: 1980 Age: 43 year old     Date of Admission:  11/9/2023  Date of Discharge::  11/10/2023  Admitting Physician:  Doyle Roth MD  Discharge Physician:  Doyle Roth MD  Primary Care Physician:        Charito Rios          Admission Diagnoses:   Abdominal pain  S/p duodenal switch converted to RNY          Discharge Diagnosis:   Same as admission diagnoses         Procedures:   None          Consultations:   None          Brief History of Illness:   Per admission H&P 11/10/2023:  Juanis Sparks is a 43 year old female admitted on 11/9/2023. 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 She presents today with 2 days of worsening abdominal pain and CT-scan with findings concerning for a possible internal hernia. However, patient appears to be improved on exam today. States that she was having difficulty with nausea. Still passing gas.             Hospital Course:   The patient was admitted for observation. She was gently fluid resuscitated, provided with oral pain medications, anti-emetics, and a bowel regimen. On HD1, her abdominal pain was greatly improved, she was passing gas, and had a benign abdominal exam. She was afebrile and hemodynamically stable throughout her hospitalization. Abdominal pain is thought to be related to her recent surgery, and will likely wax and wane although overall steadily improve with time. On day of discharge, she was tolerating a bariatric full liquid diet, had adequate bowel and bladder function, and was voiding adequately and ambulating independently. Patient with follow up with Dr. Roth on 11/30/2023.           Imaging Studies:     Results for orders placed or performed during the hospital encounter of 11/09/23   CT Abdomen Pelvis w Contrast     Narrative    EXAM: CT ABDOMEN PELVIS W CONTRAST  LOCATION: [facility]  DATE: 11/9/2023    INDICATION: Abdominal pain. History of duodenal switch status post recent laparotomy and lysis of adhesion.  COMPARISON: 10/30/2023  TECHNIQUE: CT scan of the abdomen and pelvis was performed following injection of IV contrast. Multiplanar reformats were obtained. Dose reduction techniques were used.  CONTRAST: iopamidol (ISOVUE 370) solution 84 mL    FINDINGS:   LOWER CHEST: Normal.    HEPATOBILIARY: Normal liver with unchanged 1.6 cm cyst in the hepatic dome. Gallbladder is surgically absent.    PANCREAS: Normal.    SPLEEN: Normal.    ADRENAL GLANDS: Normal.    KIDNEYS/BLADDER: Normal. A 1.8 cm simple right renal cyst does not require follow-up.    BOWEL: Multiple surgical clips in bilateral abdominal small bowel including the right upper quadrant near the gastric outlet in keeping with history of duodenal switch. There is generalized mesenteric swirling in the left midabdomen near a small bowel   anastomosis with multiple moderately distended small bowel loops in the left upper quadrant, medially displacing the stomach. Multiple draining veins to these distended small bowel loops are severely narrowed. Moderate amount of stool seen in the colon   to the level of the rectum. Previous post surgical pneumoperitoneum has resolved. No free fluid.    LYMPH NODES: Multiple mildly enlarged mesenteric lymph nodes are present with mild mesenteric fat stranding, mildly more pronounced compared to prior study.    VASCULATURE: Unremarkable.    PELVIC ORGANS: Status post hysterectomy. A previously enlarged left ovarian cyst has decreased in size now measuring 2.8 cm, previously 4.5 cm, compatible with a regressing cyst or follicle. No abnormal right adnexal masses.    MUSCULOSKELETAL: Normal.        Impression    IMPRESSION:     1.  Generalized mesenteric/bowel swelling in the left mid  abdomen near a small bowel anastomosis with persistent mild to moderate distention of multiple small bowel loops in the left upper quadrant that medially displaces the stomach. The draining veins   to these bowel loops are narrowed. Regional mesenteric lymph nodes and background fat stranding have increased, indicating fluid engorgement. Findings are concerning for small bowel volvulus due to internal hernia or adhesion. Surgical consultation is   recommended.              Medications Prior to Admission:     Medications Prior to Admission   Medication Sig Dispense Refill Last Dose    acetaminophen (TYLENOL) 325 MG tablet Take 2 tablets (650 mg) by mouth every 4 hours as needed for other (For optimal non-opioid multimodal pain management to improve pain control and physical function.) 40 tablet 0     calcium carbonate (TUMS) 500 MG chewable tablet Take 1 chew tab by mouth 3 times daily as needed for heartburn       Calcium Carbonate-Vitamin D 600-5 MG-MCG TABS Take 1 tablet by mouth 2 times daily       copper gluconate 2 MG tablet Take 1 tablet (2 mg) by mouth daily (Patient not taking: Reported on 10/9/2023) 90 tablet 1     cyanocobalamin (CYANOCOBALAMIN) 1000 MCG/ML injection Inject 1,000 mcg into the muscle every 14 days Next dose 10/15/23       Ferrous Sulfate 324 MG TBEC Take 1 tablet by mouth in the AM and 2 tablets at bedtime.       lipase-protease-amylase (CREON) 43317-67099-228163 units CPEP per EC capsule Take 3 capsules by mouth 3 times daily (with meals) (Patient not taking: Reported on 10/24/2023) 270 capsule 3     multivitamin w/minerals (MULTI-VITAMIN) tablet Take 1 tablet by mouth daily       omeprazole (PRILOSEC) 40 MG DR capsule Take 1 capsule (40 mg) by mouth daily (Patient not taking: Reported on 10/24/2023) 90 capsule 3     oxyCODONE (ROXICODONE) 5 MG tablet Take 1-2 tablets (5-10 mg) by mouth every 6 hours as needed for moderate pain (Patient not taking: Reported on 10/31/2023) 20 tablet 0      polyethylene glycol (MIRALAX) 17 GM/Dose powder Take 17 g by mouth daily as needed 510 g 0     senna-docusate (SENOKOT-S/PERICOLACE) 8.6-50 MG tablet Take 1 tablet by mouth nightly as needed for constipation 30 tablet 0     sucralfate (CARAFATE) 1 GM/10ML suspension Take 10 mLs (1 g) by mouth 4 times daily as needed (epigastric pain, regurgitation) (Patient not taking: Reported on 10/24/2023) 414 mL 3     VITAMIN A PO Take 1 tablet by mouth daily       vitamin C (ASCORBIC ACID) 250 MG tablet Take 1 tablet (250 mg) by mouth 3 times daily 90 tablet 4     Vitamin D 12.5 MCG/0.25ML LIQD Take 2.5 mLs by mouth daily Goal is 5,000 international unit(s) of water miscible vitamin D 3 daily (125mcg) (Patient not taking: Reported on 10/24/2023) 240 mL 3     vitamin D2 (ERGOCALCIFEROL) 40983 units (1250 mcg) capsule Take 50,000 Units by mouth daily       vitamin D3 (CHOLECALCIFEROL) 125 MCG (5000 UT) tablet Take 5,000 Units by mouth daily       vitamin E (TOCOPHEROL) 400 units (180 mg) capsule Take 1 capsule (400 Units) by mouth daily 90 capsule 3     Zinc 100 MG TABS Take 1 tablet (50 mg) by mouth daily 90 tablet 1               Discharge Medications:     Current Discharge Medication List        CONTINUE these medications which have NOT CHANGED    Details   acetaminophen (TYLENOL) 325 MG tablet Take 2 tablets (650 mg) by mouth every 4 hours as needed for other (For optimal non-opioid multimodal pain management to improve pain control and physical function.)  Qty: 40 tablet, Refills: 0    Associated Diagnoses: S/P gastric bypass; S/P bariatric surgery      calcium carbonate (TUMS) 500 MG chewable tablet Take 1 chew tab by mouth 3 times daily as needed for heartburn      Calcium Carbonate-Vitamin D 600-5 MG-MCG TABS Take 1 tablet by mouth 2 times daily      copper gluconate 2 MG tablet Take 1 tablet (2 mg) by mouth daily  Qty: 90 tablet, Refills: 1    Associated Diagnoses: S/P bariatric surgery; Intestinal malabsorption,  unspecified type; Vitamin deficiency      cyanocobalamin (CYANOCOBALAMIN) 1000 MCG/ML injection Inject 1,000 mcg into the muscle every 14 days Next dose 10/15/23      Ferrous Sulfate 324 MG TBEC Take 1 tablet by mouth in the AM and 2 tablets at bedtime.      lipase-protease-amylase (CREON) 68816-34046-572188 units CPEP per EC capsule Take 3 capsules by mouth 3 times daily (with meals)  Qty: 270 capsule, Refills: 3    Associated Diagnoses: S/P bariatric surgery; Intestinal malabsorption, unspecified type; Vitamin deficiency; Hx of gastric ulcer; Gastroesophageal reflux disease with esophagitis, unspecified whether hemorrhage; Mild protein-calorie malnutrition (H24)      multivitamin w/minerals (MULTI-VITAMIN) tablet Take 1 tablet by mouth daily      omeprazole (PRILOSEC) 40 MG DR capsule Take 1 capsule (40 mg) by mouth daily  Qty: 90 capsule, Refills: 3    Associated Diagnoses: S/P bariatric surgery; Hx of gastric ulcer; Gastroesophageal reflux disease with esophagitis, unspecified whether hemorrhage      oxyCODONE (ROXICODONE) 5 MG tablet Take 1-2 tablets (5-10 mg) by mouth every 6 hours as needed for moderate pain  Qty: 20 tablet, Refills: 0    Associated Diagnoses: S/P gastric bypass; S/P bariatric surgery      polyethylene glycol (MIRALAX) 17 GM/Dose powder Take 17 g by mouth daily as needed  Qty: 510 g, Refills: 0    Associated Diagnoses: Ileus following gastrointestinal surgery (H)      senna-docusate (SENOKOT-S/PERICOLACE) 8.6-50 MG tablet Take 1 tablet by mouth nightly as needed for constipation  Qty: 30 tablet, Refills: 0    Associated Diagnoses: Ileus following gastrointestinal surgery (H)      sucralfate (CARAFATE) 1 GM/10ML suspension Take 10 mLs (1 g) by mouth 4 times daily as needed (epigastric pain, regurgitation)  Qty: 414 mL, Refills: 3    Associated Diagnoses: S/P bariatric surgery; Hx of gastric ulcer; Gastroesophageal reflux disease with esophagitis, unspecified whether hemorrhage; Epigastric pain       VITAMIN A PO Take 1 tablet by mouth daily      vitamin C (ASCORBIC ACID) 250 MG tablet Take 1 tablet (250 mg) by mouth 3 times daily  Qty: 90 tablet, Refills: 4    Associated Diagnoses: S/P bariatric surgery; Intestinal malabsorption, unspecified type; Vitamin deficiency; Hyperparathyroidism, unspecified (H24)      Vitamin D 12.5 MCG/0.25ML LIQD Take 2.5 mLs by mouth daily Goal is 5,000 international unit(s) of water miscible vitamin D 3 daily (125mcg)  Qty: 240 mL, Refills: 3    Associated Diagnoses: S/P bariatric surgery; Intestinal malabsorption, unspecified type; Vitamin deficiency      vitamin D2 (ERGOCALCIFEROL) 23929 units (1250 mcg) capsule Take 50,000 Units by mouth daily      vitamin D3 (CHOLECALCIFEROL) 125 MCG (5000 UT) tablet Take 5,000 Units by mouth daily      vitamin E (TOCOPHEROL) 400 units (180 mg) capsule Take 1 capsule (400 Units) by mouth daily  Qty: 90 capsule, Refills: 3    Associated Diagnoses: S/P bariatric surgery; Hx of gastric ulcer      Zinc 100 MG TABS Take 1 tablet (50 mg) by mouth daily  Qty: 90 tablet, Refills: 1    Associated Diagnoses: S/P bariatric surgery; Intestinal malabsorption, unspecified type; Vitamin deficiency; Mild protein-calorie malnutrition (H24)                     Medications Discontinued or Adjusted During This Hospitalization:   No change           Antibiotics Prescribed at Discharge:   None prescribed           Day of Discharge Physical Exam:   Temp:  [97.5  F (36.4  C)-98.6  F (37  C)] 98.3  F (36.8  C)  Pulse:  [50-67] 57  Resp:  [13-16] 16  BP: ()/(63-88) 94/88  SpO2:  [96 %-100 %] 99 %    General: Awake, alert, no acute distress, laying comfortably in bed   CV: Warm, well perfused   Pulm: Breathing comfortably on room air   Abdomen: Soft, non-distended, appropriately tender, no rebound or guarding;  Midline incision at appropriate stage of healing   Extremities: no edema, moving all extremities spontaneously and without apparent deficit             Final Pathology Result:   No pathology submitted         Discharge Instructions and Follow-Up:   - Continue a bariatric full liquid diet. Keep track of your fluid intake.  - Continue to use laxatives like miralax for constipation.  - You may continue to have episodes of abdominal pain, although these should get better over time.  - Please reach out to the Surgery Clinic ([phone]) if you have any questions or new concerns.  - If you have sharp increase in abdominal pain, have uncontrolled nausea or vomiting, are unable to tolerate food, or develop fevers or chills, please reach out to the Surgery Clinic or return to the ED.    Follow up appointment:  - Clinic visit 10:00 AM on 11/30/2023 with Dr. Roth        Home Health Care:     Not needed           Discharge Disposition:     Discharged to home      Condition at discharge: Stable    Patient was seen, examined, and discussed on day of discharge with chief resident, who discussed with staff surgeon Dr. Roth.    Shari Malave MD  General Surgery, PGY1

## 2023-11-10 NOTE — PLAN OF CARE
"Goal Outcome Evaluation:    - diagnostic tests and consults completed and resulted: In progress    - vital signs normal or at patient baseline: Met  BP 98/63 (BP Location: Left arm)   Pulse 50   Temp 98.4  F (36.9  C) (Oral)   Resp 16   Ht 1.753 m (5' 9\")   Wt 68 kg (149 lb 14.6 oz)   SpO2 98%   BMI 22.14 kg/m      - passing gas: Not met    - tolerating diet: In progress    - abdominal pain improving: In progress    - nausea improving: In progress    Nurse to notify provider when observation goals have been met and patient is ready for discharge.   "

## 2023-11-10 NOTE — PROVIDER NOTIFICATION
Provider text paged Re: Patient has orders for Bariatric full liquid diet but she states she was told she is CLD. Please clarify because patient is not able to order breakfast. Please clarify her plan too. Thanks

## 2023-11-10 NOTE — PROGRESS NOTES
I assessed the patient bedside for follow up of abdominal pain.    She said her pain and nausea has got better since admission at rest, but still complaining of nausea when walking around and getting up. She also she hasn't been passing gas since last surgery, except the diarrhea she had with the gastrograffin challenge given last admission.     Abdomen-soft, non distended, non guarded, well healed abdominal incisions, mildly tender on LUQ.     Discussed with the patient there is no current plan for surgery in this admission. Patient and her spouse wanted to know further plan for her. Will communicate to the day team.    Nicolle BURCHBS  PGY1 General Surgery  [facility]  [facility]

## 2023-11-10 NOTE — PROVIDER NOTIFICATION
Provider text paged Re: Patient wants to discharge now and not wait for 1pm. Patient also needs a work note. Thanks

## 2023-11-10 NOTE — PLAN OF CARE
Goal Outcome Evaluation:  -diagnostic tests and consults completed and resulted: Not met   -vital signs normal or at patient baseline: Met   - passing gas: Not met   - tolerating diet: Not met   - abdominal pain improving: Not met   - nausea improving: In progress

## 2023-11-10 NOTE — PLAN OF CARE
Goal Outcome Evaluation:    - diagnostic tests and consults completed and resulted: In progress    - vital signs normal or at patient baseline: Met  /66 (BP Location: Right arm)   Pulse 59   Temp 98.6  F (37  C) (Oral)   Resp 16   SpO2 100%     - passing gas: Not met    - tolerating diet: In progress    - abdominal pain improving: In progress, pt states she can feel the pain there but refuses PRN analgesics.    - nausea improving: In progress, pt stated she felt nauseous after getting out of bed, given PRN IV zofran with relief.    Nurse to notify provider when observation goals have been met and patient is ready for discharge.

## 2023-11-13 NOTE — TELEPHONE ENCOUNTER
Patient concerned that she was told she had a possible small bowel obstruction and then was told she did not. She had another episode of abdominal pain 2 nights ago and wants to know why she is still having these episodes.   She has been on a clear liquid diet and is not passing gas and has not had a BM since surgery. Is taking Miralax in the am and 1 Senna at night.   Spoke with Fariha Aranda and advised patient to increase Senna and Miralax to bid. To add Milk of Magnesia daily until having BM. Increase fluid intake to 60 ounces per day and to try to meet protein goal of 60 grams per day.   Patient scheduled to see Dr Roth 11/16 via video to review CT, address concerns and plan going forward. Will continue on clear liquid diet until then. Patient verbalized understanding and agrees with plan.

## 2023-11-16 ENCOUNTER — MYC MEDICAL ADVICE (OUTPATIENT)
Dept: ENDOCRINOLOGY | Facility: CLINIC | Age: 43
End: 2023-11-16

## 2023-11-20 ENCOUNTER — APPOINTMENT (OUTPATIENT)
Dept: CT IMAGING | Facility: CLINIC | Age: 43
End: 2023-11-20
Attending: EMERGENCY MEDICINE
Payer: COMMERCIAL

## 2023-11-20 ENCOUNTER — HOSPITAL ENCOUNTER (OUTPATIENT)
Facility: CLINIC | Age: 43
Setting detail: OBSERVATION
Discharge: HOME | End: 2023-11-23
Attending: EMERGENCY MEDICINE | Admitting: SURGERY
Payer: COMMERCIAL

## 2023-11-20 ENCOUNTER — TELEPHONE (OUTPATIENT)
Dept: SURGERY | Facility: CLINIC | Age: 43
End: 2023-11-20
Payer: COMMERCIAL

## 2023-11-20 DIAGNOSIS — R10.84 GENERALIZED ABDOMINAL PAIN: ICD-10-CM

## 2023-11-20 LAB
ALBUMIN SERPL BCG-MCNC: 4.3 G/DL (ref 3.5–5.2)
ALP SERPL-CCNC: 99 U/L (ref 40–150)
ALT SERPL W P-5'-P-CCNC: 17 U/L (ref 0–50)
ANION GAP SERPL CALCULATED.3IONS-SCNC: 13 MMOL/L (ref 7–15)
AST SERPL W P-5'-P-CCNC: 22 U/L (ref 0–45)
BASOPHILS # BLD AUTO: 0 10E3/UL (ref 0–0.2)
BASOPHILS NFR BLD AUTO: 1 %
BILIRUB SERPL-MCNC: 0.5 MG/DL
BUN SERPL-MCNC: 20.8 MG/DL (ref 6–20)
CALCIUM SERPL-MCNC: 9.5 MG/DL (ref 8.6–10)
CHLORIDE SERPL-SCNC: 103 MMOL/L (ref 98–107)
CREAT SERPL-MCNC: 0.71 MG/DL (ref 0.51–0.95)
DEPRECATED HCO3 PLAS-SCNC: 25 MMOL/L (ref 22–29)
EGFRCR SERPLBLD CKD-EPI 2021: >90 ML/MIN/1.73M2
EOSINOPHIL # BLD AUTO: 0.1 10E3/UL (ref 0–0.7)
EOSINOPHIL NFR BLD AUTO: 1 %
ERYTHROCYTE [DISTWIDTH] IN BLOOD BY AUTOMATED COUNT: 11.6 % (ref 10–15)
GLUCOSE SERPL-MCNC: 125 MG/DL (ref 70–99)
HCG SERPL QL: NEGATIVE
HCT VFR BLD AUTO: 38.8 % (ref 35–47)
HGB BLD-MCNC: 13.2 G/DL (ref 11.7–15.7)
IMM GRANULOCYTES # BLD: 0 10E3/UL
IMM GRANULOCYTES NFR BLD: 1 %
LIPASE SERPL-CCNC: 68 U/L (ref 13–60)
LYMPHOCYTES # BLD AUTO: 1.8 10E3/UL (ref 0.8–5.3)
LYMPHOCYTES NFR BLD AUTO: 27 %
MCH RBC QN AUTO: 32.3 PG (ref 26.5–33)
MCHC RBC AUTO-ENTMCNC: 34 G/DL (ref 31.5–36.5)
MCV RBC AUTO: 95 FL (ref 78–100)
MONOCYTES # BLD AUTO: 0.7 10E3/UL (ref 0–1.3)
MONOCYTES NFR BLD AUTO: 11 %
NEUTROPHILS # BLD AUTO: 4 10E3/UL (ref 1.6–8.3)
NEUTROPHILS NFR BLD AUTO: 59 %
NRBC # BLD AUTO: 0 10E3/UL
NRBC BLD AUTO-RTO: 0 /100
PLATELET # BLD AUTO: 279 10E3/UL (ref 150–450)
POTASSIUM SERPL-SCNC: 3.9 MMOL/L (ref 3.4–5.3)
PROT SERPL-MCNC: 7.1 G/DL (ref 6.4–8.3)
RBC # BLD AUTO: 4.09 10E6/UL (ref 3.8–5.2)
SODIUM SERPL-SCNC: 141 MMOL/L (ref 135–145)
WBC # BLD AUTO: 6.7 10E3/UL (ref 4–11)

## 2023-11-20 PROCEDURE — 258N000003 HC RX IP 258 OP 636: Performed by: EMERGENCY MEDICINE

## 2023-11-20 PROCEDURE — 83690 ASSAY OF LIPASE: CPT | Performed by: EMERGENCY MEDICINE

## 2023-11-20 PROCEDURE — 80053 COMPREHEN METABOLIC PANEL: CPT | Performed by: EMERGENCY MEDICINE

## 2023-11-20 PROCEDURE — 36415 COLL VENOUS BLD VENIPUNCTURE: CPT | Performed by: EMERGENCY MEDICINE

## 2023-11-20 PROCEDURE — 250N000011 HC RX IP 250 OP 636: Performed by: EMERGENCY MEDICINE

## 2023-11-20 PROCEDURE — 84703 CHORIONIC GONADOTROPIN ASSAY: CPT | Performed by: EMERGENCY MEDICINE

## 2023-11-20 PROCEDURE — 96374 THER/PROPH/DIAG INJ IV PUSH: CPT | Performed by: EMERGENCY MEDICINE

## 2023-11-20 PROCEDURE — 250N000009 HC RX 250: Performed by: EMERGENCY MEDICINE

## 2023-11-20 PROCEDURE — 74177 CT ABD & PELVIS W/CONTRAST: CPT

## 2023-11-20 PROCEDURE — 96361 HYDRATE IV INFUSION ADD-ON: CPT | Mod: 59 | Performed by: EMERGENCY MEDICINE

## 2023-11-20 PROCEDURE — 99285 EMERGENCY DEPT VISIT HI MDM: CPT | Performed by: EMERGENCY MEDICINE

## 2023-11-20 PROCEDURE — 85025 COMPLETE CBC W/AUTO DIFF WBC: CPT | Performed by: EMERGENCY MEDICINE

## 2023-11-20 PROCEDURE — 250N000011 HC RX IP 250 OP 636: Mod: JZ | Performed by: EMERGENCY MEDICINE

## 2023-11-20 PROCEDURE — 96375 TX/PRO/DX INJ NEW DRUG ADDON: CPT | Performed by: EMERGENCY MEDICINE

## 2023-11-20 PROCEDURE — 99285 EMERGENCY DEPT VISIT HI MDM: CPT | Mod: 25 | Performed by: EMERGENCY MEDICINE

## 2023-11-20 PROCEDURE — 74177 CT ABD & PELVIS W/CONTRAST: CPT | Mod: 26 | Performed by: RADIOLOGY

## 2023-11-20 RX ORDER — OXYCODONE HYDROCHLORIDE 5 MG/1
5 TABLET ORAL ONCE
Status: DISCONTINUED | OUTPATIENT
Start: 2023-11-20 | End: 2023-11-20

## 2023-11-20 RX ORDER — ONDANSETRON 2 MG/ML
4 INJECTION INTRAMUSCULAR; INTRAVENOUS ONCE
Status: COMPLETED | OUTPATIENT
Start: 2023-11-20 | End: 2023-11-20

## 2023-11-20 RX ORDER — HYDROMORPHONE HYDROCHLORIDE 1 MG/ML
0.5 INJECTION, SOLUTION INTRAMUSCULAR; INTRAVENOUS; SUBCUTANEOUS ONCE
Status: COMPLETED | OUTPATIENT
Start: 2023-11-20 | End: 2023-11-20

## 2023-11-20 RX ORDER — HYDROMORPHONE HYDROCHLORIDE 1 MG/ML
0.5 INJECTION, SOLUTION INTRAMUSCULAR; INTRAVENOUS; SUBCUTANEOUS ONCE
Status: COMPLETED | OUTPATIENT
Start: 2023-11-20 | End: 2023-11-21

## 2023-11-20 RX ORDER — IOPAMIDOL 755 MG/ML
92 INJECTION, SOLUTION INTRAVASCULAR ONCE
Status: COMPLETED | OUTPATIENT
Start: 2023-11-20 | End: 2023-11-20

## 2023-11-20 RX ADMIN — ONDANSETRON 4 MG: 2 INJECTION INTRAMUSCULAR; INTRAVENOUS at 22:07

## 2023-11-20 RX ADMIN — SODIUM CHLORIDE 1000 ML: 9 INJECTION, SOLUTION INTRAVENOUS at 22:07

## 2023-11-20 RX ADMIN — HYDROMORPHONE HYDROCHLORIDE 0.5 MG: 1 INJECTION, SOLUTION INTRAMUSCULAR; INTRAVENOUS; SUBCUTANEOUS at 22:23

## 2023-11-20 RX ADMIN — SODIUM CHLORIDE, PRESERVATIVE FREE 73 ML: 5 INJECTION INTRAVENOUS at 22:59

## 2023-11-20 RX ADMIN — IOPAMIDOL 92 ML: 755 INJECTION, SOLUTION INTRAVENOUS at 22:59

## 2023-11-20 ASSESSMENT — ACTIVITIES OF DAILY LIVING (ADL): ADLS_ACUITY_SCORE: 35

## 2023-11-20 NOTE — TELEPHONE ENCOUNTER
General Call    Contacts         Type Contact Phone/Fax    11/20/2023 03:18 PM CST Phone (Incoming) SparksJuanis garvey (Self) [phone] (H)          Reason for Call:  Please call asap... has been ER a few times since procedure. Not happy.        Could we send this information to you in [employer] or would you prefer to receive a phone call?:   Patient would prefer a phone call   Okay to leave a detailed message?: Yes at Cell number on file:    Telephone Information:   Mobile [phone]

## 2023-11-21 PROBLEM — R10.84 GENERALIZED ABDOMINAL PAIN: Status: ACTIVE | Noted: 2023-11-21

## 2023-11-21 LAB
ALBUMIN SERPL BCG-MCNC: 3.8 G/DL (ref 3.5–5.2)
ALBUMIN UR-MCNC: 20 MG/DL
ALP SERPL-CCNC: 80 U/L (ref 40–150)
ALT SERPL W P-5'-P-CCNC: 16 U/L (ref 0–50)
AMORPH CRY #/AREA URNS HPF: ABNORMAL /HPF
ANION GAP SERPL CALCULATED.3IONS-SCNC: 9 MMOL/L (ref 7–15)
APPEARANCE UR: CLEAR
AST SERPL W P-5'-P-CCNC: 21 U/L (ref 0–45)
BILIRUB SERPL-MCNC: 0.4 MG/DL
BILIRUB UR QL STRIP: NEGATIVE
BUN SERPL-MCNC: 15.5 MG/DL (ref 6–20)
CALCIUM SERPL-MCNC: 8.8 MG/DL (ref 8.6–10)
CHLORIDE SERPL-SCNC: 106 MMOL/L (ref 98–107)
COLOR UR AUTO: ABNORMAL
CREAT SERPL-MCNC: 0.53 MG/DL (ref 0.51–0.95)
DEPRECATED HCO3 PLAS-SCNC: 24 MMOL/L (ref 22–29)
EGFRCR SERPLBLD CKD-EPI 2021: >90 ML/MIN/1.73M2
ERYTHROCYTE [DISTWIDTH] IN BLOOD BY AUTOMATED COUNT: 11.5 % (ref 10–15)
GLUCOSE SERPL-MCNC: 90 MG/DL (ref 70–99)
GLUCOSE UR STRIP-MCNC: NEGATIVE MG/DL
HCT VFR BLD AUTO: 35.5 % (ref 35–47)
HGB BLD-MCNC: 11.8 G/DL (ref 11.7–15.7)
HGB UR QL STRIP: NEGATIVE
KETONES UR STRIP-MCNC: 80 MG/DL
LACTATE SERPL-SCNC: 0.9 MMOL/L (ref 0.7–2)
LEUKOCYTE ESTERASE UR QL STRIP: NEGATIVE
MCH RBC QN AUTO: 32.3 PG (ref 26.5–33)
MCHC RBC AUTO-ENTMCNC: 33.2 G/DL (ref 31.5–36.5)
MCV RBC AUTO: 97 FL (ref 78–100)
MUCOUS THREADS #/AREA URNS LPF: PRESENT /LPF
NITRATE UR QL: NEGATIVE
PH UR STRIP: 7 [PH] (ref 5–7)
PLATELET # BLD AUTO: 242 10E3/UL (ref 150–450)
POTASSIUM SERPL-SCNC: 4 MMOL/L (ref 3.4–5.3)
PROT SERPL-MCNC: 6.1 G/DL (ref 6.4–8.3)
RBC # BLD AUTO: 3.65 10E6/UL (ref 3.8–5.2)
RBC URINE: 5 /HPF
SODIUM SERPL-SCNC: 139 MMOL/L (ref 135–145)
SP GR UR STRIP: 1.01 (ref 1–1.03)
SQUAMOUS EPITHELIAL: 5 /HPF
UROBILINOGEN UR STRIP-MCNC: NORMAL MG/DL
WBC # BLD AUTO: 6.4 10E3/UL (ref 4–11)
WBC URINE: 2 /HPF

## 2023-11-21 PROCEDURE — G0378 HOSPITAL OBSERVATION PER HR: HCPCS

## 2023-11-21 PROCEDURE — 250N000013 HC RX MED GY IP 250 OP 250 PS 637: Performed by: SURGERY

## 2023-11-21 PROCEDURE — 250N000011 HC RX IP 250 OP 636: Mod: JZ

## 2023-11-21 PROCEDURE — 96361 HYDRATE IV INFUSION ADD-ON: CPT

## 2023-11-21 PROCEDURE — 81001 URINALYSIS AUTO W/SCOPE: CPT | Performed by: EMERGENCY MEDICINE

## 2023-11-21 PROCEDURE — 96375 TX/PRO/DX INJ NEW DRUG ADDON: CPT | Performed by: EMERGENCY MEDICINE

## 2023-11-21 PROCEDURE — 96361 HYDRATE IV INFUSION ADD-ON: CPT | Performed by: EMERGENCY MEDICINE

## 2023-11-21 PROCEDURE — 83605 ASSAY OF LACTIC ACID: CPT

## 2023-11-21 PROCEDURE — 80053 COMPREHEN METABOLIC PANEL: CPT

## 2023-11-21 PROCEDURE — 258N000003 HC RX IP 258 OP 636

## 2023-11-21 PROCEDURE — 96375 TX/PRO/DX INJ NEW DRUG ADDON: CPT

## 2023-11-21 PROCEDURE — C9113 INJ PANTOPRAZOLE SODIUM, VIA: HCPCS | Mod: JZ

## 2023-11-21 PROCEDURE — 85027 COMPLETE CBC AUTOMATED: CPT

## 2023-11-21 PROCEDURE — 250N000013 HC RX MED GY IP 250 OP 250 PS 637

## 2023-11-21 PROCEDURE — 250N000011 HC RX IP 250 OP 636: Mod: JZ | Performed by: EMERGENCY MEDICINE

## 2023-11-21 PROCEDURE — 96376 TX/PRO/DX INJ SAME DRUG ADON: CPT | Performed by: EMERGENCY MEDICINE

## 2023-11-21 PROCEDURE — 36415 COLL VENOUS BLD VENIPUNCTURE: CPT

## 2023-11-21 RX ORDER — POLYETHYLENE GLYCOL 3350 17 G/17G
34 POWDER, FOR SOLUTION ORAL 2 TIMES DAILY
Status: DISCONTINUED | OUTPATIENT
Start: 2023-11-21 | End: 2023-11-23 | Stop reason: HOSPADM

## 2023-11-21 RX ORDER — MORPHINE SULFATE 4 MG/ML
4 INJECTION, SOLUTION INTRAMUSCULAR; INTRAVENOUS ONCE
Status: COMPLETED | OUTPATIENT
Start: 2023-11-21 | End: 2023-11-21

## 2023-11-21 RX ORDER — METOCLOPRAMIDE HYDROCHLORIDE 5 MG/ML
5 INJECTION INTRAMUSCULAR; INTRAVENOUS ONCE
Status: COMPLETED | OUTPATIENT
Start: 2023-11-21 | End: 2023-11-21

## 2023-11-21 RX ORDER — CALCIUM CARBONATE 500 MG/1
1000 TABLET, CHEWABLE ORAL 4 TIMES DAILY PRN
Status: DISCONTINUED | OUTPATIENT
Start: 2023-11-21 | End: 2023-11-23 | Stop reason: HOSPADM

## 2023-11-21 RX ORDER — HYDROMORPHONE HCL IN WATER/PF 6 MG/30 ML
0.2 PATIENT CONTROLLED ANALGESIA SYRINGE INTRAVENOUS
Status: DISCONTINUED | OUTPATIENT
Start: 2023-11-21 | End: 2023-11-23 | Stop reason: HOSPADM

## 2023-11-21 RX ORDER — SODIUM CHLORIDE, SODIUM LACTATE, POTASSIUM CHLORIDE, CALCIUM CHLORIDE 600; 310; 30; 20 MG/100ML; MG/100ML; MG/100ML; MG/100ML
INJECTION, SOLUTION INTRAVENOUS CONTINUOUS
Status: DISCONTINUED | OUTPATIENT
Start: 2023-11-21 | End: 2023-11-23 | Stop reason: HOSPADM

## 2023-11-21 RX ORDER — ONDANSETRON 2 MG/ML
4 INJECTION INTRAMUSCULAR; INTRAVENOUS EVERY 6 HOURS PRN
Status: DISCONTINUED | OUTPATIENT
Start: 2023-11-21 | End: 2023-11-23 | Stop reason: HOSPADM

## 2023-11-21 RX ORDER — ZINC GLUCONATE 50 MG
50 TABLET ORAL DAILY
COMMUNITY
Start: 2023-11-02

## 2023-11-21 RX ORDER — SODIUM CHLORIDE, SODIUM LACTATE, POTASSIUM CHLORIDE, CALCIUM CHLORIDE 600; 310; 30; 20 MG/100ML; MG/100ML; MG/100ML; MG/100ML
INJECTION, SOLUTION INTRAVENOUS CONTINUOUS
Status: DISCONTINUED | OUTPATIENT
Start: 2023-11-21 | End: 2023-11-21

## 2023-11-21 RX ORDER — POLYETHYLENE GLYCOL 3350 17 G/17G
17 POWDER, FOR SOLUTION ORAL 2 TIMES DAILY
COMMUNITY

## 2023-11-21 RX ORDER — LIDOCAINE 40 MG/G
CREAM TOPICAL
Status: DISCONTINUED | OUTPATIENT
Start: 2023-11-21 | End: 2023-11-23 | Stop reason: HOSPADM

## 2023-11-21 RX ORDER — AMOXICILLIN 250 MG
1 CAPSULE ORAL 2 TIMES DAILY PRN
Status: CANCELLED | OUTPATIENT
Start: 2023-11-21

## 2023-11-21 RX ORDER — AMOXICILLIN 250 MG
2 CAPSULE ORAL 2 TIMES DAILY PRN
Status: CANCELLED | OUTPATIENT
Start: 2023-11-21

## 2023-11-21 RX ORDER — ONDANSETRON 4 MG/1
4 TABLET, ORALLY DISINTEGRATING ORAL EVERY 6 HOURS PRN
Status: DISCONTINUED | OUTPATIENT
Start: 2023-11-21 | End: 2023-11-23 | Stop reason: HOSPADM

## 2023-11-21 RX ORDER — OXYCODONE HYDROCHLORIDE 5 MG/1
5 TABLET ORAL EVERY 4 HOURS PRN
Status: DISCONTINUED | OUTPATIENT
Start: 2023-11-21 | End: 2023-11-23 | Stop reason: HOSPADM

## 2023-11-21 RX ORDER — HYDROMORPHONE HYDROCHLORIDE 1 MG/ML
0.5 INJECTION, SOLUTION INTRAMUSCULAR; INTRAVENOUS; SUBCUTANEOUS
Status: DISCONTINUED | OUTPATIENT
Start: 2023-11-21 | End: 2023-11-21

## 2023-11-21 RX ORDER — AMOXICILLIN 250 MG
2 CAPSULE ORAL AT BEDTIME
COMMUNITY

## 2023-11-21 RX ADMIN — OXYCODONE HYDROCHLORIDE 5 MG: 5 TABLET ORAL at 09:57

## 2023-11-21 RX ADMIN — HYDROMORPHONE HYDROCHLORIDE 0.5 MG: 1 INJECTION, SOLUTION INTRAMUSCULAR; INTRAVENOUS; SUBCUTANEOUS at 00:55

## 2023-11-21 RX ADMIN — SODIUM CHLORIDE, POTASSIUM CHLORIDE, SODIUM LACTATE AND CALCIUM CHLORIDE: 600; 310; 30; 20 INJECTION, SOLUTION INTRAVENOUS at 21:33

## 2023-11-21 RX ADMIN — METOCLOPRAMIDE HYDROCHLORIDE 5 MG: 5 INJECTION INTRAMUSCULAR; INTRAVENOUS at 00:37

## 2023-11-21 RX ADMIN — HYDROMORPHONE HYDROCHLORIDE 0.5 MG: 1 INJECTION, SOLUTION INTRAMUSCULAR; INTRAVENOUS; SUBCUTANEOUS at 00:58

## 2023-11-21 RX ADMIN — PANTOPRAZOLE SODIUM 40 MG: 40 INJECTION, POWDER, FOR SOLUTION INTRAVENOUS at 09:58

## 2023-11-21 RX ADMIN — SODIUM CHLORIDE, POTASSIUM CHLORIDE, SODIUM LACTATE AND CALCIUM CHLORIDE: 600; 310; 30; 20 INJECTION, SOLUTION INTRAVENOUS at 10:00

## 2023-11-21 RX ADMIN — MORPHINE SULFATE 4 MG: 4 INJECTION INTRAVENOUS at 02:29

## 2023-11-21 RX ADMIN — POLYETHYLENE GLYCOL 3350 34 G: 17 POWDER, FOR SOLUTION ORAL at 19:32

## 2023-11-21 RX ADMIN — HYDROMORPHONE HYDROCHLORIDE 0.5 MG: 1 INJECTION, SOLUTION INTRAMUSCULAR; INTRAVENOUS; SUBCUTANEOUS at 00:22

## 2023-11-21 ASSESSMENT — ACTIVITIES OF DAILY LIVING (ADL)
ADLS_ACUITY_SCORE: 35

## 2023-11-21 NOTE — UTILIZATION REVIEW
"    Admission Status; Secondary Review Determination         Under the authority of the Utilization Management Committee, the utilization review process indicated a secondary review on the above patient.  The review outcome is based on review of the medical records, discussions with staff, and applying clinical experience noted on the date of the review.          (x) Observation Status Appropriate - This patient does not meet hospital inpatient criteria and is placed in observation status. If this patient's primary payer is Medicare and was admitted as an inpatient, Condition Code 44 should be used and patient status changed to \"observation\".     RATIONALE FOR DETERMINATION   43-year-old female with a history of a duodenal switch procedure complicated by malnutrition requiring revision, presents with worsening abdominal pain, nausea, and vomiting over three days. She reports minimal stool output and an inability to pass gas. While her labs and vitals are within normal limits, she exhibits tenderness on abdominal palpation but does not present with peritonitis. There is no indication for immediate surgical intervention per bariatric surgery. However, the patient's symptoms and surgical history warrant inpatient admission for further investigation of the abdominal pain source. The recommended management plan includes maintenance IV fluids, pain control, proton pump inhibitors (PPI), nil per os (NPO), and serial abdominal examinations.  The severity of illness, intensity of service provided, expected LOS and risk for adverse outcome make the care appropriate for further observation; however, doesn't meet criteria for hospital inpatient admission. Dr Bowling notified of this determination.    This document was produced using voice recognition software.      The information on this document is developed by the utilization review team in order for the business office to ensure compliance.  This only denotes the " appropriateness of proper admission status and does not reflect the quality of care rendered.         The definitions of Inpatient Status and Observation Status used in making the determination above are those provided in the CMS Coverage Manual, Chapter 1 and Chapter 6, section 70.4.      Sincerely,     ALPA ESPINAL MD    System Medical Director  Utilization Management  [facility].

## 2023-11-21 NOTE — TELEPHONE ENCOUNTER
"Patient currently in the ED. She called because she wants to talk to someone and doesn't want to leave until she gets answers about what is going on. States she talked to a resident in surgery this morning but wants to talk to Dr Roth.   Patient had an appointment scheduled with Dr Roth last Thursday 11/16 and she cancelled it \"because I wanted a second opinion\" but [facility] surgery wouldn't see her. Informed patient that Dr Roth was more than willing to see her last week or any time to address her questions and concerns.   Call was dropped, lost connection with patient.     Will inform Dr Roth of above.   "

## 2023-11-21 NOTE — CONSULTS
Bariatric Surgery Consult Note  11/21/2023    Reason for consult: c/f Internal hernia  Consult requested by: [facility] ED      ASSESSMENT: 43F with hx duodenal switch procedure in 2016 c/b malnutrition requiring revision on 10/23/2023 c/b post-operative abdominal pain presenting to the ED with 3 days of worsening abdominal pain, nausea, and vomiting. Minimal stool output, not passing gas. Labs and vitals WNL. Tender to palpation on exam but not peritonitic. Patient does not require immediate intervention, however admission with further work up of source of abdominal pain is warranted.    RECOMMENDATIONS:    - No acute surgical invention indicated  - Admit to General Surgery  - mIVF, pain control, PPI  - NPO  - Serial abdominal exams      Discussed with chief resident Dr. Chris and staff Dr. Bowling.    Juan Mccall MD  Surgery Resident      =======================    History of Present Illness:    Juanis Sparks is a 43 year old female with hx GERD and laparoscopic gastric bypass with duodenal switch (3/29/2016 for bilious regurgitation) now s/p ex lap, Karen, small bowel resection, and redo duodenal switch on 10/23/2023 who presents to the ED with abdominal pain, nausea, and vomiting which have worsened since Friday (11/17/2023). The patient states that since she was last discharged from [facility] on 11/10/2023, she has had intermittent abdominal pain. Her abdominal pain has worsened starting on Friday and now radiates to her back. She is exquisitely tender to even light touch and her movement is limited. She has tested advancing her diet to soft foods without any issues so far. She states that she has not been passing gas for several days and that her last bowel movement was yesterday, but it was small and described as tarry stool. She started vomiting earlier today and describes her vomit as having black and white flakes in it.    Past Medical History:  Past Medical History:   Diagnosis Date    Anemia      Gastroesophageal reflux disease with esophagitis     Hyperparathyroidism (H24)     Mild protein malnutrition (H24)     Osteoporosis     Postsurgical malabsorption        Past Surgical History  Past Surgical History:   Procedure Laterality Date    BYPASS GASTRIC DUODENAL SWITCH N/A 10/23/2023    Procedure: small bowel resection; redo enteroenterostomy  and closure of mesenteric defect;  Surgeon: Doyle Roth MD;  Location: [facility] OR    ESOPHAGOSCOPY, GASTROSCOPY, DUODENOSCOPY (EGD), COMBINED N/A 06/14/2023    Procedure: ESOPHAGOGASTRODUODENOSCOPY, WITH BIOPSY;  Surgeon: Doyle Roth MD;  Location: [facility] GI    GASTRECTOMY, SLEEVE, LAPAROSCOPIC, WITH DUODENAL SWITCH N/A 03/29/2016    Dr. Robin Ramos, Ethel BARILLAS; loop DS with 300cm efferent; 40Fr sleeve.    LAPAROTOMY, LYSIS ADHESIONS, COMBINED N/A 10/23/2023    Procedure: LAPAROTOMY, EXPLORATORY, WITH LYSIS OF ADHESIONS;  Surgeon: Doyle Roth MD;  Location: [facility] OR    DE LAPAROSCOPIC ENTEROENTEROSTOMY, ANASTOMOSIS OF INTESTINE N/A 06/21/2016    bile reflux and stricture of DI; converted to 50cm alimentary (Mary Ann) and 250 common channel       Family History:  No family history on file.    Social History:  Social History     Social History Narrative    Not on file        Medications:  Current Outpatient Medications   Medication Sig Dispense Refill    acetaminophen (TYLENOL) 325 MG tablet Take 2 tablets (650 mg) by mouth every 4 hours as needed for other (For optimal non-opioid multimodal pain management to improve pain control and physical function.) 40 tablet 0    calcium carbonate (TUMS) 500 MG chewable tablet Take 1 chew tab by mouth 3 times daily as needed for heartburn      Calcium Carbonate-Vitamin D 600-5 MG-MCG TABS Take 1 tablet by mouth 2 times daily      copper gluconate 2 MG tablet Take 1 tablet (2 mg) by mouth daily (Patient not taking: Reported on 10/9/2023) 90 tablet 1    cyanocobalamin (CYANOCOBALAMIN) 1000 MCG/ML injection Inject  1,000 mcg into the muscle every 14 days Next dose 10/15/23      Ferrous Sulfate 324 MG TBEC Take 1 tablet by mouth in the AM and 2 tablets at bedtime.      lipase-protease-amylase (CREON) 12334-35076-244199 units CPEP per EC capsule Take 3 capsules by mouth 3 times daily (with meals) (Patient not taking: Reported on 10/24/2023) 270 capsule 3    multivitamin w/minerals (MULTI-VITAMIN) tablet Take 1 tablet by mouth daily      omeprazole (PRILOSEC) 40 MG DR capsule Take 1 capsule (40 mg) by mouth daily (Patient not taking: Reported on 10/24/2023) 90 capsule 3    oxyCODONE (ROXICODONE) 5 MG tablet Take 1-2 tablets (5-10 mg) by mouth every 6 hours as needed for moderate pain (Patient not taking: Reported on 10/31/2023) 20 tablet 0    polyethylene glycol (MIRALAX) 17 GM/Dose powder Take 17 g by mouth daily as needed 510 g 0    senna-docusate (SENOKOT-S/PERICOLACE) 8.6-50 MG tablet Take 1 tablet by mouth nightly as needed for constipation 30 tablet 0    sucralfate (CARAFATE) 1 GM/10ML suspension Take 10 mLs (1 g) by mouth 4 times daily as needed (epigastric pain, regurgitation) (Patient not taking: Reported on 10/24/2023) 414 mL 3    VITAMIN A PO Take 1 tablet by mouth daily      vitamin C (ASCORBIC ACID) 250 MG tablet Take 1 tablet (250 mg) by mouth 3 times daily 90 tablet 4    Vitamin D 12.5 MCG/0.25ML LIQD Take 2.5 mLs by mouth daily Goal is 5,000 international unit(s) of water miscible vitamin D 3 daily (125mcg) (Patient not taking: Reported on 10/24/2023) 240 mL 3    vitamin D2 (ERGOCALCIFEROL) 52383 units (1250 mcg) capsule Take 50,000 Units by mouth daily      vitamin D3 (CHOLECALCIFEROL) 125 MCG (5000 UT) tablet Take 5,000 Units by mouth daily      vitamin E (TOCOPHEROL) 400 units (180 mg) capsule Take 1 capsule (400 Units) by mouth daily 90 capsule 3    Zinc 100 MG TABS Take 1 tablet (50 mg) by mouth daily 90 tablet 1       Allergies:  Allergies   Allergen Reactions    Amoxicillin Hives and Itching    Tetracycline  Hives    Cyanoacrylate Rash       Review of Symptoms:  A 10 point review of symptoms has been conducted and is negative except for that mentioned in the above HPI.    Physical Exam:    Temp:  [98.1  F (36.7  C)] 98.1  F (36.7  C)  Pulse:  [71-75] 71  Resp:  [15-18] 18  BP: (127)/(77) 127/77  SpO2:  [92 %-97 %] 97 %    Gen: NAD, resting comfortably  HEENT: NCAT, sclera anicteric  CV: RRR  Resp: nonlabored respirations, comfortable on room air  Abd: soft, tender to light palpation RUQ, not peritonitic, voluntary guarding. Incision well-healing  Ext: WWP w/o edema  Neuro: no focal deficits  Skin: No rashes    Labs:  CBC RESULTS:   Recent Labs   Lab Test 11/20/23  2206   WBC 6.7   RBC 4.09   HGB 13.2   HCT 38.8   MCV 95   MCH 32.3   MCHC 34.0   RDW 11.6        Lipase: 68 (145)  Lactate: 0.9    Last Comprehensive Metabolic Panel:  Sodium   Date Value Ref Range Status   11/20/2023 141 135 - 145 mmol/L Final     Comment:     Reference intervals for this test were updated on 09/26/2023 to more accurately reflect our healthy population. There may be differences in the flagging of prior results with similar values performed with this method. Interpretation of those prior results can be made in the context of the updated reference intervals.      Potassium   Date Value Ref Range Status   11/20/2023 3.9 3.4 - 5.3 mmol/L Final     Chloride   Date Value Ref Range Status   11/20/2023 103 98 - 107 mmol/L Final     Chloride (External)   Date Value Ref Range Status   06/08/2023 110 (H) 98 - 109 mmol/L Final     Carbon Dioxide (CO2)   Date Value Ref Range Status   11/20/2023 25 22 - 29 mmol/L Final     Anion Gap   Date Value Ref Range Status   11/20/2023 13 7 - 15 mmol/L Final     Glucose   Date Value Ref Range Status   11/20/2023 125 (H) 70 - 99 mg/dL Final     GLUCOSE BY METER POCT   Date Value Ref Range Status   10/23/2023 79 70 - 99 mg/dL Final     Urea Nitrogen   Date Value Ref Range Status   11/20/2023 20.8 (H) 6.0 - 20.0  mg/dL Final     Creatinine   Date Value Ref Range Status   11/20/2023 0.71 0.51 - 0.95 mg/dL Final     GFR Estimate   Date Value Ref Range Status   11/20/2023 >90 >60 mL/min/1.73m2 Final     Calcium   Date Value Ref Range Status   11/20/2023 9.5 8.6 - 10.0 mg/dL Final     Bilirubin Total   Date Value Ref Range Status   11/20/2023 0.5 <=1.2 mg/dL Final     Alkaline Phosphatase   Date Value Ref Range Status   11/20/2023 99 40 - 150 U/L Final     Comment:     Reference intervals for this test were updated on 11/14/2023 to more accurately reflect our healthy population. There may be differences in the flagging of prior results with similar values performed with this method. Interpretation of those prior results can be made in the context of the updated reference intervals.     ALT   Date Value Ref Range Status   11/20/2023 17 0 - 50 U/L Final     Comment:     Reference intervals for this test were updated on 6/12/2023 to more accurately reflect our healthy population. There may be differences in the flagging of prior results with similar values performed with this method. Interpretation of those prior results can be made in the context of the updated reference intervals.       AST   Date Value Ref Range Status   11/20/2023 22 0 - 45 U/L Final     Comment:     Reference intervals for this test were updated on 6/12/2023 to more accurately reflect our healthy population. There may be differences in the flagging of prior results with similar values performed with this method. Interpretation of those prior results can be made in the context of the updated reference intervals.         Imaging:  CT Abdomen Pelvis w Contrast   Final Result   IMPRESSION:    1.  Again noted, generalized mesenteric swirling in the left midabdomen near a small bowel anastomosis with multiple dilated small bowel loops in the central abdomen. Findings are concerning for internal hernia      2.  Stable postcholecystectomy changes.      3.  Stable  hepatic cysts. Stable right renal cysts.

## 2023-11-21 NOTE — PROGRESS NOTES
"[facility]    Progress Note - MIS/Bariatric Service       Date of Admission:  11/20/2023    Assessment & Plan: Surgery   Juanis Sparks is a 43F with PMHx significant for duodenal switch procedure in 2016 c/b malnutrition requiring revision on 10/23/2023 c/b ongoing post-operative abdominal pain with multiple recent ED visits who presented to the [facility] ED on 11/21/23  with 3 days of worsening abdominal pain, nausea, and vomiting. Had most recently been discharged on 11/10/23. Minimal stool output, not passing gas. Labs and vitals WNL on admission, CT-AP showing similar mesenteric swirling that was seen on prior scans. Tender to palpation on exam but not peritonitic.     Had long discussion with patient and  at bedside today. This was also followed by independent discussion by Dr. Bowling with patient and family himself. Patient expressed lots of frustration regarding her ongoing pain and her perceived lack of care. Had tried to seek a second opinion at [facility], however had been asked to return to [facility] due to being within 30d of her prior surgery. Stated that she \"cannot live like this\". Did discuss with her that her complex abdominal surgical history poses lots of risks and that the current stable \"mesenteric swirling\" seen on multiple repeat images is thought to be inherent anatomy from her re-do operation and not thought to be evidence of an acute internal hernia per the operating surgeon. Also discussed with her that her pain may be chronic in nature and require many months-years of ongoing evaluation and treatment. Patient and  were understanding but continued to express their frustration which was heard by the team.     - Continue NPO, mIVF, PPI  - Multimodal pain control  - Serial abdominal exams  - Will discuss patient and case with MIS surgeons        Diet: NPO for Medical/Clinical Reasons Except for: Meds    DVT Prophylaxis: Low Risk/Ambulatory " with no VTE prophylaxis indicated  Anthony Catheter: Not present  Lines: None     Drains: None     Cardiac Monitoring: None  Code Status: Full Code      Clinically Significant Risk Factors Present on Admission                                  Disposition Plan      Expected Discharge Date: 11/25/2023                 The patient's care was discussed with the Attending Physician, Dr. Bowling .    Kate Patiño MD  General Surgery, PGY-2  [facility]  Text page via [employer] Paging/Directory     ______________________________________________________________________    Interval History   Ongoing abdominal pain. Has not worsened since presentation. Not passing gas, tried an enema last night with no return.     Physical Exam   Vital Signs: Temp: 98  F (36.7  C) Temp src: Oral BP: 105/73 Pulse: 86   Resp: 16 SpO2: 98 % O2 Device: None (Room air)    Weight: 0 lbs 0 oz  Intake/Output Summary (Last 24 hours) at 11/21/2023 1052  Last data filed at 11/21/2023 0051  Gross per 24 hour   Intake 1000 ml   Output --   Net 1000 ml     AAOx3, NAD  NLB on RA  Abdomen taut, moderate guarding, tenderness to palpation      Data     I have personally reviewed the following data over the past 24 hrs:    6.4  \   11.8   / 242     141 103 20.8 (H) /  125 (H)   3.9 25 0.71 \     ALT: 17 AST: 22 AP: 99 TBILI: 0.5   ALB: 4.3 TOT PROTEIN: 7.1 LIPASE: 68 (H)     Procal: N/A CRP: N/A Lactic Acid: 0.9         Imaging results reviewed over the past 24 hrs:   Recent Results (from the past 24 hour(s))   CT Abdomen Pelvis w Contrast    Narrative    EXAM: CT ABDOMEN PELVIS W CONTRAST  LOCATION: [facility]  DATE: 11/20/2023    INDICATION: recent surgery, increased abdominal pain and tenderness  COMPARISON: 11/09/2023  TECHNIQUE: CT scan of the abdomen and pelvis was performed following injection of IV contrast. Multiplanar reformats were obtained. Dose reduction  techniques were used.  CONTRAST: iopamidol (ISOVUE 370) solution 92 mL    FINDINGS:   LOWER CHEST: Normal.    HEPATOBILIARY: Normal liver with unchanged 1.6 cm cyst in the hepatic dome. Gallbladder is surgically absent. Stable mild intrahepatic and extrahepatic biliary prominence.    PANCREAS: Normal.    SPLEEN: Normal.    ADRENAL GLANDS: Normal.    KIDNEYS/BLADDER: 1.8 cm stable simple right renal cyst does not require follow-up. No significant mass, stone, or hydronephrosis.    BOWEL: Again noted surgical clips in bilateral abdominal small bowel including the right upper quadrant near the gastric outlet consistent with duodenal switch. Again noted, generalized mesenteric swirling in the left midabdomen near a small bowel   anastomosis with multiple dilated small bowel loops in the central abdomen. Findings are concerning for internal hernia. Multiple draining veins to these distended small bowel loops are severely narrowed. Moderate amount of stool seen in the colon to the   level of the rectum. No free fluid.    LYMPH NODES: Multiple mildly enlarged mesenteric lymph nodes are present with mild mesenteric fat stranding, similar to prior exam    VASCULATURE: No abdominal aortic aneurysm.    PELVIC ORGANS: No pelvic masses. Hysterectomy changes. Small amount of free fluid in the pelvic cul-de-sac.    MUSCULOSKELETAL: No concerning osseous abnormalities.      Impression    IMPRESSION:   1.  Again noted, generalized mesenteric swirling in the left midabdomen near a small bowel anastomosis with multiple dilated small bowel loops in the central abdomen. Findings are concerning for internal hernia    2.  Stable postcholecystectomy changes.    3.  Stable hepatic cysts. Stable right renal cysts.

## 2023-11-21 NOTE — MEDICATION SCRIBE - ADMISSION MEDICATION HISTORY
Medication Scribe Admission Medication History    Admission medication history is complete. The information provided in this note is only as accurate as the sources available at the time of the update.    Information Source(s): Patient via in-person    Pertinent Information: Spoke with patient in person and completed medication hx. Patient states that she is no longer taking Copper Gluconate 2MG Tab, Creon [id number] Units,Omeprazole 40MG DR Cap, Carafate 1GM/10ML Suspension, Vitamin D 12.5 MCG/0.25ML Liquid and Zinc 100MG Tab.    Changes made to PTA medication list:  Added: Zinc 50MG Tab  Deleted: Copper Gluconate 2MG Tab           Creon [id number] Units           Omeprazole 40MG DR Cap           Carafate 1GM/10ML Suspension           Vitamin D 12.5 MCG/0.25ML Liquid            Zinc 100MG Tab  Changed: Calcium Carbonate - Vit D : 1 Tab TID              Miralax - 17 g BID                   Senokot 8.6-50MG Tab- 1 Tab BID at bedtime                  Medication Affordability:  Not including over the counter (OTC) medications, was there a time in the past 3 months when you did not take your medications as prescribed because of cost?: Yes    Allergies reviewed with patient and updates made in EHR: no    Medication History Completed By: Rae Contreras 11/21/2023 9:26 AM    PTA Med List   Medication Sig Last Dose    acetaminophen (TYLENOL) 325 MG tablet Take 2 tablets (650 mg) by mouth every 4 hours as needed for other (For optimal non-opioid multimodal pain management to improve pain control and physical function.) Past Week    calcium carbonate (TUMS) 500 MG chewable tablet Take 1 chew tab by mouth 3 times daily as needed for heartburn Past Week    Calcium Carbonate-Vitamin D 600-5 MG-MCG TABS Take 1 tablet by mouth 2 times daily 11/20/2023 at 5:30PM    Ferrous Sulfate 324 MG TBEC Take 1 tablet by mouth in the AM and 2 tablets at bedtime. 11/20/2023 at 5:30PM    multivitamin w/minerals (MULTI-VITAMIN)  tablet Take 1 tablet by mouth daily 11/20/2023 at 5:30PM    polyethylene glycol (MIRALAX) 17 g packet Take 17 g by mouth 2 times daily 11/20/2023 at AM    senna-docusate (SENOKOT-S/PERICOLACE) 8.6-50 MG tablet Take 2 tablets by mouth at bedtime 11/20/2023 at 5:30PM    VITAMIN A PO Take 1 tablet by mouth daily 11/20/2023 at 5:30PM    vitamin C (ASCORBIC ACID) 250 MG tablet Take 1 tablet (250 mg) by mouth 3 times daily 11/20/2023 at 5:30PM    vitamin D2 (ERGOCALCIFEROL) 02177 units (1250 mcg) capsule Take 50,000 Units by mouth daily 11/20/2023 at 5:30PM    vitamin D3 (CHOLECALCIFEROL) 125 MCG (5000 UT) tablet Take 5,000 Units by mouth daily 11/20/2023 at 5:30PM    vitamin E (TOCOPHEROL) 400 units (180 mg) capsule Take 1 capsule (400 Units) by mouth daily 11/20/2023 at 5:30PM    zinc gluconate 50 MG tablet Take 50 mg by mouth daily 11/20/2023 at 5:30PM

## 2023-11-21 NOTE — ED PROVIDER NOTES
ED Provider Note  [facility]      History     Chief Complaint   Patient presents with    Post-op Problem     HPI  Juanis Sparks is a 43 year old female who presents for abdominal pain. She had recent abdominal surgery with ex lap, bowel resection and conversion of prior duodenal switch to RNY 10/23. Post op course was complicated by severe abdominal pain. She had recent possible internal hernia on CT and was admitted on 11/9 for more  severe pain. She states after discharge she was trying to manage at home but then over the last few days has had more severe pain. There is nausea but no vomiting. No fevers. She reports some constipation but is still having BM's.     Past Medical History  Past Medical History:   Diagnosis Date    Anemia     Gastroesophageal reflux disease with esophagitis     Hyperparathyroidism (H24)     Mild protein malnutrition (H24)     Osteoporosis     Postsurgical malabsorption      Past Surgical History:   Procedure Laterality Date    BYPASS GASTRIC DUODENAL SWITCH N/A 10/23/2023    Procedure: small bowel resection; redo enteroenterostomy  and closure of mesenteric defect;  Surgeon: Doyle Roth MD;  Location: [facility] OR    ESOPHAGOSCOPY, GASTROSCOPY, DUODENOSCOPY (EGD), COMBINED N/A 06/14/2023    Procedure: ESOPHAGOGASTRODUODENOSCOPY, WITH BIOPSY;  Surgeon: Doyle Roth MD;  Location: [facility] GI    GASTRECTOMY, SLEEVE, LAPAROSCOPIC, WITH DUODENAL SWITCH N/A 03/29/2016    Dr. Robin Ramos, Ethel BARILLAS; loop DS with 300cm efferent; 40Fr sleeve.    LAPAROTOMY, LYSIS ADHESIONS, COMBINED N/A 10/23/2023    Procedure: LAPAROTOMY, EXPLORATORY, WITH LYSIS OF ADHESIONS;  Surgeon: oDyle Roth MD;  Location: [facility] OR    AL LAPAROSCOPIC ENTEROENTEROSTOMY, ANASTOMOSIS OF INTESTINE N/A 06/21/2016    bile reflux and stricture of DI; converted to 50cm alimentary (Mary Ann) and 250 common channel     acetaminophen (TYLENOL) 325 MG tablet  calcium carbonate (TUMS) 500 MG  chewable tablet  Calcium Carbonate-Vitamin D 600-5 MG-MCG TABS  copper gluconate 2 MG tablet  cyanocobalamin (CYANOCOBALAMIN) 1000 MCG/ML injection  Ferrous Sulfate 324 MG TBEC  lipase-protease-amylase (CREON) 69120-44165-072762 units CPEP per EC capsule  multivitamin w/minerals (MULTI-VITAMIN) tablet  omeprazole (PRILOSEC) 40 MG DR capsule  oxyCODONE (ROXICODONE) 5 MG tablet  polyethylene glycol (MIRALAX) 17 GM/Dose powder  senna-docusate (SENOKOT-S/PERICOLACE) 8.6-50 MG tablet  sucralfate (CARAFATE) 1 GM/10ML suspension  VITAMIN A PO  vitamin C (ASCORBIC ACID) 250 MG tablet  Vitamin D 12.5 MCG/0.25ML LIQD  vitamin D2 (ERGOCALCIFEROL) 25824 units (1250 mcg) capsule  vitamin D3 (CHOLECALCIFEROL) 125 MCG (5000 UT) tablet  vitamin E (TOCOPHEROL) 400 units (180 mg) capsule  Zinc 100 MG TABS      Allergies   Allergen Reactions    Amoxicillin Hives and Itching    Tetracycline Hives    Cyanoacrylate Rash     Family History  No family history on file.  Social History   Social History     Tobacco Use    Smoking status: Never     Passive exposure: Never    Smokeless tobacco: Never   Substance Use Topics    Alcohol use: Not Currently    Drug use: Not Currently         A medically appropriate review of systems was performed with pertinent positives and negatives noted in the HPI, and all other systems negative.    Physical Exam   BP: 127/77  Pulse: 75  Temp: 98.1  F (36.7  C)  Resp: 15  SpO2: 92 %  Physical Exam  Constitutional:       General: She is not in acute distress.     Appearance: Normal appearance. She is not toxic-appearing.   HENT:      Head: Normocephalic and atraumatic.      Nose: Nose normal. No congestion.      Mouth/Throat:      Mouth: Mucous membranes are moist.      Pharynx: Oropharynx is clear.   Eyes:      Extraocular Movements: Extraocular movements intact.      Pupils: Pupils are equal, round, and reactive to light.   Cardiovascular:      Rate and Rhythm: Normal rate and regular rhythm.   Pulmonary:       Effort: Pulmonary effort is normal. No respiratory distress.      Breath sounds: No wheezing.   Abdominal:      General: There is no distension.      Palpations: Abdomen is soft.      Tenderness: There is abdominal tenderness. There is right CVA tenderness. There is no left CVA tenderness, guarding or rebound.   Musculoskeletal:         General: Normal range of motion.      Cervical back: Normal range of motion.   Skin:     General: Skin is warm and dry.   Neurological:      General: No focal deficit present.      Mental Status: She is alert and oriented to person, place, and time.           ED Course, Procedures, & Data      Procedures                      Results for orders placed or performed during the hospital encounter of 11/20/23   CT Abdomen Pelvis w Contrast     Status: None    Narrative    EXAM: CT ABDOMEN PELVIS W CONTRAST  LOCATION: [facility]  DATE: 11/20/2023    INDICATION: recent surgery, increased abdominal pain and tenderness  COMPARISON: 11/09/2023  TECHNIQUE: CT scan of the abdomen and pelvis was performed following injection of IV contrast. Multiplanar reformats were obtained. Dose reduction techniques were used.  CONTRAST: iopamidol (ISOVUE 370) solution 92 mL    FINDINGS:   LOWER CHEST: Normal.    HEPATOBILIARY: Normal liver with unchanged 1.6 cm cyst in the hepatic dome. Gallbladder is surgically absent. Stable mild intrahepatic and extrahepatic biliary prominence.    PANCREAS: Normal.    SPLEEN: Normal.    ADRENAL GLANDS: Normal.    KIDNEYS/BLADDER: 1.8 cm stable simple right renal cyst does not require follow-up. No significant mass, stone, or hydronephrosis.    BOWEL: Again noted surgical clips in bilateral abdominal small bowel including the right upper quadrant near the gastric outlet consistent with duodenal switch. Again noted, generalized mesenteric swirling in the left midabdomen near a small bowel   anastomosis with multiple dilated small  bowel loops in the central abdomen. Findings are concerning for internal hernia. Multiple draining veins to these distended small bowel loops are severely narrowed. Moderate amount of stool seen in the colon to the   level of the rectum. No free fluid.    LYMPH NODES: Multiple mildly enlarged mesenteric lymph nodes are present with mild mesenteric fat stranding, similar to prior exam    VASCULATURE: No abdominal aortic aneurysm.    PELVIC ORGANS: No pelvic masses. Hysterectomy changes. Small amount of free fluid in the pelvic cul-de-sac.    MUSCULOSKELETAL: No concerning osseous abnormalities.      Impression    IMPRESSION:   1.  Again noted, generalized mesenteric swirling in the left midabdomen near a small bowel anastomosis with multiple dilated small bowel loops in the central abdomen. Findings are concerning for internal hernia    2.  Stable postcholecystectomy changes.    3.  Stable hepatic cysts. Stable right renal cysts.   Comprehensive metabolic panel     Status: Abnormal   Result Value Ref Range    Sodium 141 135 - 145 mmol/L    Potassium 3.9 3.4 - 5.3 mmol/L    Carbon Dioxide (CO2) 25 22 - 29 mmol/L    Anion Gap 13 7 - 15 mmol/L    Urea Nitrogen 20.8 (H) 6.0 - 20.0 mg/dL    Creatinine 0.71 0.51 - 0.95 mg/dL    GFR Estimate >90 >60 mL/min/1.73m2    Calcium 9.5 8.6 - 10.0 mg/dL    Chloride 103 98 - 107 mmol/L    Glucose 125 (H) 70 - 99 mg/dL    Alkaline Phosphatase 99 40 - 150 U/L    AST 22 0 - 45 U/L    ALT 17 0 - 50 U/L    Protein Total 7.1 6.4 - 8.3 g/dL    Albumin 4.3 3.5 - 5.2 g/dL    Bilirubin Total 0.5 <=1.2 mg/dL   Lipase     Status: Abnormal   Result Value Ref Range    Lipase 68 (H) 13 - 60 U/L   UA with Microscopic reflex to Culture     Status: Abnormal    Specimen: Urine, Midstream   Result Value Ref Range    Color Urine Light Yellow Colorless, Straw, Light Yellow, Yellow    Appearance Urine Clear Clear    Glucose Urine Negative Negative mg/dL    Bilirubin Urine Negative Negative    Ketones Urine  80 (A) Negative mg/dL    Specific Gravity Urine 1.015 1.003 - 1.035    Blood Urine Negative Negative    pH Urine 7.0 5.0 - 7.0    Protein Albumin Urine 20 (A) Negative mg/dL    Urobilinogen Urine Normal Normal, 2.0 mg/dL    Nitrite Urine Negative Negative    Leukocyte Esterase Urine Negative Negative    Mucus Urine Present (A) None Seen /LPF    Amorphous Crystals Urine Few (A) None Seen /HPF    RBC Urine 5 (H) <=2 /HPF    WBC Urine 2 <=5 /HPF    Squamous Epithelials Urine 5 (H) <=1 /HPF    Narrative    Urine Culture not indicated   HCG qualitative pregnancy (blood)     Status: Normal   Result Value Ref Range    hCG Serum Qualitative Negative Negative   CBC with platelets and differential     Status: None   Result Value Ref Range    WBC Count 6.7 4.0 - 11.0 10e3/uL    RBC Count 4.09 3.80 - 5.20 10e6/uL    Hemoglobin 13.2 11.7 - 15.7 g/dL    Hematocrit 38.8 35.0 - 47.0 %    MCV 95 78 - 100 fL    MCH 32.3 26.5 - 33.0 pg    MCHC 34.0 31.5 - 36.5 g/dL    RDW 11.6 10.0 - 15.0 %    Platelet Count 279 150 - 450 10e3/uL    % Neutrophils 59 %    % Lymphocytes 27 %    % Monocytes 11 %    % Eosinophils 1 %    % Basophils 1 %    % Immature Granulocytes 1 %    NRBCs per 100 WBC 0 <1 /100    Absolute Neutrophils 4.0 1.6 - 8.3 10e3/uL    Absolute Lymphocytes 1.8 0.8 - 5.3 10e3/uL    Absolute Monocytes 0.7 0.0 - 1.3 10e3/uL    Absolute Eosinophils 0.1 0.0 - 0.7 10e3/uL    Absolute Basophils 0.0 0.0 - 0.2 10e3/uL    Absolute Immature Granulocytes 0.0 <=0.4 10e3/uL    Absolute NRBCs 0.0 10e3/uL   Lactic acid whole blood     Status: Normal   Result Value Ref Range    Lactic Acid 0.9 0.7 - 2.0 mmol/L   CBC with platelets differential     Status: None    Narrative    The following orders were created for panel order CBC with platelets differential.  Procedure                               Abnormality         Status                     ---------                               -----------         ------                     CBC with  platelets and d...[[id number]]                      Final result                 Please view results for these tests on the individual orders.     Medications   sodium chloride 0.9% BOLUS 1,000 mL (0 mLs Intravenous Stopped 11/21/23 0051)   ondansetron (ZOFRAN) injection 4 mg (4 mg Intravenous $Given 11/20/23 2207)   HYDROmorphone (PF) (DILAUDID) injection 0.5 mg (0.5 mg Intravenous $Given 11/20/23 2223)   iopamidol (ISOVUE-370) solution 92 mL (92 mLs Intravenous $Given 11/20/23 2259)   sodium chloride 0.9 % bag 500mL for CT scan flush use (73 mLs Intravenous $Given 11/20/23 2259)   HYDROmorphone (PF) (DILAUDID) injection 0.5 mg (0.5 mg Intravenous $Given 11/21/23 0022)   metoclopramide (REGLAN) injection 5 mg (5 mg Intravenous $Given 11/21/23 0037)   HYDROmorphone (DILAUDID) injection 1 mg (0.5 mg Intravenous $Given 11/21/23 0058)   morphine (PF) injection 4 mg (4 mg Intravenous $Given 11/21/23 0229)     Labs Ordered and Resulted from Time of ED Arrival to Time of ED Departure   COMPREHENSIVE METABOLIC PANEL - Abnormal       Result Value    Sodium 141      Potassium 3.9      Carbon Dioxide (CO2) 25      Anion Gap 13      Urea Nitrogen 20.8 (*)     Creatinine 0.71      GFR Estimate >90      Calcium 9.5      Chloride 103      Glucose 125 (*)     Alkaline Phosphatase 99      AST 22      ALT 17      Protein Total 7.1      Albumin 4.3      Bilirubin Total 0.5     LIPASE - Abnormal    Lipase 68 (*)    ROUTINE UA WITH MICROSCOPIC REFLEX TO CULTURE - Abnormal    Color Urine Light Yellow      Appearance Urine Clear      Glucose Urine Negative      Bilirubin Urine Negative      Ketones Urine 80 (*)     Specific Gravity Urine 1.015      Blood Urine Negative      pH Urine 7.0      Protein Albumin Urine 20 (*)     Urobilinogen Urine Normal      Nitrite Urine Negative      Leukocyte Esterase Urine Negative      Mucus Urine Present (*)     Amorphous Crystals Urine Few (*)     RBC Urine 5 (*)     WBC Urine 2      Squamous  Epithelials Urine 5 (*)    HCG QUALITATIVE PREGNANCY - Normal    hCG Serum Qualitative Negative     LACTIC ACID WHOLE BLOOD - Normal    Lactic Acid 0.9     CBC WITH PLATELETS AND DIFFERENTIAL    WBC Count 6.7      RBC Count 4.09      Hemoglobin 13.2      Hematocrit 38.8      MCV 95      MCH 32.3      MCHC 34.0      RDW 11.6      Platelet Count 279      % Neutrophils 59      % Lymphocytes 27      % Monocytes 11      % Eosinophils 1      % Basophils 1      % Immature Granulocytes 1      NRBCs per 100 WBC 0      Absolute Neutrophils 4.0      Absolute Lymphocytes 1.8      Absolute Monocytes 0.7      Absolute Eosinophils 0.1      Absolute Basophils 0.0      Absolute Immature Granulocytes 0.0      Absolute NRBCs 0.0       CT Abdomen Pelvis w Contrast   Final Result   IMPRESSION:    1.  Again noted, generalized mesenteric swirling in the left midabdomen near a small bowel anastomosis with multiple dilated small bowel loops in the central abdomen. Findings are concerning for internal hernia      2.  Stable postcholecystectomy changes.      3.  Stable hepatic cysts. Stable right renal cysts.             Critical care was not performed.     Medical Decision Making  The patient's presentation was of high complexity (a chronic illness severe exacerbation, progression, or side effect of treatment).    The patient's evaluation involved:  review of external note(s) from 3+ sources (surgery notes, discharge summary, telehealth note)  review of 3+ test result(s) ordered prior to this encounter (prior CT, CBC, CMP)  ordering and/or review of 3+ test(s) in this encounter (see separate area of note for details)  discussion of management or test interpretation with another health professional (general surgery)    The patient's management necessitated high risk (a decision regarding hospitalization).    Assessment & Plan    43-year-old female with history of recent abdominal surgery with bariatric service complicated by significant  postoperative pain and readmission for pain not to be related to possible internal hernia.  With increased abdominal pain.  Patient was afebrile, hemodynamically stable here.  Exam did show some abdominal tenderness. CBC unremarkable. CMP similar to prior. Lipase 88. CT re-demonstrated possible internal hernia. Surgery was consulted and recommended she be admitted to their service. Patient was agreeable with plan for admission.     I have reviewed the nursing notes. I have reviewed the findings, diagnosis, plan and need for follow up with the patient.    New Prescriptions    No medications on file       Final diagnoses:   Generalized abdominal pain       Matt Cook  [facility] EMERGENCY DEPARTMENT  11/20/2023     Matt Cook MD  11/21/23 023

## 2023-11-22 PROCEDURE — C9113 INJ PANTOPRAZOLE SODIUM, VIA: HCPCS | Mod: JZ

## 2023-11-22 PROCEDURE — 96361 HYDRATE IV INFUSION ADD-ON: CPT

## 2023-11-22 PROCEDURE — 250N000013 HC RX MED GY IP 250 OP 250 PS 637

## 2023-11-22 PROCEDURE — 250N000011 HC RX IP 250 OP 636: Mod: JZ

## 2023-11-22 PROCEDURE — 96376 TX/PRO/DX INJ SAME DRUG ADON: CPT

## 2023-11-22 PROCEDURE — 250N000013 HC RX MED GY IP 250 OP 250 PS 637: Performed by: SURGERY

## 2023-11-22 PROCEDURE — G0378 HOSPITAL OBSERVATION PER HR: HCPCS

## 2023-11-22 RX ORDER — NALOXONE HYDROCHLORIDE 0.4 MG/ML
0.4 INJECTION, SOLUTION INTRAMUSCULAR; INTRAVENOUS; SUBCUTANEOUS
Status: DISCONTINUED | OUTPATIENT
Start: 2023-11-22 | End: 2023-11-23 | Stop reason: HOSPADM

## 2023-11-22 RX ORDER — NALOXONE HYDROCHLORIDE 0.4 MG/ML
0.2 INJECTION, SOLUTION INTRAMUSCULAR; INTRAVENOUS; SUBCUTANEOUS
Status: DISCONTINUED | OUTPATIENT
Start: 2023-11-22 | End: 2023-11-23 | Stop reason: HOSPADM

## 2023-11-22 RX ADMIN — DIATRIZOATE MEGLUMINE AND DIATRIZOATE SODIUM 740 ML: 660; 100 SOLUTION ORAL; RECTAL at 09:48

## 2023-11-22 RX ADMIN — PANTOPRAZOLE SODIUM 40 MG: 40 INJECTION, POWDER, FOR SOLUTION INTRAVENOUS at 08:03

## 2023-11-22 RX ADMIN — POLYETHYLENE GLYCOL 3350 34 G: 17 POWDER, FOR SOLUTION ORAL at 08:03

## 2023-11-22 RX ADMIN — POLYETHYLENE GLYCOL 3350 34 G: 17 POWDER, FOR SOLUTION ORAL at 20:36

## 2023-11-22 RX ADMIN — SIMETHICONE 226 ML: 125 CAPSULE, LIQUID FILLED ORAL at 19:40

## 2023-11-22 ASSESSMENT — ACTIVITIES OF DAILY LIVING (ADL)
ADLS_ACUITY_SCORE: 18

## 2023-11-22 NOTE — PROGRESS NOTES
Neuro: A&Ox4.   Cardiac: SR. VSS.   Respiratory: Sating Pt  on RA.  GI/: Adequate urine output.   Diet/appetite: Pt is on full liquid diet  Activity:  Pt is independent  Pain: At acceptable level on current regimen.   Skin: No new deficits noted.  LDA's: lactated ringers continuous infusion at 75 mL/hr  Blood pressure 93/53, pulse 58, temperature 98.7  F (37.1  C), resp. rate 18, SpO2 98%, not currently breastfeeding.   Plan: Continue with POC. Notify primary team with changes.

## 2023-11-22 NOTE — DISCHARGE SUMMARY
"[facility]  Surgery Discharge Summary      Date of Admission:  11/20/2023  Date of Discharge:  11/23/2023  Discharging Provider: MAURILIO GONZALES  Discharge Service: MIS    Discharge Diagnoses   Abdominal pain    Follow-ups Needed After Discharge   None    Unresulted Labs Ordered in the Past 30 Days of this Admission       No orders found from 10/21/2023 to 11/21/2023.            Discharge Disposition   Discharged to home  Condition at discharge: Stable    Hospital Course   Juanis Sparks is a 43F with PMHx significant for duodenal switch procedure in 2016 c/b malnutrition requiring revision on 10/23/2023 c/b ongoing post-operative abdominal pain with multiple recent ED visits who presented to the [facility] ED on 11/21/23  with 3 days of worsening abdominal pain, nausea, and vomiting. Had most recently been discharged on 11/10/23. Minimal stool output, not passing gas. Labs and vitals WNL on admission, CT-AP showing similar mesenteric swirling that was seen on prior scans. Tender to palpation on exam but not peritonitic.      Patient had long discussion with team yesterday, including conversations with Dr. Bowling and Dr. Roth. Patient expressed lots of frustration regarding her ongoing pain and her perceived lack of care. Had tried to seek a second opinion at [facility], however had been asked to return to [facility] due to being within 30d of her prior surgery. Stated that she \"cannot live like this\". Did discuss with her that her complex abdominal surgical history poses lots of risks and that the current stable \"mesenteric swirling\" seen on multiple repeat images is thought to be inherent anatomy from her re-do operation and not thought to be evidence of an acute internal hernia per the operating surgeon. Also discussed with her that her pain may be chronic in nature and require many months-years of ongoing evaluation and treatment. Patient and  were understanding but " continued to express their frustration which was heard by the team. A gastrograffin enema was attempted on 11/22, but was unable to be completed likely due to stool in the rectal vault. A pink lady enema was attempted in conjunction with BID Miralax. The patient tolerated full liquid diet without nausea. On 11/23 the patient had a bowel movement with moderate relief of symptoms.     She noted that she had a close COVID-19 exposure and COVID-19 test was completed which was unfortunately positive. Patient was not found to be a candidate for paxlovid. She also had no significant cardiorespiratory symptoms, only some prevailing migraines and fatigue. Unfortunately, hospital policy dictating 10-days of in-hospital isolation. However, for this patient with no acute in-patient hospitalization needs, it was deemed that this would be an unnecessarily long hospital stay with little therapeutic benefit in joint discussion with the patient. Therefore, decision was made for patient to discharge home, where she would be able to isolate and work from home as necessary. Patient was comfortable with this plan and was discharged to home in stable condition on 11/23.      Consultations This Hospital Stay   None    Code Status   Full Code      [facility] [address] [facility]  [address] [address]  Phone: [phone]  Fax: [phone]  ______________________________________________________________________    Physical Exam   Vital Signs: Temp: 98.3  F (36.8  C) Temp src: Oral BP: 99/69 Pulse: 68   Resp: 16 SpO2: 98 % O2 Device: None (Room air)    Weight: 0 lbs 0 oz    AAOx3, NAD  NLB on RA  Abd soft, mildly tender, non-distended  Moving extremities independently       Primary Care Physician   Charito Rios    Discharge Orders   No discharge procedures on file.    Significant Results and Procedures   COVID-19+    Discharge Medications   Current Discharge Medication List         CONTINUE these medications which have NOT CHANGED    Details   acetaminophen (TYLENOL) 325 MG tablet Take 2 tablets (650 mg) by mouth every 4 hours as needed for other (For optimal non-opioid multimodal pain management to improve pain control and physical function.)  Qty: 40 tablet, Refills: 0    Associated Diagnoses: S/P gastric bypass; S/P bariatric surgery      calcium carbonate (TUMS) 500 MG chewable tablet Take 1 chew tab by mouth 3 times daily as needed for heartburn      Calcium Carbonate-Vitamin D 600-5 MG-MCG TABS Take 1 tablet by mouth 3 times daily      Ferrous Sulfate 324 MG TBEC Take 1 tablet by mouth in the AM and 2 tablets at bedtime.      multivitamin w/minerals (MULTI-VITAMIN) tablet Take 1 tablet by mouth daily      polyethylene glycol (MIRALAX) 17 g packet Take 17 g by mouth 2 times daily      senna-docusate (SENOKOT-S/PERICOLACE) 8.6-50 MG tablet Take 2 tablets by mouth at bedtime      VITAMIN A PO Take 1 tablet by mouth daily      vitamin C (ASCORBIC ACID) 250 MG tablet Take 1 tablet (250 mg) by mouth 3 times daily  Qty: 90 tablet, Refills: 4    Associated Diagnoses: S/P bariatric surgery; Intestinal malabsorption, unspecified type; Vitamin deficiency; Hyperparathyroidism, unspecified (H24)      vitamin D2 (ERGOCALCIFEROL) 32625 units (1250 mcg) capsule Take 50,000 Units by mouth daily      vitamin D3 (CHOLECALCIFEROL) 125 MCG (5000 UT) tablet Take 5,000 Units by mouth daily      vitamin E (TOCOPHEROL) 400 units (180 mg) capsule Take 1 capsule (400 Units) by mouth daily  Qty: 90 capsule, Refills: 3    Associated Diagnoses: S/P bariatric surgery; Hx of gastric ulcer      zinc gluconate 50 MG tablet Take 50 mg by mouth daily      cyanocobalamin (CYANOCOBALAMIN) 1000 MCG/ML injection Inject 1,000 mcg into the muscle every 14 days Next dose 10/15/23      oxyCODONE (ROXICODONE) 5 MG tablet Take 1-2 tablets (5-10 mg) by mouth every 6 hours as needed for moderate pain  Qty: 20 tablet, Refills: 0     Associated Diagnoses: S/P gastric bypass; S/P bariatric surgery           Allergies   Allergies   Allergen Reactions    Amoxicillin Hives and Itching    Tetracycline Hives    Cyanoacrylate Rash

## 2023-11-22 NOTE — CARE PLAN
AVSS on RA.  A/OX4.  Pt. Is having some abdominal pain but declines medication because she believes it will make constipation worse.  Denies nausea.  Tolerating full liquid diet.  Voiding spontaneously without difficulty.  No BM.  Not passing gas.  R PIV infusing LR @ 50 ml/hr.   Up independently.   Continue plan of care.

## 2023-11-22 NOTE — PLAN OF CARE
Goal Outcome Evaluation:      Plan of Care Reviewed With: patient, spouse    Overall Patient Progress: no changeOverall Patient Progress: no change       Pt took oxycodone x1 today for abdominal pain.  Pt remained NPO until 1800, full liquid diet entered.  Pt frustrated and would like a regular diet.  MD paged.  Pt remains on IV fluids.  Pt up independently.   at bedside.  Will continue with care plan and notify MD if any concerns.

## 2023-11-22 NOTE — ED NOTES
Hi. ED-6.  Received a call fr imaging Dept with regard to XR Colon H2O Soluble, telling me if it should be done today? Informing me too that On-call radiologist must be page at [phone] by the ordering Provider for approval by the On-call. Thank u   --Above message sent to admitting Team: Surgery General Consult Resident.

## 2023-11-22 NOTE — PROGRESS NOTES
I spoke with Juanis and with her  this evening at approximately 5:30 PM and we reviewed her clinical course and current situation.    She has some epigastric abdominal pain with eating food and has been able to tolerate only a small amount.    There are no concerning laboratory findings.    She has a CT scan with a swirl and this is not concordant with her clinical findings otherwise.    There is also stool on CT a nd increased intraintestinal gas.    The challenges in managing this situation boil down to a history of complex abdominal surgery (duodenal switch) with unclear pre-laparotomy cause for abdominal pain; there was a surgically-induced internal hernia which occurred at the time of her first revisional duodenal switch in 2015, which I repaired along with common channel lengthening to fix her malabsorption problem and associated diarrhe a.    There are no great treatments for nonoperative abdominal pain; narcotics, cause dysmotility of bowel and colon while ibuprofen would rip up the lining of the stomach.    Certainly increased MiraLAX dosing (2 doses twice daily) and potentially a gastrograffin enema tomorrow are indicated.

## 2023-11-22 NOTE — ED NOTES
Hi. Ed-6.  Pt is asking if her diet can be change to advance diet. Thank you.    Kit ED-RN  [id number]    -Above message sent to admitting team.    -Received a call back from admitting team around 2035H, and informed no changes on diet order and for the X-ray Colon water Soluble, they will be in contact with on-call radiologist. Patient updated.

## 2023-11-22 NOTE — PROGRESS NOTES
"Resident/Fellow Attestation   I, Giulia Wilson DO, was present with the medical/SHALONDA student who participated in the service and in the documentation of the note.  I have verified the history and personally performed the physical exam and medical decision making.  I agree with the assessment and plan of care as documented in the note.      Abdominal pain seems to be stable at this time.   - Pending gastrograffin enema today   - Decreased IVF, cont FLD     Giulia Wilson DO  PGY1  Date of Service (when I saw the patient): 11/22/23    [facility]    Progress Note - EGS Service       Date of Admission:  11/20/2023    Assessment & Plan: Surgery   Juanis Sparks is a 43F with PMHx significant for duodenal switch procedure in 2016 c/b malnutrition requiring revision on 10/23/2023 c/b ongoing post-operative abdominal pain with multiple recent ED visits who presented to the [facility] ED on 11/21/23  with 3 days of worsening abdominal pain, nausea, and vomiting. Had most recently been discharged on 11/10/23. Minimal stool output, not passing gas. Labs and vitals WNL on admission, CT-AP showing similar mesenteric swirling that was seen on prior scans. Tender to palpation on exam but not peritonitic.     Patient had long discussion with team yesterday, including conversations with Dr. Bowling and Dr. Roth. Patient expressed lots of frustration regarding her ongoing pain and her perceived lack of care. Had tried to seek a second opinion at [facility], however had been asked to return to [facility] due to being within 30d of her prior surgery. Stated that she \"cannot live like this\". Did discuss with her that her complex abdominal surgical history poses lots of risks and that the current stable \"mesenteric swirling\" seen on multiple repeat images is thought to be inherent anatomy from her re-do operation and not thought to be evidence of an acute internal hernia per the operating surgeon. " Also discussed with her that her pain may be chronic in nature and require many months-years of ongoing evaluation and treatment. Patient and  were understanding but continued to express their frustration which was heard by the team.     - No surgical intervention necessary at this time   - Gastrograffin enema today   - Continue Miralax   - Full liquid diet as tolerated      Diet: Full Liquid Diet    DVT Prophylaxis: Low Risk/Ambulatory with no VTE prophylaxis indicated  Anthony Catheter: Not present  Lines: None     Drains: None     Cardiac Monitoring: None  Code Status: Full Code      Clinically Significant Risk Factors Present on Admission                                  Disposition Plan      Expected Discharge Date: 11/22/2023                 The patient's care was discussed with the Chief Resident/Fellow.    MAURILIO GONZALES, MS3  [facility]  Text page via [facility] Paging/Directory     ______________________________________________________________________    Interval History   Ongoing abdominal pain. Has not worsened since presentation. Continues to not have a bowel movement.     Physical Exam   Vital Signs: Temp: 98.3  F (36.8  C) Temp src: Oral BP: 99/69 Pulse: 68   Resp: 16 SpO2: 98 % O2 Device: None (Room air)    Weight: 0 lbs 0 oz  Intake/Output Summary (Last 24 hours) at 11/22/2023 0844  Last data filed at 11/22/2023 0055  Gross per 24 hour   Intake 1118.75 ml   Output --   Net 1118.75 ml     AAOx3, NAD  NLB on RA  Abdomen taut, moderate guarding, tenderness to palpation    Data     I have personally reviewed the following data over the past 24 hrs:    6.4  \   11.8   / 242     139 106 15.5 /  90   4.0 24 0.53 \     ALT: 16 AST: 21 AP: 80 TBILI: 0.4   ALB: 3.8 TOT PROTEIN: 6.1 (L) LIPASE: N/A

## 2023-11-23 VITALS
DIASTOLIC BLOOD PRESSURE: 73 MMHG | HEART RATE: 91 BPM | OXYGEN SATURATION: 97 % | TEMPERATURE: 99 F | RESPIRATION RATE: 16 BRPM | SYSTOLIC BLOOD PRESSURE: 109 MMHG

## 2023-11-23 LAB — SARS-COV-2 RNA RESP QL NAA+PROBE: POSITIVE

## 2023-11-23 PROCEDURE — C9113 INJ PANTOPRAZOLE SODIUM, VIA: HCPCS | Mod: JZ

## 2023-11-23 PROCEDURE — 250N000013 HC RX MED GY IP 250 OP 250 PS 637

## 2023-11-23 PROCEDURE — 96361 HYDRATE IV INFUSION ADD-ON: CPT

## 2023-11-23 PROCEDURE — 250N000011 HC RX IP 250 OP 636: Mod: JZ

## 2023-11-23 PROCEDURE — 96376 TX/PRO/DX INJ SAME DRUG ADON: CPT

## 2023-11-23 PROCEDURE — G0378 HOSPITAL OBSERVATION PER HR: HCPCS

## 2023-11-23 PROCEDURE — 250N000013 HC RX MED GY IP 250 OP 250 PS 637: Performed by: SURGERY

## 2023-11-23 PROCEDURE — 87635 SARS-COV-2 COVID-19 AMP PRB: CPT

## 2023-11-23 PROCEDURE — 258N000003 HC RX IP 258 OP 636

## 2023-11-23 RX ORDER — ACETAMINOPHEN 325 MG/1
650 TABLET ORAL EVERY 8 HOURS PRN
Status: DISCONTINUED | OUTPATIENT
Start: 2023-11-23 | End: 2023-11-23 | Stop reason: HOSPADM

## 2023-11-23 RX ORDER — AMOXICILLIN 250 MG
2 CAPSULE ORAL AT BEDTIME
Status: DISCONTINUED | OUTPATIENT
Start: 2023-11-23 | End: 2023-11-23 | Stop reason: HOSPADM

## 2023-11-23 RX ORDER — ACETAMINOPHEN 325 MG/1
650 TABLET ORAL EVERY 4 HOURS PRN
Status: DISCONTINUED | OUTPATIENT
Start: 2023-11-23 | End: 2023-11-23

## 2023-11-23 RX ORDER — MINERAL OIL 100 G/100G
1 OIL RECTAL DAILY PRN
Qty: 133 ML | Refills: 0 | Status: SHIPPED | OUTPATIENT
Start: 2023-11-23 | End: 2023-12-03

## 2023-11-23 RX ADMIN — SIMETHICONE 226 ML: 125 CAPSULE, LIQUID FILLED ORAL at 09:13

## 2023-11-23 RX ADMIN — ACETAMINOPHEN 650 MG: 325 TABLET, FILM COATED ORAL at 12:13

## 2023-11-23 RX ADMIN — POLYETHYLENE GLYCOL 3350 34 G: 17 POWDER, FOR SOLUTION ORAL at 09:13

## 2023-11-23 RX ADMIN — OXYCODONE HYDROCHLORIDE 5 MG: 5 TABLET ORAL at 05:34

## 2023-11-23 RX ADMIN — PANTOPRAZOLE SODIUM 40 MG: 40 INJECTION, POWDER, FOR SOLUTION INTRAVENOUS at 09:13

## 2023-11-23 RX ADMIN — SODIUM CHLORIDE, POTASSIUM CHLORIDE, SODIUM LACTATE AND CALCIUM CHLORIDE: 600; 310; 30; 20 INJECTION, SOLUTION INTRAVENOUS at 05:32

## 2023-11-23 RX ADMIN — ONDANSETRON 4 MG: 2 INJECTION INTRAMUSCULAR; INTRAVENOUS at 18:52

## 2023-11-23 ASSESSMENT — ACTIVITIES OF DAILY LIVING (ADL)
ADLS_ACUITY_SCORE: 18

## 2023-11-23 NOTE — PROGRESS NOTES
"Resident/Fellow Attestation   I, Giulia Wilson DO , was present with the medical/SHALONDA student who participated in the service and in the documentation of the note.  I have verified the history and personally performed the physical exam and medical decision making.  I agree with the assessment and plan of care as documented in the note.      Patient overall hemodynamically stable. Had bowel movement. Abdominal pain controlled and for the most part unchanged compared to prior days. Tolerating diet. Would like COVID test prior to discharge.     - COVID-19 test pending   - Cont enema, senna, miralax   - Full liquid diet as tolerated     Giulia Wilson DO   PGY1  Date of Service (when I saw the patient): 11/23/23    [facility]    Progress Note - EGS Service       Date of Admission:  11/20/2023    Assessment & Plan: Surgery   Juanis Sparks is a 43F with PMHx significant for duodenal switch procedure in 2016 c/b malnutrition requiring revision on 10/23/2023 c/b ongoing post-operative abdominal pain with multiple recent ED visits who presented to the [facility] ED on 11/21/23  with 3 days of worsening abdominal pain, nausea, and vomiting. Had most recently been discharged on 11/10/23. Minimal stool output, not passing gas. Labs and vitals WNL on admission, CT-AP showing similar mesenteric swirling that was seen on prior scans. Tender to palpation on exam but not peritonitic.     Patient had long discussion with team yesterday, including conversations with Dr. Bowling and Dr. Roth. Patient expressed lots of frustration regarding her ongoing pain and her perceived lack of care. Had tried to seek a second opinion at [facility], however had been asked to return to [facility] due to being within 30d of her prior surgery. Stated that she \"cannot live like this\". Did discuss with her that her complex abdominal surgical history poses lots of risks and that the current stable \"mesenteric " "swirling\" seen on multiple repeat images is thought to be inherent anatomy from her re-do operation and not thought to be evidence of an acute internal hernia per the operating surgeon. Also discussed with her that her pain may be chronic in nature and require many months-years of ongoing evaluation and treatment. Patient and  were understanding but continued to express their frustration which was heard by the team. Unable to complete Gastrogaffin enema yesterday due to difficulty with insertion, likely due to stool in rectal vault. She is tolerating her diet without nausea or vomiting. She had one bowel movement with some relief of symptoms. Patient also noted that her daughter recently tested positive for COVID.     - COVID-19 test pending   - No surgical intervention necessary at this time   - Docusate enema  - Restart Senna   - Continue Miralax   - Full liquid diet as tolerated        Diet: Full Liquid Diet    DVT Prophylaxis: Low Risk/Ambulatory with no VTE prophylaxis indicated  Anthony Catheter: Not present  Lines: None     Drains: None     Cardiac Monitoring: None  Code Status: Full Code      Clinically Significant Risk Factors Present on Admission                                  Disposition Plan      Expected Discharge Date: 11/23/2023                 The patient's care was discussed with the Chief Resident/Fellow.    MAURILIO GONZALES, MS3  [facility]  Non-urgent messages: Securely message with [employer] (more info)  Text page via [facility] Paging/Directory     ______________________________________________________________________    Interval History   Ongoing abdominal pain, worse in RUQ. Has not worsened since presentation. She is tolerating her diet without nausea or vomiting.        Physical Exam   Vital Signs: Temp: 99.7  F (37.6  C) Temp src: Oral BP: 116/85 Pulse: 89   Resp: 16 SpO2: 96 % O2 Device: None (Room air)    Weight: 0 lbs 0 ozNo intake or output " data in the 24 hours ending 11/23/23 0920    AAOx3, NAD  NLB on RA  Abdomen soft, non distended TTP in right abdomen, no guarding, non peritonitic        Data

## 2023-11-23 NOTE — PROGRESS NOTES
BP (P) 116/85 (BP Location: Left arm, Patient Position: Semi-Gasca's, Cuff Size: Adult Regular)   Pulse (P) 89   Temp (P) 98.9  F (37.2  C) (Oral)   Resp (P) 16   SpO2 (P) 96%        PT AOx4, VSS. On RA, denies chest pain. C/o headaches, denies abd pain. On FLD. Voiding adequately, no BM. LBM 11/22. LR infusing @ 50 ml/hr. Up ad myrna. Possible discharge today, continue POC

## 2023-11-23 NOTE — PROGRESS NOTES
[employer] paged general surg-- SARAVANAN CATALAN [ Msg Id [id number] ]      OBS-7. H. L.  Hi,  Pt tested positive for covid-19. Are we holding on discharge or not? Just wanted to let the patient know. Thanks.   DAVID Hansen [phone]    Response: order placed

## 2023-11-23 NOTE — PROGRESS NOTES
[employer] paged general surg-- SARAVANAN CATALAN [ Msg Id [id number] ]  OBS 7. H. L.     Hi,  Pt c/o headaches. Can you order Tylenol? Thanks  DAVID Hansen [phone]

## 2023-11-24 NOTE — PROGRESS NOTES
/73 (BP Location: Left arm)   Pulse 91   Temp 99  F (37.2  C) (Oral)   Resp 16   SpO2 97%   Patient's condition and vital Signs are stable/WNL.  Discharge instructions reviewed with patient and questions answered. Patient verbalizes understanding. IV removed. Patient has all belongings. Pt ambulated to lobby with  and staff.

## 2023-11-24 NOTE — PLAN OF CARE
/73 (BP Location: Left arm)   Pulse 91   Temp 99  F (37.2  C) (Oral)   Resp 16   SpO2 97%     Care from: 0700 - 1930    VS & Pain: soft BP (within range). On RA. C/o headache, given tylenol x1-- effective   Neuro: AxO x4  Respiratory: denies shortness of breath   Cardiac: no acute distress noted   Peripheral neurovascular: wdl   GI/: + bowel sounds. Received scheduled bowel regime-- effective. Had 1 extra large BM this AM. Voids spontaneously. Commode at bedside   Nutrition: liquid, tolerating. C/o nausea-- zofran given x1-- effective    Skin: no new concerns noted   Musculoskeletal: wdl   Lines: R. PIV- SL  Activity: independent     Plan: pending discharge today. Pt tested for Covid +. Providers okay'd to discharge home     Goal Outcome Evaluation:  Plan of Care Reviewed With: patient  Overall Patient Progress: declining

## 2023-11-25 NOTE — PROVIDER NOTIFICATION
Patient called to request return to work note after being discharged on Thurs 11/23.   Attempt to page Surg Resident.     Text paged Gen Surg Resident on-call:    OBS - Re: Pt L.H. Discharged 11/23/23 by Paula Patiño [phone]  called requesting Return to Work note.  Any chance you can help?  Thanks, Keyla HERNANDEZ [facility] [phone]    Response: Surg Resident returned call - will touch base with pt and complete note. Patient should be able to print from [employer]bridge, or stop by for printed copy.

## 2023-11-27 ENCOUNTER — TELEPHONE (OUTPATIENT)
Dept: SURGERY | Facility: CLINIC | Age: 43
End: 2023-11-27
Payer: COMMERCIAL

## 2023-11-28 NOTE — TELEPHONE ENCOUNTER
Spoke with patient and she prefers to use the over the counter saline enema so will not be continuing the mineral oil enema. Last BM yesterday. Plans to do enema today.

## 2024-01-05 NOTE — TELEPHONE ENCOUNTER
"Patient needs appointment. Medication is \"reported\" on med list. Needs to follow up with provider to discuss.   "

## 2024-01-05 NOTE — TELEPHONE ENCOUNTER
"       vitamin D3 (CHOLECALCIFEROL) 125 MCG (5000 UT) tablet      Last Written Prescription Date:  Na  Last Fill Quantity: ~,   # refills: ~  Last Office Visit : 9/28/23  Future Office visit:  11/30/23 cancelled per appt tab\" Other (No longer continuing services from this provider. Answers were never fully given lack of response. Does not have the care for patients concerns. Not following up or explaining what was needing to be explained. ) \"    Routing refill request to provider for review/approval because:  Medication is reported/historical.   PMHx significant for duodenal switch procedure in 2016 c/b malnutrition requiring revision on 10/23/2023 c/b ongoing post-operative abdominal pain with multiple recent ED visits . Cancelled 11/30/23 post op Ira saldivart, looks like no longer seeking care here?      "

## 2024-01-10 RX ORDER — ZINC GLUCONATE 50 MG
50 TABLET ORAL DAILY
Qty: 90 TABLET | Refills: 3 | OUTPATIENT
Start: 2024-01-10

## 2024-01-10 NOTE — TELEPHONE ENCOUNTER
" zinc gluconate 50 MG tablet       Last Written Prescription Date:   Reported/  # refills: historical   Last Office Visit : 9/28/23  Future Office visit:  11/30/23 cancelled per appt tab\" Other (No longer continuing services from this provider. Answers were never fully given lack of response. Does not have the care for patients concerns. Not following up or explaining what was needing to be explained. ) \"        Patient needs appointment. Medication is \"reported\" on med list. Needs to follow up with provider to discuss.        "

## 2024-03-10 ENCOUNTER — HEALTH MAINTENANCE LETTER (OUTPATIENT)
Age: 44
End: 2024-03-10

## 2024-07-28 ENCOUNTER — HEALTH MAINTENANCE LETTER (OUTPATIENT)
Age: 44
End: 2024-07-28

## 2025-08-10 ENCOUNTER — HEALTH MAINTENANCE LETTER (OUTPATIENT)
Age: 45
End: 2025-08-10

## (undated) DEVICE — DRAPE POUCH INSTRUMENT 1018

## (undated) DEVICE — BNDG ABDOMINAL BINDER 15X46-62"  08140562

## (undated) DEVICE — SU PDS II 4-0 SH 27" Z315H

## (undated) DEVICE — SU DERMABOND ADVANCED .7ML DNX12

## (undated) DEVICE — SU PDS II 0 CT-1 27" Z340H

## (undated) DEVICE — ESU PENCIL SMOKE EVAC W/ROCKER SWITCH 0703-047-000

## (undated) DEVICE — STPL ENDO LINEAR CUT ECHELON GST 45MM COMPACT PCEE45A

## (undated) DEVICE — BLADE KNIFE SURG 15 371115

## (undated) DEVICE — SUCTION TIP POOLE K770

## (undated) DEVICE — LINEN TOWEL PACK X6 WHITE 5487

## (undated) DEVICE — CLIP HORIZON MED BLUE 002200

## (undated) DEVICE — SU MONOCRYL 4-0 PS-2 27" UND Y426H

## (undated) DEVICE — SUCTION MANIFOLD NEPTUNE 2 SYS 4 PORT 0702-020-000

## (undated) DEVICE — SOL NACL 0.9% IRRIG 1000ML BOTTLE 2F7124

## (undated) DEVICE — Device

## (undated) DEVICE — WIPE PREMOIST CLEANSING WASHCLOTHS 7988

## (undated) DEVICE — LINEN TOWEL PACK X30 5481

## (undated) DEVICE — GLOVE BIOGEL PI ULTRATOUCH SZ 7.5 41175

## (undated) DEVICE — ESU LIGASURE IMPACT OPEN SEALER/DVDR CVD LG JAW LF4418

## (undated) DEVICE — CLIP HORIZON SM RED WIDE SLOT 001201

## (undated) DEVICE — SOL WATER IRRIG 1000ML BOTTLE 2F7114

## (undated) DEVICE — PREP CHLORAPREP 26ML TINTED HI-LITE ORANGE 930815

## (undated) DEVICE — STPL ENDO LINEAR CUT ECHELON GST 60MM COMPACT PCEE60A

## (undated) RX ORDER — HYDROMORPHONE HCL IN WATER/PF 6 MG/30 ML
PATIENT CONTROLLED ANALGESIA SYRINGE INTRAVENOUS
Status: DISPENSED
Start: 2023-10-23

## (undated) RX ORDER — HYDROMORPHONE HYDROCHLORIDE 1 MG/ML
INJECTION, SOLUTION INTRAMUSCULAR; INTRAVENOUS; SUBCUTANEOUS
Status: DISPENSED
Start: 2023-10-23

## (undated) RX ORDER — ONDANSETRON 2 MG/ML
INJECTION INTRAMUSCULAR; INTRAVENOUS
Status: DISPENSED
Start: 2023-10-23

## (undated) RX ORDER — FENTANYL CITRATE 50 UG/ML
INJECTION, SOLUTION INTRAMUSCULAR; INTRAVENOUS
Status: DISPENSED
Start: 2023-10-23

## (undated) RX ORDER — SCOLOPAMINE TRANSDERMAL SYSTEM 1 MG/1
PATCH, EXTENDED RELEASE TRANSDERMAL
Status: DISPENSED
Start: 2023-10-23

## (undated) RX ORDER — FENTANYL CITRATE 50 UG/ML
INJECTION, SOLUTION INTRAMUSCULAR; INTRAVENOUS
Status: DISPENSED
Start: 2023-06-14

## (undated) RX ORDER — CEFAZOLIN SODIUM/WATER 2 G/20 ML
SYRINGE (ML) INTRAVENOUS
Status: DISPENSED
Start: 2023-10-23

## (undated) RX ORDER — ACETAMINOPHEN 325 MG/1
TABLET ORAL
Status: DISPENSED
Start: 2023-10-23

## (undated) RX ORDER — FENTANYL CITRATE-0.9 % NACL/PF 10 MCG/ML
PLASTIC BAG, INJECTION (ML) INTRAVENOUS
Status: DISPENSED
Start: 2023-10-23

## (undated) RX ORDER — ENOXAPARIN SODIUM 100 MG/ML
INJECTION SUBCUTANEOUS
Status: DISPENSED
Start: 2023-10-23

## (undated) RX ORDER — PROPOFOL 10 MG/ML
INJECTION, EMULSION INTRAVENOUS
Status: DISPENSED
Start: 2023-10-23

## (undated) RX ORDER — GLYCOPYRROLATE 0.2 MG/ML
INJECTION, SOLUTION INTRAMUSCULAR; INTRAVENOUS
Status: DISPENSED
Start: 2023-10-23